# Patient Record
Sex: FEMALE | Race: WHITE | Employment: OTHER | URBAN - METROPOLITAN AREA
[De-identification: names, ages, dates, MRNs, and addresses within clinical notes are randomized per-mention and may not be internally consistent; named-entity substitution may affect disease eponyms.]

---

## 2017-02-14 ENCOUNTER — OFFICE VISIT (OUTPATIENT)
Dept: FAMILY MEDICINE CLINIC | Age: 77
End: 2017-02-14

## 2017-02-14 VITALS
OXYGEN SATURATION: 97 % | BODY MASS INDEX: 38.17 KG/M2 | SYSTOLIC BLOOD PRESSURE: 120 MMHG | TEMPERATURE: 99.4 F | WEIGHT: 202 LBS | DIASTOLIC BLOOD PRESSURE: 80 MMHG | HEART RATE: 100 BPM

## 2017-02-14 DIAGNOSIS — J40 BRONCHITIS: ICD-10-CM

## 2017-02-14 DIAGNOSIS — J01.00 ACUTE NON-RECURRENT MAXILLARY SINUSITIS: Primary | ICD-10-CM

## 2017-02-14 PROCEDURE — 99213 OFFICE O/P EST LOW 20 MIN: CPT | Performed by: PHYSICIAN ASSISTANT

## 2017-02-14 PROCEDURE — 3014F SCREEN MAMMO DOC REV: CPT | Performed by: PHYSICIAN ASSISTANT

## 2017-02-14 PROCEDURE — 3017F COLORECTAL CA SCREEN DOC REV: CPT | Performed by: PHYSICIAN ASSISTANT

## 2017-02-14 PROCEDURE — G8399 PT W/DXA RESULTS DOCUMENT: HCPCS | Performed by: PHYSICIAN ASSISTANT

## 2017-02-14 PROCEDURE — 1036F TOBACCO NON-USER: CPT | Performed by: PHYSICIAN ASSISTANT

## 2017-02-14 PROCEDURE — 1123F ACP DISCUSS/DSCN MKR DOCD: CPT | Performed by: PHYSICIAN ASSISTANT

## 2017-02-14 PROCEDURE — 1090F PRES/ABSN URINE INCON ASSESS: CPT | Performed by: PHYSICIAN ASSISTANT

## 2017-02-14 PROCEDURE — G8484 FLU IMMUNIZE NO ADMIN: HCPCS | Performed by: PHYSICIAN ASSISTANT

## 2017-02-14 PROCEDURE — 4040F PNEUMOC VAC/ADMIN/RCVD: CPT | Performed by: PHYSICIAN ASSISTANT

## 2017-02-14 PROCEDURE — G8427 DOCREV CUR MEDS BY ELIG CLIN: HCPCS | Performed by: PHYSICIAN ASSISTANT

## 2017-02-14 PROCEDURE — G8419 CALC BMI OUT NRM PARAM NOF/U: HCPCS | Performed by: PHYSICIAN ASSISTANT

## 2017-02-14 RX ORDER — PREDNISONE 10 MG/1
TABLET ORAL
Qty: 30 TABLET | Refills: 0 | Status: SHIPPED | OUTPATIENT
Start: 2017-02-14 | End: 2017-03-22 | Stop reason: ALTCHOICE

## 2017-02-14 RX ORDER — GUAIFENESIN AND CODEINE PHOSPHATE 100; 10 MG/5ML; MG/5ML
5 SOLUTION ORAL EVERY 4 HOURS PRN
Qty: 120 ML | Refills: 0 | Status: SHIPPED | OUTPATIENT
Start: 2017-02-14 | End: 2017-02-21

## 2017-02-14 RX ORDER — AMOXICILLIN AND CLAVULANATE POTASSIUM 875; 125 MG/1; MG/1
1 TABLET, FILM COATED ORAL 2 TIMES DAILY
Qty: 20 TABLET | Refills: 0 | Status: SHIPPED | OUTPATIENT
Start: 2017-02-14 | End: 2017-02-24

## 2017-02-14 ASSESSMENT — ENCOUNTER SYMPTOMS
SORE THROAT: 1
NAUSEA: 0
VOMITING: 0
SHORTNESS OF BREATH: 0
RHINORRHEA: 0
SINUS PRESSURE: 1
WHEEZING: 0
COUGH: 1

## 2017-03-01 ENCOUNTER — CARE COORDINATION (OUTPATIENT)
Dept: CARE COORDINATION | Age: 77
End: 2017-03-01

## 2017-03-22 ENCOUNTER — OFFICE VISIT (OUTPATIENT)
Dept: FAMILY MEDICINE CLINIC | Age: 77
End: 2017-03-22

## 2017-03-22 VITALS
SYSTOLIC BLOOD PRESSURE: 108 MMHG | BODY MASS INDEX: 37.98 KG/M2 | OXYGEN SATURATION: 93 % | WEIGHT: 201 LBS | DIASTOLIC BLOOD PRESSURE: 70 MMHG | HEART RATE: 99 BPM

## 2017-03-22 DIAGNOSIS — M16.0 PRIMARY OSTEOARTHRITIS OF BOTH HIPS: ICD-10-CM

## 2017-03-22 DIAGNOSIS — I10 ESSENTIAL HYPERTENSION: Primary | ICD-10-CM

## 2017-03-22 DIAGNOSIS — E03.9 ACQUIRED HYPOTHYROIDISM: ICD-10-CM

## 2017-03-22 DIAGNOSIS — E78.00 PURE HYPERCHOLESTEROLEMIA: ICD-10-CM

## 2017-03-22 DIAGNOSIS — J44.9 CHRONIC AIRWAY OBSTRUCTION, NOT ELSEWHERE CLASSIFIED: ICD-10-CM

## 2017-03-22 PROCEDURE — 99214 OFFICE O/P EST MOD 30 MIN: CPT | Performed by: FAMILY MEDICINE

## 2017-03-22 PROCEDURE — 3017F COLORECTAL CA SCREEN DOC REV: CPT | Performed by: FAMILY MEDICINE

## 2017-03-22 PROCEDURE — 4040F PNEUMOC VAC/ADMIN/RCVD: CPT | Performed by: FAMILY MEDICINE

## 2017-03-22 PROCEDURE — G8417 CALC BMI ABV UP PARAM F/U: HCPCS | Performed by: FAMILY MEDICINE

## 2017-03-22 PROCEDURE — G8399 PT W/DXA RESULTS DOCUMENT: HCPCS | Performed by: FAMILY MEDICINE

## 2017-03-22 PROCEDURE — 3014F SCREEN MAMMO DOC REV: CPT | Performed by: FAMILY MEDICINE

## 2017-03-22 PROCEDURE — 1123F ACP DISCUSS/DSCN MKR DOCD: CPT | Performed by: FAMILY MEDICINE

## 2017-03-22 PROCEDURE — 3023F SPIROM DOC REV: CPT | Performed by: FAMILY MEDICINE

## 2017-03-22 PROCEDURE — 1090F PRES/ABSN URINE INCON ASSESS: CPT | Performed by: FAMILY MEDICINE

## 2017-03-22 PROCEDURE — G8484 FLU IMMUNIZE NO ADMIN: HCPCS | Performed by: FAMILY MEDICINE

## 2017-03-22 PROCEDURE — 1036F TOBACCO NON-USER: CPT | Performed by: FAMILY MEDICINE

## 2017-03-22 PROCEDURE — G8427 DOCREV CUR MEDS BY ELIG CLIN: HCPCS | Performed by: FAMILY MEDICINE

## 2017-03-22 PROCEDURE — G8926 SPIRO NO PERF OR DOC: HCPCS | Performed by: FAMILY MEDICINE

## 2017-03-22 RX ORDER — LOSARTAN POTASSIUM AND HYDROCHLOROTHIAZIDE 12.5; 5 MG/1; MG/1
1 TABLET ORAL DAILY
Qty: 90 TABLET | Refills: 3 | Status: SHIPPED | OUTPATIENT
Start: 2017-03-22 | End: 2018-04-30 | Stop reason: SDUPTHER

## 2017-03-22 ASSESSMENT — ENCOUNTER SYMPTOMS
ABDOMINAL PAIN: 0
BACK PAIN: 1
CONSTIPATION: 0
SHORTNESS OF BREATH: 0
DIARRHEA: 0
WHEEZING: 0
COUGH: 0

## 2017-05-02 ENCOUNTER — CARE COORDINATION (OUTPATIENT)
Dept: CARE COORDINATION | Age: 77
End: 2017-05-02

## 2017-05-24 ENCOUNTER — CARE COORDINATION (OUTPATIENT)
Dept: CARE COORDINATION | Age: 77
End: 2017-05-24

## 2017-06-23 ENCOUNTER — OFFICE VISIT (OUTPATIENT)
Dept: FAMILY MEDICINE CLINIC | Age: 77
End: 2017-06-23

## 2017-06-23 VITALS
HEART RATE: 96 BPM | BODY MASS INDEX: 37.98 KG/M2 | DIASTOLIC BLOOD PRESSURE: 80 MMHG | OXYGEN SATURATION: 92 % | SYSTOLIC BLOOD PRESSURE: 134 MMHG | WEIGHT: 201 LBS

## 2017-06-23 DIAGNOSIS — E78.00 PURE HYPERCHOLESTEROLEMIA: ICD-10-CM

## 2017-06-23 DIAGNOSIS — M15.9 GENERALIZED OSTEOARTHROSIS, INVOLVING MULTIPLE SITES: ICD-10-CM

## 2017-06-23 DIAGNOSIS — D50.0 IRON DEFICIENCY ANEMIA DUE TO CHRONIC BLOOD LOSS: ICD-10-CM

## 2017-06-23 DIAGNOSIS — M17.0 PRIMARY OSTEOARTHRITIS OF BOTH KNEES: Primary | ICD-10-CM

## 2017-06-23 DIAGNOSIS — I10 ESSENTIAL HYPERTENSION: ICD-10-CM

## 2017-06-23 DIAGNOSIS — R73.09 OTHER ABNORMAL GLUCOSE: ICD-10-CM

## 2017-06-23 PROCEDURE — 3017F COLORECTAL CA SCREEN DOC REV: CPT | Performed by: FAMILY MEDICINE

## 2017-06-23 PROCEDURE — 1123F ACP DISCUSS/DSCN MKR DOCD: CPT | Performed by: FAMILY MEDICINE

## 2017-06-23 PROCEDURE — 99214 OFFICE O/P EST MOD 30 MIN: CPT | Performed by: FAMILY MEDICINE

## 2017-06-23 PROCEDURE — 1036F TOBACCO NON-USER: CPT | Performed by: FAMILY MEDICINE

## 2017-06-23 PROCEDURE — G8399 PT W/DXA RESULTS DOCUMENT: HCPCS | Performed by: FAMILY MEDICINE

## 2017-06-23 PROCEDURE — G8417 CALC BMI ABV UP PARAM F/U: HCPCS | Performed by: FAMILY MEDICINE

## 2017-06-23 PROCEDURE — G8427 DOCREV CUR MEDS BY ELIG CLIN: HCPCS | Performed by: FAMILY MEDICINE

## 2017-06-23 PROCEDURE — 4040F PNEUMOC VAC/ADMIN/RCVD: CPT | Performed by: FAMILY MEDICINE

## 2017-06-23 PROCEDURE — 1090F PRES/ABSN URINE INCON ASSESS: CPT | Performed by: FAMILY MEDICINE

## 2017-06-23 RX ORDER — ATORVASTATIN CALCIUM 20 MG/1
20 TABLET, FILM COATED ORAL DAILY
Qty: 30 TABLET | Refills: 5 | Status: SHIPPED | OUTPATIENT
Start: 2017-06-23 | End: 2018-10-05 | Stop reason: SDUPTHER

## 2017-06-23 ASSESSMENT — ENCOUNTER SYMPTOMS
WHEEZING: 0
CONSTIPATION: 0
COUGH: 0
ABDOMINAL PAIN: 0
SHORTNESS OF BREATH: 1
BACK PAIN: 1
DIARRHEA: 0

## 2017-09-25 ENCOUNTER — OFFICE VISIT (OUTPATIENT)
Dept: FAMILY MEDICINE CLINIC | Age: 77
End: 2017-09-25

## 2017-09-25 VITALS
OXYGEN SATURATION: 92 % | DIASTOLIC BLOOD PRESSURE: 82 MMHG | HEART RATE: 94 BPM | SYSTOLIC BLOOD PRESSURE: 118 MMHG | WEIGHT: 203 LBS | HEIGHT: 62 IN | BODY MASS INDEX: 37.36 KG/M2

## 2017-09-25 DIAGNOSIS — E78.00 PURE HYPERCHOLESTEROLEMIA: ICD-10-CM

## 2017-09-25 DIAGNOSIS — Z12.11 COLON CANCER SCREENING: ICD-10-CM

## 2017-09-25 DIAGNOSIS — M16.0 PRIMARY OSTEOARTHRITIS OF BOTH HIPS: ICD-10-CM

## 2017-09-25 DIAGNOSIS — I10 ESSENTIAL HYPERTENSION: Primary | ICD-10-CM

## 2017-09-25 DIAGNOSIS — R73.09 OTHER ABNORMAL GLUCOSE: ICD-10-CM

## 2017-09-25 DIAGNOSIS — E03.9 ACQUIRED HYPOTHYROIDISM: ICD-10-CM

## 2017-09-25 PROCEDURE — G8417 CALC BMI ABV UP PARAM F/U: HCPCS | Performed by: FAMILY MEDICINE

## 2017-09-25 PROCEDURE — 4040F PNEUMOC VAC/ADMIN/RCVD: CPT | Performed by: FAMILY MEDICINE

## 2017-09-25 PROCEDURE — G8510 SCR DEP NEG, NO PLAN REQD: HCPCS | Performed by: FAMILY MEDICINE

## 2017-09-25 PROCEDURE — 3017F COLORECTAL CA SCREEN DOC REV: CPT | Performed by: FAMILY MEDICINE

## 2017-09-25 PROCEDURE — G8399 PT W/DXA RESULTS DOCUMENT: HCPCS | Performed by: FAMILY MEDICINE

## 2017-09-25 PROCEDURE — G0008 ADMIN INFLUENZA VIRUS VAC: HCPCS | Performed by: FAMILY MEDICINE

## 2017-09-25 PROCEDURE — 1036F TOBACCO NON-USER: CPT | Performed by: FAMILY MEDICINE

## 2017-09-25 PROCEDURE — 90662 IIV NO PRSV INCREASED AG IM: CPT | Performed by: FAMILY MEDICINE

## 2017-09-25 PROCEDURE — 1123F ACP DISCUSS/DSCN MKR DOCD: CPT | Performed by: FAMILY MEDICINE

## 2017-09-25 PROCEDURE — 99214 OFFICE O/P EST MOD 30 MIN: CPT | Performed by: FAMILY MEDICINE

## 2017-09-25 PROCEDURE — 1090F PRES/ABSN URINE INCON ASSESS: CPT | Performed by: FAMILY MEDICINE

## 2017-09-25 PROCEDURE — G8427 DOCREV CUR MEDS BY ELIG CLIN: HCPCS | Performed by: FAMILY MEDICINE

## 2017-09-25 RX ORDER — ALBUTEROL SULFATE 90 UG/1
2 AEROSOL, METERED RESPIRATORY (INHALATION) EVERY 6 HOURS PRN
Qty: 1 INHALER | Refills: 6 | Status: SHIPPED | OUTPATIENT
Start: 2017-09-25 | End: 2018-03-26 | Stop reason: SDUPTHER

## 2017-09-25 ASSESSMENT — PATIENT HEALTH QUESTIONNAIRE - PHQ9
SUM OF ALL RESPONSES TO PHQ9 QUESTIONS 1 & 2: 0
SUM OF ALL RESPONSES TO PHQ QUESTIONS 1-9: 0
2. FEELING DOWN, DEPRESSED OR HOPELESS: 0
1. LITTLE INTEREST OR PLEASURE IN DOING THINGS: 0

## 2017-10-17 ENCOUNTER — OFFICE VISIT (OUTPATIENT)
Dept: FAMILY MEDICINE CLINIC | Age: 77
End: 2017-10-17

## 2017-10-17 VITALS
DIASTOLIC BLOOD PRESSURE: 80 MMHG | BODY MASS INDEX: 37.61 KG/M2 | TEMPERATURE: 99.3 F | SYSTOLIC BLOOD PRESSURE: 120 MMHG | HEART RATE: 89 BPM | WEIGHT: 204 LBS | OXYGEN SATURATION: 97 %

## 2017-10-17 DIAGNOSIS — J18.9 PNEUMONIA OF BOTH LOWER LOBES DUE TO INFECTIOUS ORGANISM: Primary | ICD-10-CM

## 2017-10-17 DIAGNOSIS — J41.8 MIXED SIMPLE AND MUCOPURULENT CHRONIC BRONCHITIS (HCC): ICD-10-CM

## 2017-10-17 DIAGNOSIS — M25.512 LEFT SHOULDER PAIN, UNSPECIFIED CHRONICITY: ICD-10-CM

## 2017-10-17 PROCEDURE — 94640 AIRWAY INHALATION TREATMENT: CPT | Performed by: FAMILY MEDICINE

## 2017-10-17 PROCEDURE — 93000 ELECTROCARDIOGRAM COMPLETE: CPT | Performed by: FAMILY MEDICINE

## 2017-10-17 PROCEDURE — 1036F TOBACCO NON-USER: CPT | Performed by: FAMILY MEDICINE

## 2017-10-17 PROCEDURE — G8399 PT W/DXA RESULTS DOCUMENT: HCPCS | Performed by: FAMILY MEDICINE

## 2017-10-17 PROCEDURE — 1123F ACP DISCUSS/DSCN MKR DOCD: CPT | Performed by: FAMILY MEDICINE

## 2017-10-17 PROCEDURE — 99214 OFFICE O/P EST MOD 30 MIN: CPT | Performed by: FAMILY MEDICINE

## 2017-10-17 PROCEDURE — G8484 FLU IMMUNIZE NO ADMIN: HCPCS | Performed by: FAMILY MEDICINE

## 2017-10-17 PROCEDURE — G8417 CALC BMI ABV UP PARAM F/U: HCPCS | Performed by: FAMILY MEDICINE

## 2017-10-17 PROCEDURE — 3023F SPIROM DOC REV: CPT | Performed by: FAMILY MEDICINE

## 2017-10-17 PROCEDURE — 4040F PNEUMOC VAC/ADMIN/RCVD: CPT | Performed by: FAMILY MEDICINE

## 2017-10-17 PROCEDURE — G8926 SPIRO NO PERF OR DOC: HCPCS | Performed by: FAMILY MEDICINE

## 2017-10-17 PROCEDURE — G8427 DOCREV CUR MEDS BY ELIG CLIN: HCPCS | Performed by: FAMILY MEDICINE

## 2017-10-17 PROCEDURE — 1090F PRES/ABSN URINE INCON ASSESS: CPT | Performed by: FAMILY MEDICINE

## 2017-10-17 PROCEDURE — 3017F COLORECTAL CA SCREEN DOC REV: CPT | Performed by: FAMILY MEDICINE

## 2017-10-17 RX ORDER — IPRATROPIUM BROMIDE AND ALBUTEROL SULFATE 2.5; .5 MG/3ML; MG/3ML
1 SOLUTION RESPIRATORY (INHALATION) ONCE
Status: COMPLETED | OUTPATIENT
Start: 2017-10-17 | End: 2017-10-17

## 2017-10-17 RX ORDER — LEVOFLOXACIN 750 MG/1
750 TABLET ORAL DAILY
Qty: 7 TABLET | Refills: 0 | Status: SHIPPED | OUTPATIENT
Start: 2017-10-17 | End: 2017-10-24

## 2017-10-17 RX ADMIN — IPRATROPIUM BROMIDE AND ALBUTEROL SULFATE 1 AMPULE: 2.5; .5 SOLUTION RESPIRATORY (INHALATION) at 13:18

## 2017-10-17 ASSESSMENT — ENCOUNTER SYMPTOMS
SORE THROAT: 1
TROUBLE SWALLOWING: 1
COUGH: 1
WHEEZING: 1
CHEST TIGHTNESS: 0
SHORTNESS OF BREATH: 1

## 2017-10-17 NOTE — PROGRESS NOTES
Jagjit Muhammad  : 1940  Encounter date: 10/17/2017    This is a 78 y.o. female who presents with  Chief Complaint   Patient presents with    URI     sore throat, body aches, nasal congestion, mostly dry when coughing. No known fever. Started a few days ago with fatigue, but felt really bad this morning. History of present illness:    Throat hurting, ears ringing. Aching. Doesn't think fever. Hurts on sides and feels \"big\". No significant sinus pain/pressure. Is coughing, phlegm comes up. More tight feeling. Always has productive cough due to COPD. Using inhalers as directed. Albuterol about twice daily which is her norm; just hasn't thought of doing it more. Has felt very tired for a few days but this morning felt really terrible and couldn't swallow. Has had some sharp pains coming and going in left shoulder - feels like when she had pneumonia in the past. Not staying. Occurs randomly. Allergies   Allergen Reactions    Ace Inhibitors      cough    Aspirin      Recurrent GI blood Loss?     Sulfa Antibiotics      hallucinations     Outpatient Prescriptions Marked as Taking for the 10/17/17 encounter (Office Visit) with Manda Crow MD   Medication Sig Dispense Refill    levofloxacin (LEVAQUIN) 750 MG tablet Take 1 tablet by mouth daily for 7 days 7 tablet 0    fluticasone-salmeterol (ADVAIR) 250-50 MCG/DOSE AEPB Inhale 1 puff into the lungs every 12 hours 1 each 6    albuterol sulfate HFA (PROAIR HFA) 108 (90 Base) MCG/ACT inhaler Inhale 2 puffs into the lungs every 6 hours as needed for Wheezing or Shortness of Breath 1 Inhaler 6    tiotropium (SPIRIVA HANDIHALER) 18 MCG inhalation capsule Inhale 1 capsule into the lungs daily 30 capsule 6    atorvastatin (LIPITOR) 20 MG tablet Take 1 tablet by mouth daily 30 tablet 5    losartan-hydrochlorothiazide (HYZAAR) 50-12.5 MG per tablet Take 1 tablet by mouth daily 90 tablet 3    ibuprofen (ADVIL;MOTRIN) 800 MG tablet Take 800 mg by mouth every 8 hours as needed for Pain         Review of Systems   Constitutional: Positive for diaphoresis (She has had a couple episodes of severe sweating.) and fatigue. Negative for chills and fever. HENT: Positive for sore throat, tinnitus and trouble swallowing (Due to painful throat). Negative for ear discharge, ear pain and hearing loss. Respiratory: Positive for cough, shortness of breath (States it's difficultto tell if this is significantly worse because her breathing difficulty comes and goes.) and wheezing. Negative for chest tightness. Cardiovascular: Negative for chest pain, palpitations and leg swelling. Objective:    /80   Pulse 89   Temp 99.3 °F (37.4 °C)   Wt 204 lb (92.5 kg)   SpO2 97%   BMI 37.61 kg/m²   Weight: 204 lb (92.5 kg)     BP Readings from Last 3 Encounters:   10/17/17 120/80   09/25/17 118/82   06/23/17 134/80     Wt Readings from Last 3 Encounters:   10/17/17 204 lb (92.5 kg)   09/25/17 203 lb (92.1 kg)   06/23/17 201 lb (91.2 kg)       Physical Exam   Constitutional: She appears well-developed. No distress. HENT:   Right Ear: No drainage. Tympanic membrane is not injected and not erythematous. Left Ear: No drainage. Tympanic membrane is perforated. Tympanic membrane is not injected and not erythematous. Nose: Mucosal edema present. Mouth/Throat: Uvula is midline and mucous membranes are normal. Posterior oropharyngeal edema and posterior oropharyngeal erythema (significant cobblestoning) present. There is a small perforation left TM   Neck: Normal range of motion. Neck supple. Cardiovascular: Normal rate and regular rhythm. Murmur (2/6 systolic) heard. Pulmonary/Chest: No respiratory distress. She has no wheezes. She has rales (Bilateral basal, increased on left). Lymphadenopathy:     She has no cervical adenopathy. Assessment/Plan    1. Pneumonia of both lower lobes due to infectious organism  Will cover with Levaquin.  Let us know if any worsening or not improved with treatment. Did advise her to increase albuterol to 3 times a day over the next couple of days and add Mucinex. Keep up with fluids. 2. Left shoulder pain, unspecified chronicity  EKG is stable from baseline. I suspect this is referred pain from pneumonia as she has had this exact discomfort in the past. I did advise her to note symptoms in case of any worsening.  - EKG 12 Lead      Return if symptoms worsen or fail to improve.

## 2017-11-03 ENCOUNTER — TELEPHONE (OUTPATIENT)
Dept: OTHER | Facility: CLINIC | Age: 77
End: 2017-11-03

## 2017-11-29 ENCOUNTER — NURSE ONLY (OUTPATIENT)
Dept: FAMILY MEDICINE CLINIC | Age: 77
End: 2017-11-29

## 2017-11-29 DIAGNOSIS — Z12.11 COLON CANCER SCREENING: ICD-10-CM

## 2017-11-29 LAB
CONTROL: NORMAL
HEMOCCULT STL QL: NEGATIVE

## 2017-11-29 PROCEDURE — 82274 ASSAY TEST FOR BLOOD FECAL: CPT | Performed by: FAMILY MEDICINE

## 2017-12-22 ENCOUNTER — OFFICE VISIT (OUTPATIENT)
Dept: FAMILY MEDICINE CLINIC | Age: 77
End: 2017-12-22

## 2017-12-22 VITALS
SYSTOLIC BLOOD PRESSURE: 135 MMHG | WEIGHT: 203 LBS | OXYGEN SATURATION: 95 % | BODY MASS INDEX: 37.43 KG/M2 | HEART RATE: 81 BPM | DIASTOLIC BLOOD PRESSURE: 70 MMHG

## 2017-12-22 DIAGNOSIS — I10 ESSENTIAL HYPERTENSION: ICD-10-CM

## 2017-12-22 DIAGNOSIS — M17.0 PRIMARY OSTEOARTHRITIS OF BOTH KNEES: ICD-10-CM

## 2017-12-22 DIAGNOSIS — D50.0 IRON DEFICIENCY ANEMIA DUE TO CHRONIC BLOOD LOSS: ICD-10-CM

## 2017-12-22 DIAGNOSIS — M16.0 PRIMARY OSTEOARTHRITIS OF BOTH HIPS: ICD-10-CM

## 2017-12-22 DIAGNOSIS — E03.9 ACQUIRED HYPOTHYROIDISM: Primary | ICD-10-CM

## 2017-12-22 DIAGNOSIS — E78.00 PURE HYPERCHOLESTEROLEMIA: ICD-10-CM

## 2017-12-22 DIAGNOSIS — G60.9 IDIOPATHIC PERIPHERAL NEUROPATHY: ICD-10-CM

## 2017-12-22 PROCEDURE — 3017F COLORECTAL CA SCREEN DOC REV: CPT | Performed by: FAMILY MEDICINE

## 2017-12-22 PROCEDURE — G8417 CALC BMI ABV UP PARAM F/U: HCPCS | Performed by: FAMILY MEDICINE

## 2017-12-22 PROCEDURE — G8427 DOCREV CUR MEDS BY ELIG CLIN: HCPCS | Performed by: FAMILY MEDICINE

## 2017-12-22 PROCEDURE — 99214 OFFICE O/P EST MOD 30 MIN: CPT | Performed by: FAMILY MEDICINE

## 2017-12-22 PROCEDURE — 4040F PNEUMOC VAC/ADMIN/RCVD: CPT | Performed by: FAMILY MEDICINE

## 2017-12-22 PROCEDURE — 1036F TOBACCO NON-USER: CPT | Performed by: FAMILY MEDICINE

## 2017-12-22 PROCEDURE — 1090F PRES/ABSN URINE INCON ASSESS: CPT | Performed by: FAMILY MEDICINE

## 2017-12-22 PROCEDURE — 1123F ACP DISCUSS/DSCN MKR DOCD: CPT | Performed by: FAMILY MEDICINE

## 2017-12-22 PROCEDURE — G8399 PT W/DXA RESULTS DOCUMENT: HCPCS | Performed by: FAMILY MEDICINE

## 2017-12-22 PROCEDURE — G8484 FLU IMMUNIZE NO ADMIN: HCPCS | Performed by: FAMILY MEDICINE

## 2017-12-22 RX ORDER — LEVOTHYROXINE SODIUM 0.1 MG/1
TABLET ORAL
Qty: 90 TABLET | Refills: 3 | Status: SHIPPED | OUTPATIENT
Start: 2017-12-22 | End: 2019-01-30 | Stop reason: SDUPTHER

## 2017-12-22 RX ORDER — GABAPENTIN 100 MG/1
CAPSULE ORAL
Qty: 90 CAPSULE | Refills: 3 | Status: SHIPPED | OUTPATIENT
Start: 2017-12-22 | End: 2018-02-08 | Stop reason: ALTCHOICE

## 2017-12-22 RX ORDER — GABAPENTIN 100 MG/1
CAPSULE ORAL
Qty: 90 CAPSULE | Refills: 3 | Status: SHIPPED | OUTPATIENT
Start: 2017-12-22 | End: 2017-12-22 | Stop reason: CLARIF

## 2017-12-22 ASSESSMENT — ENCOUNTER SYMPTOMS
DIARRHEA: 0
COUGH: 0
WHEEZING: 0
ABDOMINAL PAIN: 0
SHORTNESS OF BREATH: 0
CONSTIPATION: 0
BACK PAIN: 1

## 2017-12-22 NOTE — PROGRESS NOTES
strength. Right knee: She exhibits decreased range of motion. Left knee: She exhibits decreased range of motion. Neurological: She is alert and oriented to person, place, and time. Decrease vibratory sensation feet right > left   Psychiatric: She has a normal mood and affect. ASSESSMENT:    1. Acquired hypothyroidism  TSH WITH REFLEX TO FT4   2. Essential hypertension     3. Pure hypercholesterolemia  LIPID PANEL    Comprehensive Metabolic Panel   4. Primary osteoarthritis of both knees     5. Primary osteoarthritis of both hips     6. Idiopathic peripheral neuropathy  RPR    LEAD, BLOOD    PROTEIN ELECTROPHORESIS, SERUM    IMMUNOFIXATION SERUM PROFILE    KAPPA/LAMBDA QUANT FREE LIGHT CHAINS SERUM   7. Iron deficiency anemia due to chronic blood loss  CBC           PLAN:    See orders.  W/u peripheral neuropathy   seems to be idiopathic   other condtions controlled, meds the same   rx with gabapentin: Benefits and side effects of medication discussed with patient  Lab ordered, rtc 1 mpnth

## 2018-02-08 PROBLEM — J10.1 INFLUENZA A: Status: ACTIVE | Noted: 2018-02-08

## 2018-02-08 PROBLEM — J20.9 ACUTE BRONCHITIS: Status: ACTIVE | Noted: 2018-02-08

## 2018-02-08 PROBLEM — J44.1 COPD EXACERBATION (HCC): Status: ACTIVE | Noted: 2018-02-08

## 2018-02-21 ENCOUNTER — OFFICE VISIT (OUTPATIENT)
Dept: FAMILY MEDICINE CLINIC | Age: 78
End: 2018-02-21

## 2018-02-21 VITALS
HEART RATE: 91 BPM | TEMPERATURE: 99.3 F | SYSTOLIC BLOOD PRESSURE: 112 MMHG | OXYGEN SATURATION: 96 % | WEIGHT: 199 LBS | BODY MASS INDEX: 37.6 KG/M2 | DIASTOLIC BLOOD PRESSURE: 70 MMHG

## 2018-02-21 DIAGNOSIS — E03.9 ACQUIRED HYPOTHYROIDISM: ICD-10-CM

## 2018-02-21 DIAGNOSIS — I10 ESSENTIAL HYPERTENSION: ICD-10-CM

## 2018-02-21 DIAGNOSIS — J41.8 MIXED SIMPLE AND MUCOPURULENT CHRONIC BRONCHITIS (HCC): Primary | ICD-10-CM

## 2018-02-21 DIAGNOSIS — M15.9 GENERALIZED OSTEOARTHROSIS, INVOLVING MULTIPLE SITES: ICD-10-CM

## 2018-02-21 PROCEDURE — 1123F ACP DISCUSS/DSCN MKR DOCD: CPT | Performed by: FAMILY MEDICINE

## 2018-02-21 PROCEDURE — G8399 PT W/DXA RESULTS DOCUMENT: HCPCS | Performed by: FAMILY MEDICINE

## 2018-02-21 PROCEDURE — G8427 DOCREV CUR MEDS BY ELIG CLIN: HCPCS | Performed by: FAMILY MEDICINE

## 2018-02-21 PROCEDURE — 3023F SPIROM DOC REV: CPT | Performed by: FAMILY MEDICINE

## 2018-02-21 PROCEDURE — G8926 SPIRO NO PERF OR DOC: HCPCS | Performed by: FAMILY MEDICINE

## 2018-02-21 PROCEDURE — 1036F TOBACCO NON-USER: CPT | Performed by: FAMILY MEDICINE

## 2018-02-21 PROCEDURE — 4040F PNEUMOC VAC/ADMIN/RCVD: CPT | Performed by: FAMILY MEDICINE

## 2018-02-21 PROCEDURE — 99214 OFFICE O/P EST MOD 30 MIN: CPT | Performed by: FAMILY MEDICINE

## 2018-02-21 PROCEDURE — 3017F COLORECTAL CA SCREEN DOC REV: CPT | Performed by: FAMILY MEDICINE

## 2018-02-21 PROCEDURE — 1111F DSCHRG MED/CURRENT MED MERGE: CPT | Performed by: FAMILY MEDICINE

## 2018-02-21 PROCEDURE — G8484 FLU IMMUNIZE NO ADMIN: HCPCS | Performed by: FAMILY MEDICINE

## 2018-02-21 PROCEDURE — 1090F PRES/ABSN URINE INCON ASSESS: CPT | Performed by: FAMILY MEDICINE

## 2018-02-21 PROCEDURE — G8417 CALC BMI ABV UP PARAM F/U: HCPCS | Performed by: FAMILY MEDICINE

## 2018-02-21 RX ORDER — ACETAMINOPHEN 500 MG
1000 TABLET ORAL DAILY PRN
COMMUNITY

## 2018-02-21 NOTE — PROGRESS NOTES
Shortness of Breath 1 Inhaler 6    tiotropium (SPIRIVA HANDIHALER) 18 MCG inhalation capsule Inhale 1 capsule into the lungs daily 30 capsule 6    atorvastatin (LIPITOR) 20 MG tablet Take 1 tablet by mouth daily 30 tablet 5    losartan-hydrochlorothiazide (HYZAAR) 50-12.5 MG per tablet Take 1 tablet by mouth daily 90 tablet 3       Allergies   Allergen Reactions    Ace Inhibitors      cough    Aspirin      Recurrent GI blood Loss?  Sulfa Antibiotics      hallucinations       Social History   Substance Use Topics    Smoking status: Former Smoker    Smokeless tobacco: Never Used    Alcohol use No       /70 (Site: Left Arm, Position: Sitting, Cuff Size: Large Adult)   Pulse 91   Temp 99.3 °F (37.4 °C) (Tympanic)   Wt 199 lb (90.3 kg)   SpO2 96%   BMI 37.60 kg/m²      Current Outpatient Prescriptions on File Prior to Visit   Medication Sig Dispense Refill    levothyroxine (SYNTHROID) 100 MCG tablet TAKE 1 TABLET BY MOUTH EVERY DAY 90 tablet 3    fluticasone-salmeterol (ADVAIR) 250-50 MCG/DOSE AEPB Inhale 1 puff into the lungs every 12 hours 1 each 6    albuterol sulfate HFA (PROAIR HFA) 108 (90 Base) MCG/ACT inhaler Inhale 2 puffs into the lungs every 6 hours as needed for Wheezing or Shortness of Breath 1 Inhaler 6    tiotropium (SPIRIVA HANDIHALER) 18 MCG inhalation capsule Inhale 1 capsule into the lungs daily 30 capsule 6    atorvastatin (LIPITOR) 20 MG tablet Take 1 tablet by mouth daily 30 tablet 5    losartan-hydrochlorothiazide (HYZAAR) 50-12.5 MG per tablet Take 1 tablet by mouth daily 90 tablet 3     No current facility-administered medications on file prior to visit. Review of Systems    Objective:   Physical Exam   Constitutional: She is oriented to person, place, and time. She appears well-developed and well-nourished.    Obese   HENT:   Right Ear: External ear normal.   Left Ear: External ear normal.   Cardiovascular: Normal rate, regular rhythm, normal heart sounds and intact distal pulses. Pulmonary/Chest: Effort normal. No respiratory distress. Musculoskeletal: She exhibits no edema. Neurological: She is alert and oriented to person, place, and time. Psychiatric: She has a normal mood and affect. Assessment:      1. Mixed simple and mucopurulent chronic bronchitis ([address]     2. Generalized osteoarthrosis, involving multiple sites     3. Acquired hypothyroidism     4. Essential hypertension             Plan:      I reviewed her x-rays and lab from the hospital. Patient is apparently receiving home care at this point. We will continue with the same medications. I did recommend that she add Tylenol arthritis 2 in the morning and 2 at night to hear the help with her generalized arthritic pains. Continue to use all inhalers in her oral meds. She is to follow-up here in one month. She will need a basic metabolic panel rechecked in the future.

## 2018-03-06 ENCOUNTER — OFFICE VISIT (OUTPATIENT)
Dept: FAMILY MEDICINE CLINIC | Age: 78
End: 2018-03-06

## 2018-03-06 VITALS
OXYGEN SATURATION: 96 % | HEART RATE: 117 BPM | DIASTOLIC BLOOD PRESSURE: 70 MMHG | WEIGHT: 198.2 LBS | SYSTOLIC BLOOD PRESSURE: 110 MMHG | BODY MASS INDEX: 37.45 KG/M2 | TEMPERATURE: 98 F

## 2018-03-06 DIAGNOSIS — J06.9 UPPER RESPIRATORY TRACT INFECTION, UNSPECIFIED TYPE: Primary | ICD-10-CM

## 2018-03-06 PROCEDURE — 99213 OFFICE O/P EST LOW 20 MIN: CPT | Performed by: PHYSICIAN ASSISTANT

## 2018-03-06 PROCEDURE — G8417 CALC BMI ABV UP PARAM F/U: HCPCS | Performed by: PHYSICIAN ASSISTANT

## 2018-03-06 PROCEDURE — 1123F ACP DISCUSS/DSCN MKR DOCD: CPT | Performed by: PHYSICIAN ASSISTANT

## 2018-03-06 PROCEDURE — 3017F COLORECTAL CA SCREEN DOC REV: CPT | Performed by: PHYSICIAN ASSISTANT

## 2018-03-06 PROCEDURE — 1036F TOBACCO NON-USER: CPT | Performed by: PHYSICIAN ASSISTANT

## 2018-03-06 PROCEDURE — G8399 PT W/DXA RESULTS DOCUMENT: HCPCS | Performed by: PHYSICIAN ASSISTANT

## 2018-03-06 PROCEDURE — G8427 DOCREV CUR MEDS BY ELIG CLIN: HCPCS | Performed by: PHYSICIAN ASSISTANT

## 2018-03-06 PROCEDURE — 4040F PNEUMOC VAC/ADMIN/RCVD: CPT | Performed by: PHYSICIAN ASSISTANT

## 2018-03-06 PROCEDURE — G8484 FLU IMMUNIZE NO ADMIN: HCPCS | Performed by: PHYSICIAN ASSISTANT

## 2018-03-06 PROCEDURE — 1090F PRES/ABSN URINE INCON ASSESS: CPT | Performed by: PHYSICIAN ASSISTANT

## 2018-03-06 PROCEDURE — 1111F DSCHRG MED/CURRENT MED MERGE: CPT | Performed by: PHYSICIAN ASSISTANT

## 2018-03-06 RX ORDER — CEPHALEXIN 500 MG/1
500 CAPSULE ORAL 3 TIMES DAILY
Qty: 30 CAPSULE | Refills: 0 | Status: SHIPPED | OUTPATIENT
Start: 2018-03-06 | End: 2018-03-26

## 2018-03-06 RX ORDER — BENZONATATE 200 MG/1
200 CAPSULE ORAL 3 TIMES DAILY PRN
Qty: 30 CAPSULE | Refills: 0 | Status: SHIPPED | OUTPATIENT
Start: 2018-03-06 | End: 2018-03-16

## 2018-03-06 ASSESSMENT — ENCOUNTER SYMPTOMS
SHORTNESS OF BREATH: 1
SORE THROAT: 1
COUGH: 1
RHINORRHEA: 1
NAUSEA: 0
WHEEZING: 0
TROUBLE SWALLOWING: 0
VOMITING: 0
DIARRHEA: 0

## 2018-03-06 NOTE — PROGRESS NOTES
regular rhythm and normal heart sounds. Pulmonary/Chest: Effort normal and breath sounds normal. No respiratory distress. She has no decreased breath sounds. Lymphadenopathy:     She has no cervical adenopathy. Neurological: She is alert and oriented to person, place, and time. Assessment:      Mable Muniz was seen today for cough. Diagnoses and all orders for this visit:    Upper respiratory tract infection, unspecified type  -     cephALEXin (KEFLEX) 500 MG capsule; Take 1 capsule by mouth 3 times daily  -     benzonatate (TESSALON) 200 MG capsule; Take 1 capsule by mouth 3 times daily as needed for Cough             Plan:      Mucinex during the day, call next Monday with status. Call sooner if anything worsens or if you get a fever.

## 2018-03-06 NOTE — PATIENT INSTRUCTIONS
Candi Zeng was seen today for cough. Diagnoses and all orders for this visit:    Upper respiratory tract infection, unspecified type  -     cephALEXin (KEFLEX) 500 MG capsule; Take 1 capsule by mouth 3 times daily  -     benzonatate (TESSALON) 200 MG capsule; Take 1 capsule by mouth 3 times daily as needed for Cough       Take Mucinex for cough during the day. Call me Monday with status.

## 2018-03-26 ENCOUNTER — OFFICE VISIT (OUTPATIENT)
Dept: FAMILY MEDICINE CLINIC | Age: 78
End: 2018-03-26

## 2018-03-26 VITALS
BODY MASS INDEX: 37.6 KG/M2 | WEIGHT: 199 LBS | OXYGEN SATURATION: 96 % | SYSTOLIC BLOOD PRESSURE: 138 MMHG | HEART RATE: 87 BPM | DIASTOLIC BLOOD PRESSURE: 86 MMHG

## 2018-03-26 DIAGNOSIS — I10 ESSENTIAL HYPERTENSION: ICD-10-CM

## 2018-03-26 DIAGNOSIS — J41.8 MIXED SIMPLE AND MUCOPURULENT CHRONIC BRONCHITIS (HCC): Primary | ICD-10-CM

## 2018-03-26 PROCEDURE — G8417 CALC BMI ABV UP PARAM F/U: HCPCS | Performed by: FAMILY MEDICINE

## 2018-03-26 PROCEDURE — G8482 FLU IMMUNIZE ORDER/ADMIN: HCPCS | Performed by: FAMILY MEDICINE

## 2018-03-26 PROCEDURE — 1090F PRES/ABSN URINE INCON ASSESS: CPT | Performed by: FAMILY MEDICINE

## 2018-03-26 PROCEDURE — 4040F PNEUMOC VAC/ADMIN/RCVD: CPT | Performed by: FAMILY MEDICINE

## 2018-03-26 PROCEDURE — 99213 OFFICE O/P EST LOW 20 MIN: CPT | Performed by: FAMILY MEDICINE

## 2018-03-26 PROCEDURE — G8926 SPIRO NO PERF OR DOC: HCPCS | Performed by: FAMILY MEDICINE

## 2018-03-26 PROCEDURE — G8427 DOCREV CUR MEDS BY ELIG CLIN: HCPCS | Performed by: FAMILY MEDICINE

## 2018-03-26 PROCEDURE — 1036F TOBACCO NON-USER: CPT | Performed by: FAMILY MEDICINE

## 2018-03-26 PROCEDURE — G8399 PT W/DXA RESULTS DOCUMENT: HCPCS | Performed by: FAMILY MEDICINE

## 2018-03-26 PROCEDURE — 1123F ACP DISCUSS/DSCN MKR DOCD: CPT | Performed by: FAMILY MEDICINE

## 2018-03-26 PROCEDURE — 3017F COLORECTAL CA SCREEN DOC REV: CPT | Performed by: FAMILY MEDICINE

## 2018-03-26 PROCEDURE — 3023F SPIROM DOC REV: CPT | Performed by: FAMILY MEDICINE

## 2018-03-26 RX ORDER — ALBUTEROL SULFATE 90 UG/1
2 AEROSOL, METERED RESPIRATORY (INHALATION) EVERY 6 HOURS PRN
Qty: 1 INHALER | Refills: 6 | Status: SHIPPED | OUTPATIENT
Start: 2018-03-26 | End: 2019-04-02 | Stop reason: SDUPTHER

## 2018-03-26 NOTE — PROGRESS NOTES
(PROAIR HFA) 108 (90 Base) MCG/ACT inhaler Inhale 2 puffs into the lungs every 6 hours as needed for Wheezing or Shortness of Breath 1 Inhaler 6    tiotropium (SPIRIVA HANDIHALER) 18 MCG inhalation capsule Inhale 1 capsule into the lungs daily 30 capsule 6    atorvastatin (LIPITOR) 20 MG tablet Take 1 tablet by mouth daily 30 tablet 5    losartan-hydrochlorothiazide (HYZAAR) 50-12.5 MG per tablet Take 1 tablet by mouth daily 90 tablet 3     No current facility-administered medications on file prior to visit. ROS         Physical Exam   Constitutional: She is oriented to person, place, and time. She appears well-developed and well-nourished. Obese   HENT:   Right Ear: External ear normal.   Left Ear: External ear normal.   Cardiovascular: Normal rate, regular rhythm, normal heart sounds and intact distal pulses. Pulmonary/Chest: Effort normal. No respiratory distress. Musculoskeletal: She exhibits no edema. Neurological: She is alert and oriented to person, place, and time. Psychiatric: She has a normal mood and affect. Jo Ann Ruby was seen today for hypertension, hyperlipidemia, hypothyroidism and asthma. Diagnoses and all orders for this visit:    Mixed simple and mucopurulent chronic bronchitis ([address]    Essential hypertension    Other orders  -     fluticasone-salmeterol (ADVAIR) 250-50 MCG/DOSE AEPB; Inhale 1 puff into the lungs every 12 hours  -     albuterol sulfate HFA (PROAIR HFA) 108 (90 Base) MCG/ACT inhaler;  Inhale 2 puffs into the lungs every 6 hours as needed for Wheezing or Shortness of Breath    conditions improved, same medications, rtc 3 months; discussed change of living conditions, she is nervous about this

## 2018-04-30 RX ORDER — LOSARTAN POTASSIUM AND HYDROCHLOROTHIAZIDE 12.5; 5 MG/1; MG/1
1 TABLET ORAL DAILY
Qty: 90 TABLET | Refills: 0 | Status: SHIPPED | OUTPATIENT
Start: 2018-04-30 | End: 2018-07-31 | Stop reason: SDUPTHER

## 2018-06-19 ENCOUNTER — OFFICE VISIT (OUTPATIENT)
Dept: FAMILY MEDICINE CLINIC | Age: 78
End: 2018-06-19

## 2018-06-19 VITALS
BODY MASS INDEX: 37.98 KG/M2 | OXYGEN SATURATION: 96 % | DIASTOLIC BLOOD PRESSURE: 82 MMHG | SYSTOLIC BLOOD PRESSURE: 126 MMHG | TEMPERATURE: 99.4 F | WEIGHT: 201 LBS | HEART RATE: 98 BPM

## 2018-06-19 DIAGNOSIS — J18.9 PNEUMONIA OF LEFT LOWER LOBE DUE TO INFECTIOUS ORGANISM: ICD-10-CM

## 2018-06-19 DIAGNOSIS — J44.1 COPD EXACERBATION (HCC): Primary | ICD-10-CM

## 2018-06-19 PROCEDURE — 99214 OFFICE O/P EST MOD 30 MIN: CPT | Performed by: FAMILY MEDICINE

## 2018-06-19 PROCEDURE — 1036F TOBACCO NON-USER: CPT | Performed by: FAMILY MEDICINE

## 2018-06-19 PROCEDURE — G8926 SPIRO NO PERF OR DOC: HCPCS | Performed by: FAMILY MEDICINE

## 2018-06-19 PROCEDURE — 4040F PNEUMOC VAC/ADMIN/RCVD: CPT | Performed by: FAMILY MEDICINE

## 2018-06-19 PROCEDURE — G8417 CALC BMI ABV UP PARAM F/U: HCPCS | Performed by: FAMILY MEDICINE

## 2018-06-19 PROCEDURE — G8399 PT W/DXA RESULTS DOCUMENT: HCPCS | Performed by: FAMILY MEDICINE

## 2018-06-19 PROCEDURE — 1090F PRES/ABSN URINE INCON ASSESS: CPT | Performed by: FAMILY MEDICINE

## 2018-06-19 PROCEDURE — G8427 DOCREV CUR MEDS BY ELIG CLIN: HCPCS | Performed by: FAMILY MEDICINE

## 2018-06-19 PROCEDURE — 3023F SPIROM DOC REV: CPT | Performed by: FAMILY MEDICINE

## 2018-06-19 PROCEDURE — 1123F ACP DISCUSS/DSCN MKR DOCD: CPT | Performed by: FAMILY MEDICINE

## 2018-06-19 PROCEDURE — 94640 AIRWAY INHALATION TREATMENT: CPT | Performed by: FAMILY MEDICINE

## 2018-06-19 RX ORDER — PREDNISONE 20 MG/1
TABLET ORAL
COMMUNITY
Start: 2018-06-17 | End: 2018-06-27

## 2018-06-19 RX ORDER — IPRATROPIUM BROMIDE AND ALBUTEROL SULFATE 2.5; .5 MG/3ML; MG/3ML
1 SOLUTION RESPIRATORY (INHALATION) ONCE
Status: COMPLETED | OUTPATIENT
Start: 2018-06-19 | End: 2018-06-19

## 2018-06-19 RX ORDER — ALBUTEROL SULFATE 2.5 MG/3ML
2.5 SOLUTION RESPIRATORY (INHALATION) EVERY 6 HOURS PRN
Qty: 120 EACH | Refills: 1 | Status: SHIPPED | OUTPATIENT
Start: 2018-06-19 | End: 2019-09-26

## 2018-06-19 RX ORDER — FLUTICASONE PROPIONATE 50 MCG
2 SPRAY, SUSPENSION (ML) NASAL DAILY PRN
COMMUNITY
Start: 2018-06-17 | End: 2019-05-03 | Stop reason: SDUPTHER

## 2018-06-19 RX ORDER — LEVOFLOXACIN 750 MG/1
750 TABLET ORAL DAILY
Qty: 7 TABLET | Refills: 0 | Status: SHIPPED | OUTPATIENT
Start: 2018-06-19 | End: 2018-06-26

## 2018-06-19 RX ORDER — CETIRIZINE HYDROCHLORIDE 10 MG/1
10 TABLET ORAL DAILY
COMMUNITY

## 2018-06-19 RX ADMIN — IPRATROPIUM BROMIDE AND ALBUTEROL SULFATE 1 AMPULE: 2.5; .5 SOLUTION RESPIRATORY (INHALATION) at 13:11

## 2018-06-19 ASSESSMENT — ENCOUNTER SYMPTOMS
WHEEZING: 1
COUGH: 1
SORE THROAT: 1
SHORTNESS OF BREATH: 1

## 2018-06-20 ENCOUNTER — TELEPHONE (OUTPATIENT)
Dept: RHEUMATOLOGY | Age: 78
End: 2018-06-20

## 2018-06-21 ENCOUNTER — TELEPHONE (OUTPATIENT)
Dept: FAMILY MEDICINE CLINIC | Age: 78
End: 2018-06-21

## 2018-06-27 ENCOUNTER — OFFICE VISIT (OUTPATIENT)
Dept: FAMILY MEDICINE CLINIC | Age: 78
End: 2018-06-27

## 2018-06-27 VITALS
BODY MASS INDEX: 38.32 KG/M2 | OXYGEN SATURATION: 95 % | WEIGHT: 202.8 LBS | HEART RATE: 95 BPM | DIASTOLIC BLOOD PRESSURE: 64 MMHG | SYSTOLIC BLOOD PRESSURE: 110 MMHG

## 2018-06-27 DIAGNOSIS — E78.00 PURE HYPERCHOLESTEROLEMIA: ICD-10-CM

## 2018-06-27 DIAGNOSIS — G60.9 IDIOPATHIC PERIPHERAL NEUROPATHY: ICD-10-CM

## 2018-06-27 DIAGNOSIS — I10 ESSENTIAL HYPERTENSION: ICD-10-CM

## 2018-06-27 DIAGNOSIS — J41.8 MIXED SIMPLE AND MUCOPURULENT CHRONIC BRONCHITIS (HCC): Primary | ICD-10-CM

## 2018-06-27 DIAGNOSIS — M16.0 PRIMARY OSTEOARTHRITIS OF BOTH HIPS: ICD-10-CM

## 2018-06-27 DIAGNOSIS — E03.9 ACQUIRED HYPOTHYROIDISM: ICD-10-CM

## 2018-06-27 PROCEDURE — G8399 PT W/DXA RESULTS DOCUMENT: HCPCS | Performed by: FAMILY MEDICINE

## 2018-06-27 PROCEDURE — G8427 DOCREV CUR MEDS BY ELIG CLIN: HCPCS | Performed by: FAMILY MEDICINE

## 2018-06-27 PROCEDURE — 1123F ACP DISCUSS/DSCN MKR DOCD: CPT | Performed by: FAMILY MEDICINE

## 2018-06-27 PROCEDURE — G8926 SPIRO NO PERF OR DOC: HCPCS | Performed by: FAMILY MEDICINE

## 2018-06-27 PROCEDURE — 4040F PNEUMOC VAC/ADMIN/RCVD: CPT | Performed by: FAMILY MEDICINE

## 2018-06-27 PROCEDURE — 1090F PRES/ABSN URINE INCON ASSESS: CPT | Performed by: FAMILY MEDICINE

## 2018-06-27 PROCEDURE — 3023F SPIROM DOC REV: CPT | Performed by: FAMILY MEDICINE

## 2018-06-27 PROCEDURE — G8417 CALC BMI ABV UP PARAM F/U: HCPCS | Performed by: FAMILY MEDICINE

## 2018-06-27 PROCEDURE — 1036F TOBACCO NON-USER: CPT | Performed by: FAMILY MEDICINE

## 2018-06-27 PROCEDURE — 99214 OFFICE O/P EST MOD 30 MIN: CPT | Performed by: FAMILY MEDICINE

## 2018-06-27 ASSESSMENT — ENCOUNTER SYMPTOMS
BLURRED VISION: 0
COUGH: 1
CONSTIPATION: 0
SHORTNESS OF BREATH: 0
BACK PAIN: 1
SPUTUM PRODUCTION: 1
HEARTBURN: 0
ABDOMINAL PAIN: 0

## 2018-07-31 RX ORDER — LOSARTAN POTASSIUM AND HYDROCHLOROTHIAZIDE 12.5; 5 MG/1; MG/1
1 TABLET ORAL DAILY
Qty: 90 TABLET | Refills: 0 | Status: SHIPPED | OUTPATIENT
Start: 2018-07-31 | End: 2018-10-05 | Stop reason: SDUPTHER

## 2018-09-27 ENCOUNTER — HOSPITAL ENCOUNTER (OUTPATIENT)
Age: 78
Discharge: HOME | End: 2018-09-27
Payer: MEDICARE

## 2018-09-27 DIAGNOSIS — G60.9 IDIOPATHIC PERIPHERAL NEUROPATHY: ICD-10-CM

## 2018-09-27 DIAGNOSIS — E03.9 ACQUIRED HYPOTHYROIDISM: ICD-10-CM

## 2018-09-27 DIAGNOSIS — E78.00 PURE HYPERCHOLESTEROLEMIA: ICD-10-CM

## 2018-09-27 DIAGNOSIS — D50.0 IRON DEFICIENCY ANEMIA DUE TO CHRONIC BLOOD LOSS: ICD-10-CM

## 2018-09-27 LAB
A/G RATIO: 1.2 (ref 1.1–2.2)
ALBUMIN SERPL-MCNC: 4.2 G/DL (ref 3.4–5)
ALP BLD-CCNC: 87 U/L (ref 40–129)
ALT SERPL-CCNC: 8 U/L (ref 10–40)
ANION GAP SERPL CALCULATED.3IONS-SCNC: 12 MMOL/L (ref 3–16)
AST SERPL-CCNC: 22 U/L (ref 15–37)
BILIRUB SERPL-MCNC: 1.2 MG/DL (ref 0–1)
BUN BLDV-MCNC: 15 MG/DL (ref 7–20)
CALCIUM SERPL-MCNC: 9.5 MG/DL (ref 8.3–10.6)
CHLORIDE BLD-SCNC: 99 MMOL/L (ref 99–110)
CHOLESTEROL, TOTAL: 186 MG/DL (ref 0–199)
CO2: 29 MMOL/L (ref 21–32)
CREAT SERPL-MCNC: 0.6 MG/DL (ref 0.6–1.2)
GFR AFRICAN AMERICAN: >60
GFR NON-AFRICAN AMERICAN: >60
GLOBULIN: 3.6 G/DL
GLUCOSE BLD-MCNC: 102 MG/DL (ref 70–99)
HCT VFR BLD CALC: 46.2 % (ref 36–48)
HDLC SERPL-MCNC: 49 MG/DL (ref 40–60)
HEMOGLOBIN: 15 G/DL (ref 12–16)
LDL CHOLESTEROL CALCULATED: 122 MG/DL
MCH RBC QN AUTO: 28.2 PG (ref 26–34)
MCHC RBC AUTO-ENTMCNC: 32.5 G/DL (ref 31–36)
MCV RBC AUTO: 86.9 FL (ref 80–100)
PDW BLD-RTO: 14.4 % (ref 12.4–15.4)
PLATELET # BLD: 298 K/UL (ref 135–450)
PMV BLD AUTO: 9 FL (ref 5–10.5)
POTASSIUM SERPL-SCNC: 4.2 MMOL/L (ref 3.5–5.1)
RBC # BLD: 5.31 M/UL (ref 4–5.2)
SODIUM BLD-SCNC: 140 MMOL/L (ref 136–145)
TOTAL PROTEIN: 7.8 G/DL (ref 6.4–8.2)
TOTAL SYPHILLIS IGG/IGM: NORMAL
TRIGL SERPL-MCNC: 74 MG/DL (ref 0–150)
TSH REFLEX FT4: 2.34 UIU/ML (ref 0.27–4.2)
VLDLC SERPL CALC-MCNC: 15 MG/DL
WBC # BLD: 6.5 K/UL (ref 4–11)

## 2018-09-27 PROCEDURE — 83883 ASSAY NEPHELOMETRY NOT SPEC: CPT

## 2018-09-27 PROCEDURE — 84165 PROTEIN E-PHORESIS SERUM: CPT

## 2018-09-27 PROCEDURE — 84155 ASSAY OF PROTEIN SERUM: CPT

## 2018-09-27 PROCEDURE — 80053 COMPREHEN METABOLIC PANEL: CPT

## 2018-09-27 PROCEDURE — 85027 COMPLETE CBC AUTOMATED: CPT

## 2018-09-27 PROCEDURE — 83655 ASSAY OF LEAD: CPT

## 2018-09-27 PROCEDURE — 84443 ASSAY THYROID STIM HORMONE: CPT

## 2018-09-27 PROCEDURE — 80061 LIPID PANEL: CPT

## 2018-09-27 PROCEDURE — 86780 TREPONEMA PALLIDUM: CPT

## 2018-09-28 LAB
ALBUMIN SERPL-MCNC: 3.8 G/DL (ref 3.1–4.9)
ALPHA-1-GLOBULIN: 0.2 G/DL (ref 0.2–0.4)
ALPHA-2-GLOBULIN: 0.9 G/DL (ref 0.4–1.1)
BETA GLOBULIN: 1.5 G/DL (ref 0.9–1.6)
GAMMA GLOBULIN: 1.4 G/DL (ref 0.6–1.8)
KAPPA, FREE LIGHT CHAINS, SERUM: 23.88 MG/L (ref 3.3–19.4)
KAPPA/LAMBDA RATIO: 1.06 (ref 0.26–1.65)
KAPPA/LAMBDA TEST COMMENT: ABNORMAL
LAMBDA, FREE LIGHT CHAINS, SERUM: 22.63 MG/L (ref 5.71–26.3)
SPE/IFE INTERPRETATION: NORMAL

## 2018-09-30 LAB — LEAD LEVEL BLOOD: <2 UG/DL (ref 0–4.9)

## 2018-10-05 ENCOUNTER — OFFICE VISIT (OUTPATIENT)
Dept: FAMILY MEDICINE CLINIC | Age: 78
End: 2018-10-05
Payer: MEDICARE

## 2018-10-05 VITALS
DIASTOLIC BLOOD PRESSURE: 60 MMHG | WEIGHT: 197.4 LBS | OXYGEN SATURATION: 94 % | SYSTOLIC BLOOD PRESSURE: 110 MMHG | HEART RATE: 122 BPM | BODY MASS INDEX: 37.3 KG/M2

## 2018-10-05 DIAGNOSIS — J41.8 MIXED SIMPLE AND MUCOPURULENT CHRONIC BRONCHITIS (HCC): ICD-10-CM

## 2018-10-05 DIAGNOSIS — G60.9 IDIOPATHIC PERIPHERAL NEUROPATHY: Primary | ICD-10-CM

## 2018-10-05 DIAGNOSIS — E78.00 PURE HYPERCHOLESTEROLEMIA: ICD-10-CM

## 2018-10-05 DIAGNOSIS — Z23 NEED FOR VACCINATION: ICD-10-CM

## 2018-10-05 DIAGNOSIS — I44.0 FIRST DEGREE AV BLOCK: ICD-10-CM

## 2018-10-05 DIAGNOSIS — I49.9 IRREGULAR HEART BEAT: ICD-10-CM

## 2018-10-05 DIAGNOSIS — I10 ESSENTIAL HYPERTENSION: ICD-10-CM

## 2018-10-05 DIAGNOSIS — I47.1 MULTIFOCAL ATRIAL TACHYCARDIA (HCC): ICD-10-CM

## 2018-10-05 DIAGNOSIS — M17.0 PRIMARY OSTEOARTHRITIS OF BOTH KNEES: ICD-10-CM

## 2018-10-05 DIAGNOSIS — E03.9 ACQUIRED HYPOTHYROIDISM: ICD-10-CM

## 2018-10-05 PROBLEM — I47.19 MULTIFOCAL ATRIAL TACHYCARDIA: Status: ACTIVE | Noted: 2018-10-05

## 2018-10-05 PROBLEM — J44.1 COPD EXACERBATION (HCC): Status: RESOLVED | Noted: 2018-02-08 | Resolved: 2018-10-05

## 2018-10-05 PROCEDURE — G8482 FLU IMMUNIZE ORDER/ADMIN: HCPCS | Performed by: FAMILY MEDICINE

## 2018-10-05 PROCEDURE — 99214 OFFICE O/P EST MOD 30 MIN: CPT | Performed by: FAMILY MEDICINE

## 2018-10-05 PROCEDURE — 4040F PNEUMOC VAC/ADMIN/RCVD: CPT | Performed by: FAMILY MEDICINE

## 2018-10-05 PROCEDURE — 1101F PT FALLS ASSESS-DOCD LE1/YR: CPT | Performed by: FAMILY MEDICINE

## 2018-10-05 PROCEDURE — 1123F ACP DISCUSS/DSCN MKR DOCD: CPT | Performed by: FAMILY MEDICINE

## 2018-10-05 PROCEDURE — G8427 DOCREV CUR MEDS BY ELIG CLIN: HCPCS | Performed by: FAMILY MEDICINE

## 2018-10-05 PROCEDURE — G0008 ADMIN INFLUENZA VIRUS VAC: HCPCS | Performed by: FAMILY MEDICINE

## 2018-10-05 PROCEDURE — 3023F SPIROM DOC REV: CPT | Performed by: FAMILY MEDICINE

## 2018-10-05 PROCEDURE — G8926 SPIRO NO PERF OR DOC: HCPCS | Performed by: FAMILY MEDICINE

## 2018-10-05 PROCEDURE — 93040 RHYTHM ECG WITH REPORT: CPT | Performed by: FAMILY MEDICINE

## 2018-10-05 PROCEDURE — 90662 IIV NO PRSV INCREASED AG IM: CPT | Performed by: FAMILY MEDICINE

## 2018-10-05 PROCEDURE — 1036F TOBACCO NON-USER: CPT | Performed by: FAMILY MEDICINE

## 2018-10-05 PROCEDURE — 93000 ELECTROCARDIOGRAM COMPLETE: CPT | Performed by: FAMILY MEDICINE

## 2018-10-05 PROCEDURE — G8417 CALC BMI ABV UP PARAM F/U: HCPCS | Performed by: FAMILY MEDICINE

## 2018-10-05 PROCEDURE — G8399 PT W/DXA RESULTS DOCUMENT: HCPCS | Performed by: FAMILY MEDICINE

## 2018-10-05 PROCEDURE — 1090F PRES/ABSN URINE INCON ASSESS: CPT | Performed by: FAMILY MEDICINE

## 2018-10-05 RX ORDER — ATORVASTATIN CALCIUM 20 MG/1
20 TABLET, FILM COATED ORAL DAILY
Qty: 90 TABLET | Refills: 1 | Status: SHIPPED | OUTPATIENT
Start: 2018-10-05 | End: 2019-11-04

## 2018-10-05 RX ORDER — LOSARTAN POTASSIUM AND HYDROCHLOROTHIAZIDE 12.5; 5 MG/1; MG/1
1 TABLET ORAL DAILY
Qty: 90 TABLET | Refills: 3 | Status: SHIPPED | OUTPATIENT
Start: 2018-10-05 | End: 2019-11-04 | Stop reason: SDUPTHER

## 2018-10-05 ASSESSMENT — ENCOUNTER SYMPTOMS
CONSTIPATION: 0
BACK PAIN: 1
SHORTNESS OF BREATH: 0
WHEEZING: 0
ABDOMINAL PAIN: 0
DIARRHEA: 0
COUGH: 0

## 2018-10-05 ASSESSMENT — PATIENT HEALTH QUESTIONNAIRE - PHQ9
1. LITTLE INTEREST OR PLEASURE IN DOING THINGS: 0
SUM OF ALL RESPONSES TO PHQ QUESTIONS 1-9: 0
2. FEELING DOWN, DEPRESSED OR HOPELESS: 0
SUM OF ALL RESPONSES TO PHQ9 QUESTIONS 1 & 2: 0
SUM OF ALL RESPONSES TO PHQ QUESTIONS 1-9: 0

## 2018-12-26 ENCOUNTER — TELEPHONE (OUTPATIENT)
Dept: FAMILY MEDICINE CLINIC | Age: 78
End: 2018-12-26

## 2018-12-26 ENCOUNTER — OFFICE VISIT (OUTPATIENT)
Dept: FAMILY MEDICINE CLINIC | Age: 78
End: 2018-12-26
Payer: MEDICARE

## 2018-12-26 VITALS
WEIGHT: 200 LBS | SYSTOLIC BLOOD PRESSURE: 108 MMHG | TEMPERATURE: 99.7 F | BODY MASS INDEX: 37.79 KG/M2 | DIASTOLIC BLOOD PRESSURE: 64 MMHG | OXYGEN SATURATION: 97 % | HEART RATE: 85 BPM

## 2018-12-26 DIAGNOSIS — J06.9 VIRAL URI: Primary | ICD-10-CM

## 2018-12-26 PROCEDURE — G8482 FLU IMMUNIZE ORDER/ADMIN: HCPCS | Performed by: FAMILY MEDICINE

## 2018-12-26 PROCEDURE — 99212 OFFICE O/P EST SF 10 MIN: CPT | Performed by: FAMILY MEDICINE

## 2018-12-26 PROCEDURE — G8417 CALC BMI ABV UP PARAM F/U: HCPCS | Performed by: FAMILY MEDICINE

## 2018-12-26 PROCEDURE — 1090F PRES/ABSN URINE INCON ASSESS: CPT | Performed by: FAMILY MEDICINE

## 2018-12-26 PROCEDURE — 1101F PT FALLS ASSESS-DOCD LE1/YR: CPT | Performed by: FAMILY MEDICINE

## 2018-12-26 PROCEDURE — G8427 DOCREV CUR MEDS BY ELIG CLIN: HCPCS | Performed by: FAMILY MEDICINE

## 2018-12-26 PROCEDURE — 1036F TOBACCO NON-USER: CPT | Performed by: FAMILY MEDICINE

## 2018-12-26 PROCEDURE — 4040F PNEUMOC VAC/ADMIN/RCVD: CPT | Performed by: FAMILY MEDICINE

## 2018-12-26 PROCEDURE — G8399 PT W/DXA RESULTS DOCUMENT: HCPCS | Performed by: FAMILY MEDICINE

## 2018-12-26 PROCEDURE — 1123F ACP DISCUSS/DSCN MKR DOCD: CPT | Performed by: FAMILY MEDICINE

## 2019-01-04 ENCOUNTER — OFFICE VISIT (OUTPATIENT)
Dept: FAMILY MEDICINE CLINIC | Age: 79
End: 2019-01-04
Payer: MEDICARE

## 2019-01-04 VITALS
DIASTOLIC BLOOD PRESSURE: 80 MMHG | SYSTOLIC BLOOD PRESSURE: 114 MMHG | HEART RATE: 81 BPM | WEIGHT: 200 LBS | OXYGEN SATURATION: 96 % | BODY MASS INDEX: 37.79 KG/M2

## 2019-01-04 DIAGNOSIS — J41.8 MIXED SIMPLE AND MUCOPURULENT CHRONIC BRONCHITIS (HCC): ICD-10-CM

## 2019-01-04 DIAGNOSIS — E03.9 ACQUIRED HYPOTHYROIDISM: ICD-10-CM

## 2019-01-04 DIAGNOSIS — I10 ESSENTIAL HYPERTENSION: Primary | ICD-10-CM

## 2019-01-04 DIAGNOSIS — E78.00 PURE HYPERCHOLESTEROLEMIA: ICD-10-CM

## 2019-01-04 DIAGNOSIS — I47.1 MULTIFOCAL ATRIAL TACHYCARDIA (HCC): ICD-10-CM

## 2019-01-04 DIAGNOSIS — G60.9 IDIOPATHIC PERIPHERAL NEUROPATHY: ICD-10-CM

## 2019-01-04 DIAGNOSIS — R53.82 CHRONIC FATIGUE: ICD-10-CM

## 2019-01-04 DIAGNOSIS — M15.9 GENERALIZED OSTEOARTHROSIS, INVOLVING MULTIPLE SITES: ICD-10-CM

## 2019-01-04 PROCEDURE — G8427 DOCREV CUR MEDS BY ELIG CLIN: HCPCS | Performed by: FAMILY MEDICINE

## 2019-01-04 PROCEDURE — G8926 SPIRO NO PERF OR DOC: HCPCS | Performed by: FAMILY MEDICINE

## 2019-01-04 PROCEDURE — G8482 FLU IMMUNIZE ORDER/ADMIN: HCPCS | Performed by: FAMILY MEDICINE

## 2019-01-04 PROCEDURE — 1036F TOBACCO NON-USER: CPT | Performed by: FAMILY MEDICINE

## 2019-01-04 PROCEDURE — 1101F PT FALLS ASSESS-DOCD LE1/YR: CPT | Performed by: FAMILY MEDICINE

## 2019-01-04 PROCEDURE — 1090F PRES/ABSN URINE INCON ASSESS: CPT | Performed by: FAMILY MEDICINE

## 2019-01-04 PROCEDURE — 1123F ACP DISCUSS/DSCN MKR DOCD: CPT | Performed by: FAMILY MEDICINE

## 2019-01-04 PROCEDURE — G8417 CALC BMI ABV UP PARAM F/U: HCPCS | Performed by: FAMILY MEDICINE

## 2019-01-04 PROCEDURE — 4040F PNEUMOC VAC/ADMIN/RCVD: CPT | Performed by: FAMILY MEDICINE

## 2019-01-04 PROCEDURE — 3023F SPIROM DOC REV: CPT | Performed by: FAMILY MEDICINE

## 2019-01-04 PROCEDURE — 99214 OFFICE O/P EST MOD 30 MIN: CPT | Performed by: FAMILY MEDICINE

## 2019-01-04 PROCEDURE — G8399 PT W/DXA RESULTS DOCUMENT: HCPCS | Performed by: FAMILY MEDICINE

## 2019-01-04 ASSESSMENT — ENCOUNTER SYMPTOMS
DIARRHEA: 0
CHEST TIGHTNESS: 1
COUGH: 1
CONSTIPATION: 0
RHINORRHEA: 0
BACK PAIN: 1
WHEEZING: 1
SHORTNESS OF BREATH: 1

## 2019-01-04 ASSESSMENT — COPD QUESTIONNAIRES: COPD: 1

## 2019-01-23 ENCOUNTER — TELEPHONE (OUTPATIENT)
Dept: FAMILY MEDICINE CLINIC | Age: 79
End: 2019-01-23

## 2019-01-30 RX ORDER — LEVOTHYROXINE SODIUM 0.1 MG/1
TABLET ORAL
Qty: 90 TABLET | Refills: 0 | Status: SHIPPED | OUTPATIENT
Start: 2019-01-30 | End: 2019-05-03 | Stop reason: SDUPTHER

## 2019-04-03 RX ORDER — ALBUTEROL SULFATE 90 UG/1
AEROSOL, METERED RESPIRATORY (INHALATION)
Qty: 8.5 G | Refills: 0 | Status: SHIPPED | OUTPATIENT
Start: 2019-04-03 | End: 2019-05-03 | Stop reason: SDUPTHER

## 2019-04-03 NOTE — TELEPHONE ENCOUNTER
Medication:   Requested Prescriptions     Pending Prescriptions Disp Refills    albuterol sulfate  (90 Base) MCG/ACT inhaler [Pharmacy Med Name: ALBUTEROL HFA INH (200 PUFFS) 8.5GM] 8.5 g 0     Sig: INHALE 2 PUFFS INTO LUNGS EVERY 6 HOURS AS NEEDED FOR SHORTNESS OF BREATH OR WHEEZING      Last Filled:  3/26/2018    Patient Phone Number: [phone] (home)     Last appt: 1/4/2019   Next appt: 5/3/2019    Last OARRS:   RX Monitoring 12/21/2016   Attestation The Prescription Monitoring Report for this patient was reviewed today. Chronic Pain Routine Monitoring Possible medication side effects, risk of tolerance and/or dependence, and alternative treatments discussed; No signs of potential drug abuse or diversion identified.        Preferred Pharmacy:   [place] [address] [facility] [address] [facility] [phone] [address] [phone]  [address]  [facility] [address]  Phone: [phone] Fax: [phone]

## 2019-05-03 ENCOUNTER — OFFICE VISIT (OUTPATIENT)
Dept: FAMILY MEDICINE CLINIC | Age: 79
End: 2019-05-03
Payer: MEDICARE

## 2019-05-03 VITALS
HEART RATE: 100 BPM | OXYGEN SATURATION: 95 % | SYSTOLIC BLOOD PRESSURE: 110 MMHG | DIASTOLIC BLOOD PRESSURE: 80 MMHG | BODY MASS INDEX: 37.6 KG/M2 | WEIGHT: 199 LBS

## 2019-05-03 DIAGNOSIS — J44.9 CHRONIC OBSTRUCTIVE PULMONARY DISEASE, UNSPECIFIED COPD TYPE (HCC): ICD-10-CM

## 2019-05-03 DIAGNOSIS — J30.9 ALLERGIC RHINITIS, UNSPECIFIED SEASONALITY, UNSPECIFIED TRIGGER: ICD-10-CM

## 2019-05-03 DIAGNOSIS — G62.9 NEUROPATHY: ICD-10-CM

## 2019-05-03 DIAGNOSIS — I10 ESSENTIAL HYPERTENSION: ICD-10-CM

## 2019-05-03 DIAGNOSIS — E78.00 PURE HYPERCHOLESTEROLEMIA: ICD-10-CM

## 2019-05-03 DIAGNOSIS — E03.9 ACQUIRED HYPOTHYROIDISM: Primary | ICD-10-CM

## 2019-05-03 PROCEDURE — G8417 CALC BMI ABV UP PARAM F/U: HCPCS | Performed by: FAMILY MEDICINE

## 2019-05-03 PROCEDURE — G8427 DOCREV CUR MEDS BY ELIG CLIN: HCPCS | Performed by: FAMILY MEDICINE

## 2019-05-03 PROCEDURE — G8399 PT W/DXA RESULTS DOCUMENT: HCPCS | Performed by: FAMILY MEDICINE

## 2019-05-03 PROCEDURE — 1123F ACP DISCUSS/DSCN MKR DOCD: CPT | Performed by: FAMILY MEDICINE

## 2019-05-03 PROCEDURE — 1090F PRES/ABSN URINE INCON ASSESS: CPT | Performed by: FAMILY MEDICINE

## 2019-05-03 PROCEDURE — 1036F TOBACCO NON-USER: CPT | Performed by: FAMILY MEDICINE

## 2019-05-03 PROCEDURE — 4040F PNEUMOC VAC/ADMIN/RCVD: CPT | Performed by: FAMILY MEDICINE

## 2019-05-03 PROCEDURE — 99214 OFFICE O/P EST MOD 30 MIN: CPT | Performed by: FAMILY MEDICINE

## 2019-05-03 PROCEDURE — G8926 SPIRO NO PERF OR DOC: HCPCS | Performed by: FAMILY MEDICINE

## 2019-05-03 PROCEDURE — 3023F SPIROM DOC REV: CPT | Performed by: FAMILY MEDICINE

## 2019-05-03 RX ORDER — LEVOTHYROXINE SODIUM 0.1 MG/1
TABLET ORAL
Qty: 90 TABLET | Refills: 1 | Status: SHIPPED | OUTPATIENT
Start: 2019-05-03 | End: 2019-11-04 | Stop reason: SDUPTHER

## 2019-05-03 RX ORDER — FLUTICASONE PROPIONATE 50 MCG
2 SPRAY, SUSPENSION (ML) NASAL DAILY PRN
Qty: 1 BOTTLE | Refills: 2 | Status: SHIPPED | OUTPATIENT
Start: 2019-05-03 | End: 2020-12-15 | Stop reason: SDUPTHER

## 2019-05-03 RX ORDER — GABAPENTIN 100 MG/1
100 CAPSULE ORAL 3 TIMES DAILY
Qty: 90 CAPSULE | Refills: 5 | Status: SHIPPED | OUTPATIENT
Start: 2019-05-03 | End: 2019-09-26 | Stop reason: CLARIF

## 2019-05-03 RX ORDER — ALBUTEROL SULFATE 90 UG/1
AEROSOL, METERED RESPIRATORY (INHALATION)
Qty: 8.5 G | Refills: 5 | Status: SHIPPED | OUTPATIENT
Start: 2019-05-03 | End: 2020-12-08 | Stop reason: SDUPTHER

## 2019-05-03 ASSESSMENT — PATIENT HEALTH QUESTIONNAIRE - PHQ9
SUM OF ALL RESPONSES TO PHQ9 QUESTIONS 1 & 2: 1
SUM OF ALL RESPONSES TO PHQ QUESTIONS 1-9: 1
1. LITTLE INTEREST OR PLEASURE IN DOING THINGS: 0
2. FEELING DOWN, DEPRESSED OR HOPELESS: 1
SUM OF ALL RESPONSES TO PHQ QUESTIONS 1-9: 1

## 2019-05-03 ASSESSMENT — ENCOUNTER SYMPTOMS
RHINORRHEA: 0
CHEST TIGHTNESS: 1
ABDOMINAL PAIN: 0
SORE THROAT: 0
CONSTIPATION: 0
BACK PAIN: 1
WHEEZING: 1
DIARRHEA: 0
SHORTNESS OF BREATH: 0
COUGH: 1

## 2019-05-03 ASSESSMENT — COPD QUESTIONNAIRES: COPD: 1

## 2019-05-03 NOTE — PROGRESS NOTES
include exercise and diet. Back Pain   This is a chronic problem. Episode onset: worse in last 5 years. The problem occurs constantly. The pain is present in the lumbar spine. The pain radiates to the left foot and right foot (Legs and feet with a burning feeling). The pain is moderate. Associated symptoms include numbness and paresthesias. Pertinent negatives include no abdominal pain, chest pain or fever. Risk factors include sedentary lifestyle, obesity and lack of exercise. She has tried NSAIDs, analgesics and ice for the symptoms. Dizziness   This is a recurrent problem. The current episode started 1 to 4 weeks ago. The problem occurs rarely. The problem has been resolved. Associated symptoms include coughing, numbness and vertigo. Pertinent negatives include no abdominal pain, chest pain, fever or sore throat. Nothing aggravates the symptoms. She has tried nothing for the symptoms. Patient describes what sounds like true vertigo with the dizziness occurring even in bed. Symptoms are currently resolved. She's never been on oral meds for this. I did discuss the possibility of therapy teaching her Epley maneuvers if she has a reoccurrence of symptoms. Patient's medications, allergies, past medical,surgical, social and family histories were reviewed and updated as appropriate.      Past Medical History:   Diagnosis Date    Arthritis     Asthma     [facility] ([facility]ronic obstructive [facility] disease) [facility]     Diabetes mellitus [facility]     Hyperlipidemia     Hypertension     Influenza 02/08/2018    Thyroid disease      Past Surgical History:   Procedure Laterality Date    COLONOSCOPY       Family History   Problem Relation Age of Onset    Heart Disease Mother     High Blood Pressure Mother     Stroke Mother     Heart Disease Sister     High Blood Pressure Sister     Cancer Brother         lung     Social History     Socioeconomic History    Marital status:      Spouse name: Not on file    Number of children: Not on file    Years of education: Not on file    Highest education level: Not on file   Occupational History    Not on file   Social Needs    Financial resource strain: Not on file    Food insecurity:     Worry: Not on file     Inability: Not on file    Transportation needs:     Medical: Not on file     Non-medical: Not on file   Tobacco Use    Smoking status: Former Smoker     Packs/day: 1.00     Years: 10.00     Pack years: 10.00     Types: Cigarettes     Last attempt to quit: 10/5/1998     Years since quittin.5    Smokeless tobacco: Never Used    Tobacco comment: was only smoking socially on and off   Substance and Sexual Activity    Alcohol use: No     Alcohol/week: 0.0 oz    Drug use: No    Sexual activity: Not on file   Lifestyle    Physical activity:     Days per week: Not on file     Minutes per session: Not on file    Stress: Not on file   Relationships    Social connections:     Talks on phone: Not on file     Gets together: Not on file     Attends Dru[employer] service: Not on file     Active member of club or organization: Not on file     Attends meetings of clubs or organizations: Not on file     Relationship status: Not on file    Intimate partner violence:     Fear of current or ex partner: Not on file     Emotionally abused: Not on file     Physically abused: Not on file     Forced sexual activity: Not on file   Other Topics Concern    Not on file   Social History Narrative    Not on file     Allergies   Allergen Reactions    Aspirin      Recurrent GI blood Loss?  Sulfa Antibiotics      Other reaction(s):  Other (See Comments)  hallucinations  hallucinations    Ace Inhibitors Palpitations     Swelling and cough  cough     Current Outpatient Medications   Medication Sig Dispense Refill    fluticasone (FLONASE) 50 MCG/ACT nasal spray 2 sprays by Nasal route daily as needed (as needed) 1 Bottle 2    levothyroxine (SYNTHROID) 100 MCG tablet TAKE 1 TABLET BY MOUTH EVERY DAY 90 tablet 1    albuterol sulfate  (90 Base) MCG/ACT inhaler INHALE 2 PUFFS INTO LUNGS EVERY 6 HOURS AS NEEDED FOR SHORTNESS OF BREATH OR WHEEZING 8.5 g 5    gabapentin (NEURONTIN) 100 MG capsule Take 1 capsule by mouth 3 times daily for 180 days. Intended supply: 30 days 90 capsule 5    ipratropium (ATROVENT) 0.02 % nebulizer solution Take 2.5 mLs by nebulization 4 times daily 75 mL 1    tiotropium (SPIRIVA HANDIHALER) 18 MCG inhalation capsule Inhale 1 capsule into the lungs daily 30 capsule 6    atorvastatin (LIPITOR) 20 MG tablet Take 1 tablet by mouth daily 90 tablet 1    losartan-hydrochlorothiazide (HYZAAR) 50-12.5 MG per tablet Take 1 tablet by mouth daily 90 tablet 3    cetirizine (ZYRTEC) 10 MG tablet Take 10 mg by mouth daily      albuterol (PROVENTIL) (2.5 MG/3ML) 0.083% nebulizer solution Take 3 mLs by nebulization every 6 hours as needed for Wheezing 120 each 1    fluticasone-salmeterol (ADVAIR) 250-50 MCG/DOSE AEPB Inhale 1 puff into the lungs every 12 hours 1 each 6    acetaminophen (TYLENOL) 500 MG tablet Take 1,000 mg by mouth daily as needed        No current facility-administered medications for this visit. Review of Systems   Constitutional: Negative for fever. HENT: Negative. Negative for ear pain, rhinorrhea, sneezing and sore throat. Respiratory: Positive for cough, chest tightness and wheezing. Negative for shortness of breath. Cardiovascular: Negative for chest pain and palpitations. Gastrointestinal: Negative for abdominal pain, constipation and diarrhea. Genitourinary: Negative for difficulty urinating. Musculoskeletal: Positive for back pain. Neurological: Positive for dizziness, vertigo, numbness and paresthesias. Psychiatric/Behavioral: Negative for dysphoric mood. The patient is not nervous/anxious.         OBJECTIVE:    /80   Pulse 100   Wt 199 lb (90.3 kg)   SpO2 95%   BMI 37.60 kg/m²    Physical Exam Constitutional: She is oriented to person, place, and time. She appears well-developed and well-nourished. Obese   HENT:   Head: Normocephalic and atraumatic. Right Ear: External ear normal.   Left Ear: External ear normal.   Nose: Nose normal.   Mouth/Throat: Oropharynx is clear and moist.   Eyes: Conjunctivae and EOM are normal. Right eye exhibits no discharge. Neck: Normal range of motion. Neck supple. No JVD present. No tracheal deviation present. No thyromegaly present. Cardiovascular: Normal rate, regular rhythm and normal heart sounds. Pulmonary/Chest: Effort normal and breath sounds normal. No respiratory distress. She has no rales. Lymphadenopathy:     She has no cervical adenopathy. Neurological: She is alert and oriented to person, place, and time. Skin: Skin is warm and dry. Psychiatric: She has a normal mood and affect. ASSESSMENT/PLAN:    Cliff Cadena was seen today for follow-up. Diagnoses and all orders for this visit:    Acquired hypothyroidism  -     levothyroxine (SYNTHROID) 100 MCG tablet; TAKE 1 TABLET BY MOUTH EVERY DAY  -     TSH with Reflex; Future    Essential hypertension  Stable on current medication    Pure hypercholesterolemia  -     Comprehensive Metabolic Panel, Fasting; Future  -     CBC Auto Differential; Future  -     Lipid, Fasting; Future    Chronic obstructive pulmonary disease, unspecified COPD type (HCC)  -     albuterol sulfate  (90 Base) MCG/ACT inhaler; INHALE 2 PUFFS INTO LUNGS EVERY 6 HOURS AS NEEDED FOR SHORTNESS OF BREATH OR WHEEZING    Allergic rhinitis, unspecified seasonality, unspecified trigger  -     fluticasone (FLONASE) 50 MCG/ACT nasal spray; 2 sprays by Nasal route daily as needed (as needed)    Neuropathy  -     gabapentin (NEURONTIN) 100 MG capsule; Take 1 capsule by mouth 3 times daily for 180 days.  Intended supply: 30 days    Start one capsule of Gabapentin at bedtime and increase by one capsule every 2 weeks until taking 3

## 2019-06-10 ENCOUNTER — OFFICE VISIT (OUTPATIENT)
Dept: FAMILY MEDICINE CLINIC | Age: 79
End: 2019-06-10
Payer: MEDICARE

## 2019-06-10 VITALS
TEMPERATURE: 100.6 F | DIASTOLIC BLOOD PRESSURE: 72 MMHG | OXYGEN SATURATION: 97 % | WEIGHT: 199 LBS | HEART RATE: 102 BPM | BODY MASS INDEX: 37.6 KG/M2 | SYSTOLIC BLOOD PRESSURE: 120 MMHG

## 2019-06-10 DIAGNOSIS — J01.90 ACUTE BACTERIAL SINUSITIS: Primary | ICD-10-CM

## 2019-06-10 DIAGNOSIS — B96.89 ACUTE BACTERIAL SINUSITIS: Primary | ICD-10-CM

## 2019-06-10 PROCEDURE — 1123F ACP DISCUSS/DSCN MKR DOCD: CPT | Performed by: FAMILY MEDICINE

## 2019-06-10 PROCEDURE — 1090F PRES/ABSN URINE INCON ASSESS: CPT | Performed by: FAMILY MEDICINE

## 2019-06-10 PROCEDURE — 99213 OFFICE O/P EST LOW 20 MIN: CPT | Performed by: FAMILY MEDICINE

## 2019-06-10 PROCEDURE — 1036F TOBACCO NON-USER: CPT | Performed by: FAMILY MEDICINE

## 2019-06-10 PROCEDURE — G8399 PT W/DXA RESULTS DOCUMENT: HCPCS | Performed by: FAMILY MEDICINE

## 2019-06-10 PROCEDURE — G8417 CALC BMI ABV UP PARAM F/U: HCPCS | Performed by: FAMILY MEDICINE

## 2019-06-10 PROCEDURE — G8428 CUR MEDS NOT DOCUMENT: HCPCS | Performed by: FAMILY MEDICINE

## 2019-06-10 PROCEDURE — 4040F PNEUMOC VAC/ADMIN/RCVD: CPT | Performed by: FAMILY MEDICINE

## 2019-06-10 RX ORDER — AMOXICILLIN 875 MG/1
875 TABLET, COATED ORAL 2 TIMES DAILY
Qty: 20 TABLET | Refills: 0 | Status: SHIPPED | OUTPATIENT
Start: 2019-06-10 | End: 2019-06-20

## 2019-06-10 RX ORDER — GUAIFENESIN, PSEUDOEPHEDRINE HYDROCHLORIDE 600; 60 MG/1; MG/1
1 TABLET, EXTENDED RELEASE ORAL EVERY 12 HOURS
Qty: 20 TABLET | Refills: 1 | Status: SHIPPED | OUTPATIENT
Start: 2019-06-10 | End: 2019-06-20

## 2019-06-10 RX ORDER — BENZONATATE 200 MG/1
200 CAPSULE ORAL 3 TIMES DAILY PRN
Qty: 30 CAPSULE | Refills: 0 | Status: SHIPPED | OUTPATIENT
Start: 2019-06-10 | End: 2019-06-17

## 2019-06-10 ASSESSMENT — ENCOUNTER SYMPTOMS
VOMITING: 0
SORE THROAT: 1
COUGH: 1
ABDOMINAL PAIN: 0
NAUSEA: 1

## 2019-06-10 NOTE — PROGRESS NOTES
SUBJECTIVE:    Cynthia Davila is a 70 y.o. female who presents for a follow up visit. Chief Complaint   Patient presents with    Pharyngitis        Pharyngitis   This is a new problem. The current episode started in the past 7 days. The problem occurs constantly. Associated symptoms include congestion, coughing, a fever, headaches, nausea, a sore throat and weakness. Pertinent negatives include no abdominal pain or vomiting. She has tried acetaminophen for the symptoms. The treatment provided mild relief. Patient's medications, allergies, past medical,surgical, social and family histories were reviewed and updated as appropriate.      Past Medical History:   Diagnosis Date    Arthritis     Asthma     [facility]     Diabetes mellitus ([facility]     Hyperlipidemia     Hypertension     Influenza 2018    Thyroid disease      Past Surgical History:   Procedure Laterality Date    COLONOSCOPY       Family History   Problem Relation Age of Onset    Heart Disease Mother     High Blood Pressure Mother     Stroke Mother     Heart Disease Sister     High Blood Pressure Sister     Cancer Brother         lung     Social History     Socioeconomic History    Marital status:      Spouse name: Not on file    Number of children: Not on file    Years of education: Not on file    Highest education level: Not on file   Occupational History    Not on file   Social Needs    Financial resource strain: Not on file    Food insecurity:     Worry: Not on file     Inability: Not on file    Transportation needs:     Medical: Not on file     Non-medical: Not on file   Tobacco Use    Smoking status: Former Smoker     Packs/day: 1.00     Years: 10.00     Pack years: 10.00     Types: Cigarettes     Last attempt to quit: 10/5/1998     Years since quittin.6    Smokeless tobacco: Never Used    Tobacco comment: was only smoking socially on and off   Substance and capsule 5    ipratropium (ATROVENT) 0.02 % nebulizer solution Take 2.5 mLs by nebulization 4 times daily 75 mL 1    tiotropium (SPIRIVA HANDIHALER) 18 MCG inhalation capsule Inhale 1 capsule into the lungs daily 30 capsule 6    atorvastatin (LIPITOR) 20 MG tablet Take 1 tablet by mouth daily 90 tablet 1    losartan-hydrochlorothiazide (HYZAAR) 50-12.5 MG per tablet Take 1 tablet by mouth daily 90 tablet 3    cetirizine (ZYRTEC) 10 MG tablet Take 10 mg by mouth daily      albuterol (PROVENTIL) (2.5 MG/3ML) 0.083% nebulizer solution Take 3 mLs by nebulization every 6 hours as needed for Wheezing 120 each 1    fluticasone-salmeterol (ADVAIR) 250-50 MCG/DOSE AEPB Inhale 1 puff into the lungs every 12 hours 1 each 6    acetaminophen (TYLENOL) 500 MG tablet Take 1,000 mg by mouth daily as needed        No current facility-administered medications for this visit. Review of Systems   Constitutional: Positive for fever. HENT: Positive for congestion and sore throat. Respiratory: Positive for cough. Gastrointestinal: Positive for nausea. Negative for abdominal pain and vomiting. Neurological: Positive for weakness and headaches. OBJECTIVE:    /72   Pulse 102   Temp 100.6 °F (38.1 °C)   Wt 199 lb (90.3 kg)   SpO2 97%   BMI 37.60 kg/m²    Physical Exam   Constitutional: She appears well-developed and well-nourished. She is cooperative. No distress. HENT:   Right Ear: Tympanic membrane and ear canal normal.   Left Ear: Tympanic membrane and ear canal normal.   Nose: Mucosal edema and rhinorrhea (purulent) present. Right sinus exhibits maxillary sinus tenderness. Right sinus exhibits no frontal sinus tenderness. Left sinus exhibits maxillary sinus tenderness. Left sinus exhibits no frontal sinus tenderness. Mouth/Throat: Mucous membranes are normal. Posterior oropharyngeal erythema present. No oropharyngeal exudate.    Pulmonary/Chest: Effort normal and breath sounds normal. Lymphadenopathy:     She has no cervical adenopathy. Neurological: She is alert. ASSESSMENT/PLAN:    Nehemias Crowell was seen today for pharyngitis. Diagnoses and all orders for this visit:    Acute bacterial sinusitis  -     amoxicillin (AMOXIL) 875 MG tablet; Take 1 tablet by mouth 2 times daily for 10 days  -     benzonatate (TESSALON) 200 MG capsule; Take 1 capsule by mouth 3 times daily as needed for Cough  -     pseudoephedrine-guaiFENesin (MUCINEX D)  MG per extended release tablet; Take 1 tablet by mouth every 12 hours for 10 days        Return for regularly scheduled follow-up. Please note portions of this note were completed with a voicerecognition program.  Efforts were made to edit the dictations but occasionally words are mis-transcribed.

## 2019-09-26 ENCOUNTER — OFFICE VISIT (OUTPATIENT)
Dept: FAMILY MEDICINE CLINIC | Age: 79
End: 2019-09-26
Payer: MEDICARE

## 2019-09-26 VITALS
TEMPERATURE: 98.6 F | OXYGEN SATURATION: 98 % | DIASTOLIC BLOOD PRESSURE: 70 MMHG | HEART RATE: 81 BPM | WEIGHT: 193 LBS | SYSTOLIC BLOOD PRESSURE: 124 MMHG | BODY MASS INDEX: 36.47 KG/M2

## 2019-09-26 DIAGNOSIS — J30.9 ALLERGIC RHINITIS, UNSPECIFIED SEASONALITY, UNSPECIFIED TRIGGER: Primary | ICD-10-CM

## 2019-09-26 DIAGNOSIS — J41.8 MIXED SIMPLE AND MUCOPURULENT CHRONIC BRONCHITIS (HCC): ICD-10-CM

## 2019-09-26 PROCEDURE — 3023F SPIROM DOC REV: CPT | Performed by: FAMILY MEDICINE

## 2019-09-26 PROCEDURE — 1036F TOBACCO NON-USER: CPT | Performed by: FAMILY MEDICINE

## 2019-09-26 PROCEDURE — 1090F PRES/ABSN URINE INCON ASSESS: CPT | Performed by: FAMILY MEDICINE

## 2019-09-26 PROCEDURE — G8417 CALC BMI ABV UP PARAM F/U: HCPCS | Performed by: FAMILY MEDICINE

## 2019-09-26 PROCEDURE — G8399 PT W/DXA RESULTS DOCUMENT: HCPCS | Performed by: FAMILY MEDICINE

## 2019-09-26 PROCEDURE — G8926 SPIRO NO PERF OR DOC: HCPCS | Performed by: FAMILY MEDICINE

## 2019-09-26 PROCEDURE — G8427 DOCREV CUR MEDS BY ELIG CLIN: HCPCS | Performed by: FAMILY MEDICINE

## 2019-09-26 PROCEDURE — 1123F ACP DISCUSS/DSCN MKR DOCD: CPT | Performed by: FAMILY MEDICINE

## 2019-09-26 PROCEDURE — 96372 THER/PROPH/DIAG INJ SC/IM: CPT | Performed by: FAMILY MEDICINE

## 2019-09-26 PROCEDURE — 4040F PNEUMOC VAC/ADMIN/RCVD: CPT | Performed by: FAMILY MEDICINE

## 2019-09-26 PROCEDURE — 99213 OFFICE O/P EST LOW 20 MIN: CPT | Performed by: FAMILY MEDICINE

## 2019-09-26 RX ORDER — IPRATROPIUM BROMIDE AND ALBUTEROL SULFATE 2.5; .5 MG/3ML; MG/3ML
1 SOLUTION RESPIRATORY (INHALATION) EVERY 6 HOURS
Qty: 360 ML | Refills: 3 | Status: SHIPPED | OUTPATIENT
Start: 2019-09-26 | End: 2019-09-27 | Stop reason: SDUPTHER

## 2019-09-26 RX ORDER — METHYLPREDNISOLONE ACETATE 80 MG/ML
80 INJECTION, SUSPENSION INTRA-ARTICULAR; INTRALESIONAL; INTRAMUSCULAR; SOFT TISSUE ONCE
Status: COMPLETED | OUTPATIENT
Start: 2019-09-26 | End: 2019-09-26

## 2019-09-26 RX ADMIN — METHYLPREDNISOLONE ACETATE 80 MG: 80 INJECTION, SUSPENSION INTRA-ARTICULAR; INTRALESIONAL; INTRAMUSCULAR; SOFT TISSUE at 10:28

## 2019-09-27 RX ORDER — IPRATROPIUM BROMIDE AND ALBUTEROL SULFATE 2.5; .5 MG/3ML; MG/3ML
SOLUTION RESPIRATORY (INHALATION)
Qty: 360 VIAL | Refills: 3 | Status: ON HOLD | OUTPATIENT
Start: 2019-09-27 | End: 2021-10-09 | Stop reason: HOSPADM

## 2019-09-28 RX ORDER — IPRATROPIUM BROMIDE AND ALBUTEROL SULFATE 2.5; .5 MG/3ML; MG/3ML
SOLUTION RESPIRATORY (INHALATION)
Qty: 1080 ML | Refills: 3 | OUTPATIENT
Start: 2019-09-28

## 2019-09-28 NOTE — TELEPHONE ENCOUNTER
Medication:   Requested Prescriptions     Pending Prescriptions Disp Refills    ipratropium-albuterol (DUONEB) 0.5-2.5 (3) MG/3ML SOLN nebulizer solution [Pharmacy Med Name: IPRATROPI/ALB 0.5/3MG INH SL 60X3ML] 1080 mL 3     Sig: USE 1 VIAL VIA NEBULIZER EVERY 6 HOURS      Last Filled:  9/27/2019    Patient Phone Number: [phone] (home)     Last appt: 9/26/2019   Next appt: 11/4/2019    Last OARRS:   RX Monitoring 12/21/2016   Attestation The Prescription Monitoring Report for this patient was reviewed today. Periodic Controlled Substance Monitoring Possible medication side effects, risk of tolerance and/or dependence, and alternative treatments discussed; No signs of potential drug abuse or diversion identified.        Preferred Pharmacy:   [address] [phone] Perry Braga [phone]  Aric Geechad [phone] [facility] [address]  Phone: [phone] Fax: [phone]    [facility] [address] [place] [address], [address] [place] [address] [phone] - F [phone]  [address] [place] [address]  Phone: [phone] Fax: [phone]    [facility] [address] [address], [address] [facility] [address]  [address]  [address] [facility] [address]  Phone: [phone] Fax: [phone]

## 2019-10-02 ENCOUNTER — TELEPHONE (OUTPATIENT)
Dept: RHEUMATOLOGY | Age: 79
End: 2019-10-02

## 2019-11-04 ENCOUNTER — OFFICE VISIT (OUTPATIENT)
Dept: FAMILY MEDICINE CLINIC | Age: 79
End: 2019-11-04
Payer: MEDICARE

## 2019-11-04 VITALS
HEART RATE: 76 BPM | WEIGHT: 191 LBS | DIASTOLIC BLOOD PRESSURE: 80 MMHG | BODY MASS INDEX: 36.09 KG/M2 | OXYGEN SATURATION: 96 % | SYSTOLIC BLOOD PRESSURE: 120 MMHG

## 2019-11-04 DIAGNOSIS — E78.00 PURE HYPERCHOLESTEROLEMIA: Primary | ICD-10-CM

## 2019-11-04 DIAGNOSIS — E03.9 ACQUIRED HYPOTHYROIDISM: ICD-10-CM

## 2019-11-04 DIAGNOSIS — I10 ESSENTIAL HYPERTENSION: ICD-10-CM

## 2019-11-04 DIAGNOSIS — K21.9 GASTROESOPHAGEAL REFLUX DISEASE WITHOUT ESOPHAGITIS: ICD-10-CM

## 2019-11-04 DIAGNOSIS — R07.9 CHEST PAIN, UNSPECIFIED TYPE: ICD-10-CM

## 2019-11-04 DIAGNOSIS — G60.9 IDIOPATHIC PERIPHERAL NEUROPATHY: ICD-10-CM

## 2019-11-04 DIAGNOSIS — Z23 NEED FOR INFLUENZA VACCINATION: ICD-10-CM

## 2019-11-04 PROCEDURE — G0008 ADMIN INFLUENZA VIRUS VAC: HCPCS | Performed by: FAMILY MEDICINE

## 2019-11-04 PROCEDURE — G8482 FLU IMMUNIZE ORDER/ADMIN: HCPCS | Performed by: FAMILY MEDICINE

## 2019-11-04 PROCEDURE — 99214 OFFICE O/P EST MOD 30 MIN: CPT | Performed by: FAMILY MEDICINE

## 2019-11-04 PROCEDURE — 90653 IIV ADJUVANT VACCINE IM: CPT | Performed by: FAMILY MEDICINE

## 2019-11-04 PROCEDURE — G8399 PT W/DXA RESULTS DOCUMENT: HCPCS | Performed by: FAMILY MEDICINE

## 2019-11-04 PROCEDURE — 93000 ELECTROCARDIOGRAM COMPLETE: CPT | Performed by: FAMILY MEDICINE

## 2019-11-04 PROCEDURE — 1090F PRES/ABSN URINE INCON ASSESS: CPT | Performed by: FAMILY MEDICINE

## 2019-11-04 PROCEDURE — G8417 CALC BMI ABV UP PARAM F/U: HCPCS | Performed by: FAMILY MEDICINE

## 2019-11-04 PROCEDURE — 1036F TOBACCO NON-USER: CPT | Performed by: FAMILY MEDICINE

## 2019-11-04 PROCEDURE — G8427 DOCREV CUR MEDS BY ELIG CLIN: HCPCS | Performed by: FAMILY MEDICINE

## 2019-11-04 PROCEDURE — 4040F PNEUMOC VAC/ADMIN/RCVD: CPT | Performed by: FAMILY MEDICINE

## 2019-11-04 PROCEDURE — 1123F ACP DISCUSS/DSCN MKR DOCD: CPT | Performed by: FAMILY MEDICINE

## 2019-11-04 RX ORDER — LOSARTAN POTASSIUM AND HYDROCHLOROTHIAZIDE 12.5; 5 MG/1; MG/1
1 TABLET ORAL DAILY
Qty: 90 TABLET | Refills: 3 | Status: SHIPPED | OUTPATIENT
Start: 2019-11-04 | End: 2019-11-05

## 2019-11-04 RX ORDER — LEVOTHYROXINE SODIUM 0.1 MG/1
TABLET ORAL
Qty: 90 TABLET | Refills: 1 | Status: SHIPPED | OUTPATIENT
Start: 2019-11-04 | End: 2020-05-18

## 2019-11-04 ASSESSMENT — ENCOUNTER SYMPTOMS
COUGH: 1
CONSTIPATION: 0
DIARRHEA: 0
NAUSEA: 0
SHORTNESS OF BREATH: 1
RHINORRHEA: 1

## 2019-11-04 ASSESSMENT — COPD QUESTIONNAIRES: COPD: 1

## 2019-11-05 RX ORDER — VALSARTAN AND HYDROCHLOROTHIAZIDE 80; 12.5 MG/1; MG/1
1 TABLET, FILM COATED ORAL DAILY
Qty: 90 TABLET | Refills: 3 | Status: SHIPPED | OUTPATIENT
Start: 2019-11-05 | End: 2020-07-09 | Stop reason: SDUPTHER

## 2019-12-09 ENCOUNTER — OFFICE VISIT (OUTPATIENT)
Dept: CARDIOLOGY CLINIC | Age: 79
End: 2019-12-09
Payer: MEDICARE

## 2019-12-09 VITALS
DIASTOLIC BLOOD PRESSURE: 80 MMHG | BODY MASS INDEX: 35.87 KG/M2 | SYSTOLIC BLOOD PRESSURE: 130 MMHG | HEIGHT: 61 IN | WEIGHT: 190 LBS | OXYGEN SATURATION: 96 % | HEART RATE: 98 BPM

## 2019-12-09 DIAGNOSIS — E78.2 MIXED HYPERLIPIDEMIA: ICD-10-CM

## 2019-12-09 DIAGNOSIS — I10 ESSENTIAL HYPERTENSION: Primary | ICD-10-CM

## 2019-12-09 DIAGNOSIS — R06.02 SHORTNESS OF BREATH: ICD-10-CM

## 2019-12-09 DIAGNOSIS — R07.9 CHEST PAIN, UNSPECIFIED TYPE: ICD-10-CM

## 2019-12-09 PROCEDURE — 93000 ELECTROCARDIOGRAM COMPLETE: CPT | Performed by: INTERNAL MEDICINE

## 2019-12-09 PROCEDURE — G8417 CALC BMI ABV UP PARAM F/U: HCPCS | Performed by: INTERNAL MEDICINE

## 2019-12-09 PROCEDURE — 1036F TOBACCO NON-USER: CPT | Performed by: INTERNAL MEDICINE

## 2019-12-09 PROCEDURE — 1123F ACP DISCUSS/DSCN MKR DOCD: CPT | Performed by: INTERNAL MEDICINE

## 2019-12-09 PROCEDURE — G8482 FLU IMMUNIZE ORDER/ADMIN: HCPCS | Performed by: INTERNAL MEDICINE

## 2019-12-09 PROCEDURE — G8399 PT W/DXA RESULTS DOCUMENT: HCPCS | Performed by: INTERNAL MEDICINE

## 2019-12-09 PROCEDURE — 99204 OFFICE O/P NEW MOD 45 MIN: CPT | Performed by: INTERNAL MEDICINE

## 2019-12-09 PROCEDURE — 1090F PRES/ABSN URINE INCON ASSESS: CPT | Performed by: INTERNAL MEDICINE

## 2019-12-09 PROCEDURE — 4040F PNEUMOC VAC/ADMIN/RCVD: CPT | Performed by: INTERNAL MEDICINE

## 2019-12-09 PROCEDURE — G8427 DOCREV CUR MEDS BY ELIG CLIN: HCPCS | Performed by: INTERNAL MEDICINE

## 2020-01-09 ENCOUNTER — HOSPITAL ENCOUNTER (OUTPATIENT)
Dept: NON INVASIVE DIAGNOSTICS | Age: 80
Discharge: HOME | End: 2020-01-09
Payer: MEDICARE

## 2020-01-09 ENCOUNTER — TELEPHONE (OUTPATIENT)
Dept: CARDIOLOGY CLINIC | Age: 80
End: 2020-01-09

## 2020-01-09 LAB
LV EF: 55 %
LVEF MODALITY: NORMAL

## 2020-01-09 PROCEDURE — 93306 TTE W/DOPPLER COMPLETE: CPT

## 2020-01-10 ENCOUNTER — OFFICE VISIT (OUTPATIENT)
Dept: CARDIOLOGY CLINIC | Age: 80
End: 2020-01-10
Payer: MEDICARE

## 2020-01-10 ENCOUNTER — HOSPITAL ENCOUNTER (OUTPATIENT)
Dept: NON INVASIVE DIAGNOSTICS | Age: 80
Discharge: HOME | End: 2020-01-10
Payer: MEDICARE

## 2020-01-10 VITALS
SYSTOLIC BLOOD PRESSURE: 120 MMHG | BODY MASS INDEX: 36.25 KG/M2 | HEIGHT: 61 IN | WEIGHT: 192 LBS | OXYGEN SATURATION: 94 % | HEART RATE: 91 BPM | DIASTOLIC BLOOD PRESSURE: 60 MMHG

## 2020-01-10 LAB
LV EF: 63 %
LVEF MODALITY: NORMAL

## 2020-01-10 PROCEDURE — G8427 DOCREV CUR MEDS BY ELIG CLIN: HCPCS | Performed by: INTERNAL MEDICINE

## 2020-01-10 PROCEDURE — 3430000000 HC RX DIAGNOSTIC RADIOPHARMACEUTICAL: Performed by: INTERNAL MEDICINE

## 2020-01-10 PROCEDURE — 1036F TOBACCO NON-USER: CPT | Performed by: INTERNAL MEDICINE

## 2020-01-10 PROCEDURE — 4040F PNEUMOC VAC/ADMIN/RCVD: CPT | Performed by: INTERNAL MEDICINE

## 2020-01-10 PROCEDURE — G8482 FLU IMMUNIZE ORDER/ADMIN: HCPCS | Performed by: INTERNAL MEDICINE

## 2020-01-10 PROCEDURE — A9502 TC99M TETROFOSMIN: HCPCS | Performed by: INTERNAL MEDICINE

## 2020-01-10 PROCEDURE — 78452 HT MUSCLE IMAGE SPECT MULT: CPT

## 2020-01-10 PROCEDURE — 93017 CV STRESS TEST TRACING ONLY: CPT | Performed by: INTERNAL MEDICINE

## 2020-01-10 PROCEDURE — 1090F PRES/ABSN URINE INCON ASSESS: CPT | Performed by: INTERNAL MEDICINE

## 2020-01-10 PROCEDURE — 6360000002 HC RX W HCPCS: Performed by: INTERNAL MEDICINE

## 2020-01-10 PROCEDURE — 99213 OFFICE O/P EST LOW 20 MIN: CPT | Performed by: INTERNAL MEDICINE

## 2020-01-10 PROCEDURE — 1123F ACP DISCUSS/DSCN MKR DOCD: CPT | Performed by: INTERNAL MEDICINE

## 2020-01-10 PROCEDURE — G8417 CALC BMI ABV UP PARAM F/U: HCPCS | Performed by: INTERNAL MEDICINE

## 2020-01-10 PROCEDURE — G8399 PT W/DXA RESULTS DOCUMENT: HCPCS | Performed by: INTERNAL MEDICINE

## 2020-01-10 RX ADMIN — REGADENOSON 0.4 MG: 0.08 INJECTION, SOLUTION INTRAVENOUS at 08:26

## 2020-01-10 RX ADMIN — TETROFOSMIN 30 MILLICURIE: 1.38 INJECTION, POWDER, LYOPHILIZED, FOR SOLUTION INTRAVENOUS at 08:28

## 2020-01-10 RX ADMIN — TETROFOSMIN 10 MILLICURIE: 1.38 INJECTION, POWDER, LYOPHILIZED, FOR SOLUTION INTRAVENOUS at 07:23

## 2020-01-10 NOTE — PROGRESS NOTES
Instructed on Lexiscan Stress Test Procedure including possible side effects/ adverse reactions. Patient verbalizes  understanding and denies having any questions . See [address] [place] [address] Cardiology

## 2020-04-22 ENCOUNTER — TELEPHONE (OUTPATIENT)
Dept: FAMILY MEDICINE CLINIC | Age: 80
End: 2020-04-22

## 2020-04-22 NOTE — TELEPHONE ENCOUNTER
PT returned call about changing appointment to VV. No smart phone, no computer. Unable to do VV.     Please call back to advise

## 2020-05-18 NOTE — TELEPHONE ENCOUNTER
Medication:   Requested Prescriptions     Pending Prescriptions Disp Refills    levothyroxine (SYNTHROID) 100 MCG tablet [Pharmacy Med Name: LEVOTHYROXINE 0.100MG (100MCG) TAB] 90 tablet 1     Sig: TAKE 1 TABLET BY MOUTH EVERY DAY     Last Filled:  11/4/19    Patient Phone Number: [phone] (home)     Last appt: 11/4/2019   Next appt: 6/29/2020    Last Thyroid:   Lab Results   Component Value Date    TSH 2.810 01/18/2017    T4FREE 1.29 01/18/2017       Last OARRS:   RX Monitoring 12/21/2016   Attestation The Prescription Monitoring Report for this patient was reviewed today. Periodic Controlled Substance Monitoring Possible medication side effects, risk of tolerance and/or dependence, and alternative treatments discussed; No signs of potential drug abuse or diversion identified.        [facility] [address] [phone] (Phone)  [phone] (Fax)

## 2020-05-19 RX ORDER — LEVOTHYROXINE SODIUM 0.1 MG/1
TABLET ORAL
Qty: 90 TABLET | Refills: 0 | Status: SHIPPED | OUTPATIENT
Start: 2020-05-19 | End: 2020-07-09 | Stop reason: SDUPTHER

## 2020-06-29 ENCOUNTER — TELEPHONE (OUTPATIENT)
Dept: FAMILY MEDICINE CLINIC | Age: 80
End: 2020-06-29

## 2020-06-29 NOTE — TELEPHONE ENCOUNTER
[facility] received a call from:    Name of Caller: self     Relationship to patient: self    Organization name: N/A    Best contact number: [phone]    Reason for call: Pt called to R/S IN OFFICE appt- Please call to R/S

## 2020-07-09 ENCOUNTER — OFFICE VISIT (OUTPATIENT)
Dept: FAMILY MEDICINE CLINIC | Age: 80
End: 2020-07-09
Payer: MEDICARE

## 2020-07-09 VITALS
WEIGHT: 186 LBS | SYSTOLIC BLOOD PRESSURE: 120 MMHG | BODY MASS INDEX: 35.14 KG/M2 | DIASTOLIC BLOOD PRESSURE: 70 MMHG | TEMPERATURE: 99.7 F

## 2020-07-09 PROCEDURE — 4040F PNEUMOC VAC/ADMIN/RCVD: CPT | Performed by: FAMILY MEDICINE

## 2020-07-09 PROCEDURE — 99214 OFFICE O/P EST MOD 30 MIN: CPT | Performed by: FAMILY MEDICINE

## 2020-07-09 PROCEDURE — G8417 CALC BMI ABV UP PARAM F/U: HCPCS | Performed by: FAMILY MEDICINE

## 2020-07-09 PROCEDURE — G8427 DOCREV CUR MEDS BY ELIG CLIN: HCPCS | Performed by: FAMILY MEDICINE

## 2020-07-09 PROCEDURE — 1036F TOBACCO NON-USER: CPT | Performed by: FAMILY MEDICINE

## 2020-07-09 PROCEDURE — G8399 PT W/DXA RESULTS DOCUMENT: HCPCS | Performed by: FAMILY MEDICINE

## 2020-07-09 PROCEDURE — 1090F PRES/ABSN URINE INCON ASSESS: CPT | Performed by: FAMILY MEDICINE

## 2020-07-09 PROCEDURE — 1123F ACP DISCUSS/DSCN MKR DOCD: CPT | Performed by: FAMILY MEDICINE

## 2020-07-09 RX ORDER — LEVOTHYROXINE SODIUM 0.1 MG/1
TABLET ORAL
Qty: 90 TABLET | Refills: 1 | Status: SHIPPED | OUTPATIENT
Start: 2020-07-09 | End: 2021-03-15

## 2020-07-09 RX ORDER — VALSARTAN AND HYDROCHLOROTHIAZIDE 80; 12.5 MG/1; MG/1
1 TABLET, FILM COATED ORAL DAILY
Qty: 90 TABLET | Refills: 3 | Status: SHIPPED | OUTPATIENT
Start: 2020-07-09 | End: 2021-09-13

## 2020-07-09 ASSESSMENT — ENCOUNTER SYMPTOMS
RHINORRHEA: 1
COUGH: 1
DIARRHEA: 0
SINUS PAIN: 1
CHEST TIGHTNESS: 1
SHORTNESS OF BREATH: 1
BACK PAIN: 1
CONSTIPATION: 0

## 2020-07-09 ASSESSMENT — COPD QUESTIONNAIRES: COPD: 1

## 2020-07-09 NOTE — PROGRESS NOTES
SUBJECTIVE:    Chavo Schmidt is a 68 y.o. female who presents for a follow up visit. Chief Complaint   Patient presents with    Follow-up     Patient is here for a follow up. No new concerns. Discuss lab        COPD   She complains of chest tightness, cough ( occ'l) and shortness of breath. This is a chronic problem. The current episode started more than 1 year ago. The problem has been waxing and waning. Associated symptoms include chest pain, dyspnea on exertion, malaise/fatigue, rhinorrhea and sneezing. Her symptoms are aggravated by exercise, change in weather, strenuous activity and URI. Her symptoms are alleviated by beta-agonist and steroid inhaler. She reports moderate improvement on treatment. Her past medical history is significant for asthma and COPD. Hypertension   This is a chronic problem. The current episode started more than 1 year ago. The problem is controlled. Associated symptoms include chest pain, malaise/fatigue and shortness of breath. Risk factors for coronary artery disease include sedentary lifestyle, post-menopausal state and obesity. Past treatments include angiotensin blockers and diuretics. The current treatment provides significant improvement. Compliance problems include exercise and diet. Hypothyroidism  This is a chronic problem. She has had some fatigue. No recent change in bowel habits. She has not had a TSH since October of last year. At that time it was in the normal therapeutic range. Her current dose of levothyroxine is 100 mcg daily    Patient's medications, allergies, past medical,surgical, social and family histories were reviewed and updated as appropriate.      Past Medical History:   Diagnosis Date    Arthritis     Asthma     COPD (chronic obstructive pulmonary disease) ([facility] [address]     Diabetes mellitus ([facility]     Hyperlipidemia     Hypertension     Influenza 02/08/2018    Thyroid disease      Past Surgical History:   Procedure Laterality Date    COLONOSCOPY       Family History   Problem Relation Age of Onset    Heart Disease Mother     High Blood Pressure Mother     Stroke Mother     Heart Disease Sister     High Blood Pressure Sister     Cancer Brother         lung     Social History     Tobacco Use    Smoking status: Former Smoker     Packs/day: 1.00     Years: 10.00     Pack years: 10.00     Types: Cigarettes     Last attempt to quit: 10/5/1998     Years since quittin.7    Smokeless tobacco: Never Used    Tobacco comment: was only smoking socially on and off   Substance Use Topics    Alcohol use: No     Alcohol/week: 0.0 standard drinks      Allergies   Allergen Reactions    Aspirin      Recurrent GI blood Loss?  Sulfa Antibiotics      Other reaction(s): Other (See Comments)  hallucinations  hallucinations    Ace Inhibitors Palpitations     Swelling and cough  cough     Current Outpatient Medications on File Prior to Visit   Medication Sig Dispense Refill    ipratropium-albuterol (DUONEB) 0.5-2.5 (3) MG/3ML SOLN nebulizer solution USE 1 VIAL VIA NEBULIZER EVERY 6 HOURS 360 vial 3    fluticasone (FLONASE) 50 MCG/ACT nasal spray 2 sprays by Nasal route daily as needed (as needed) 1 Bottle 2    albuterol sulfate  (90 Base) MCG/ACT inhaler INHALE 2 PUFFS INTO LUNGS EVERY 6 HOURS AS NEEDED FOR SHORTNESS OF BREATH OR WHEEZING 8.5 g 5    tiotropium (SPIRIVA HANDIHALER) 18 MCG inhalation capsule Inhale 1 capsule into the lungs daily 30 capsule 6    cetirizine (ZYRTEC) 10 MG tablet Take 10 mg by mouth daily      fluticasone-salmeterol (ADVAIR) 250-50 MCG/DOSE AEPB Inhale 1 puff into the lungs every 12 hours 1 each 6    acetaminophen (TYLENOL) 500 MG tablet Take 1,000 mg by mouth daily as needed        No current facility-administered medications on file prior to visit. Review of Systems   Constitutional: Positive for malaise/fatigue. Negative for activity change, fatigue and unexpected weight change.    HENT: Positive for rhinorrhea, sinus pain and sneezing. Negative for congestion. Respiratory: Positive for cough ( occ'l) and shortness of breath. Cardiovascular: Positive for chest pain and dyspnea on exertion. Gastrointestinal: Negative for constipation and diarrhea. Genitourinary: Negative for difficulty urinating. Musculoskeletal: Positive for arthralgias ( hands and knees) and back pain. Neurological: Negative for dizziness. Psychiatric/Behavioral: Positive for sleep disturbance (MNA). Negative for dysphoric mood. The patient is not nervous/anxious. OBJECTIVE:    /70   Temp 99.7 °F (37.6 °C)   Wt 186 lb (84.4 kg)   BMI 35.14 kg/m²    Physical Exam  Constitutional:       Appearance: Normal appearance. HENT:      Head: Normocephalic and atraumatic. Right Ear: Tympanic membrane normal.      Left Ear: Tympanic membrane normal.      Nose: Nose normal.   Cardiovascular:      Rate and Rhythm: Normal rate and regular rhythm. Heart sounds: Normal heart sounds. Pulmonary:      Effort: Pulmonary effort is normal.      Breath sounds: Normal breath sounds. Musculoskeletal:      Right lower leg: No edema. Left lower leg: No edema. Neurological:      General: No focal deficit present. Mental Status: She is alert and oriented to person, place, and time. Psychiatric:         Mood and Affect: Mood normal.         Behavior: Behavior normal.         ASSESSMENT/PLAN:    Justin Hurst was seen today for follow-up. Diagnoses and all orders for this visit:    Essential hypertension  -     valsartan-hydrochlorothiazide (DIOVAN-HCT) 80-12.5 MG per tablet; Take 1 tablet by mouth daily    Acquired hypothyroidism  -     levothyroxine (SYNTHROID) 100 MCG tablet; One daily              TSH prior to next appointment in January of next year    Pure hypercholesterolemia  -     Comprehensive Metabolic Panel, Fasting; Future  -     Lipid, Fasting; Future  -     TSH with Reflex;  Future    Generalized osteoarthrosis, involving multiple sites  She is encouraged to use Tylenol as needed. Hyperglycemia  -     Hemoglobin A1C; Future        Return in about 6 months (around 1/9/2021). Please note portions of this note were completed with a voicerecognition program.  Efforts were made to edit the dictations but occasionally words are mis-transcribed.

## 2020-09-28 ENCOUNTER — APPOINTMENT (OUTPATIENT)
Dept: GENERAL RADIOLOGY | Age: 80
End: 2020-09-28
Payer: MEDICARE

## 2020-09-28 ENCOUNTER — HOSPITAL ENCOUNTER (OUTPATIENT)
Age: 80
Setting detail: OBSERVATION
Discharge: HOME | End: 2020-09-29
Attending: EMERGENCY MEDICINE | Admitting: INTERNAL MEDICINE
Payer: MEDICARE

## 2020-09-28 LAB
ANION GAP SERPL CALCULATED.3IONS-SCNC: 15 MMOL/L (ref 3–16)
BASOPHILS ABSOLUTE: 0 K/UL (ref 0–0.2)
BASOPHILS RELATIVE PERCENT: 0.2 %
BUN BLDV-MCNC: 14 MG/DL (ref 7–20)
CALCIUM SERPL-MCNC: 9.4 MG/DL (ref 8.3–10.6)
CHLORIDE BLD-SCNC: 100 MMOL/L (ref 99–110)
CO2: 23 MMOL/L (ref 21–32)
CREAT SERPL-MCNC: 0.7 MG/DL (ref 0.6–1.2)
EOSINOPHILS ABSOLUTE: 0.1 K/UL (ref 0–0.6)
EOSINOPHILS RELATIVE PERCENT: 0.8 %
GFR AFRICAN AMERICAN: >60
GFR NON-AFRICAN AMERICAN: >60
GLUCOSE BLD-MCNC: 198 MG/DL (ref 70–99)
HCT VFR BLD CALC: 44.7 % (ref 36–48)
HEMOGLOBIN: 14.8 G/DL (ref 12–16)
INR BLD: 1 (ref 0.86–1.14)
LYMPHOCYTES ABSOLUTE: 1.5 K/UL (ref 1–5.1)
LYMPHOCYTES RELATIVE PERCENT: 19.1 %
MCH RBC QN AUTO: 28.9 PG (ref 26–34)
MCHC RBC AUTO-ENTMCNC: 33.1 G/DL (ref 31–36)
MCV RBC AUTO: 87.5 FL (ref 80–100)
MONOCYTES ABSOLUTE: 0.7 K/UL (ref 0–1.3)
MONOCYTES RELATIVE PERCENT: 8.3 %
NEUTROPHILS ABSOLUTE: 5.8 K/UL (ref 1.7–7.7)
NEUTROPHILS RELATIVE PERCENT: 71.6 %
PDW BLD-RTO: 13.8 % (ref 12.4–15.4)
PLATELET # BLD: 263 K/UL (ref 135–450)
PMV BLD AUTO: 8.8 FL (ref 5–10.5)
POTASSIUM SERPL-SCNC: 3.7 MMOL/L (ref 3.5–5.1)
PROTHROMBIN TIME: 11.6 SEC (ref 10–13.2)
RBC # BLD: 5.11 M/UL (ref 4–5.2)
SODIUM BLD-SCNC: 138 MMOL/L (ref 136–145)
TROPONIN: <0.01 NG/ML
WBC # BLD: 8 K/UL (ref 4–11)

## 2020-09-28 PROCEDURE — 84484 ASSAY OF TROPONIN QUANT: CPT

## 2020-09-28 PROCEDURE — 85025 COMPLETE CBC W/AUTO DIFF WBC: CPT

## 2020-09-28 PROCEDURE — 80048 BASIC METABOLIC PNL TOTAL CA: CPT

## 2020-09-28 PROCEDURE — 85610 PROTHROMBIN TIME: CPT

## 2020-09-28 PROCEDURE — 99285 EMERGENCY DEPT VISIT HI MDM: CPT

## 2020-09-28 PROCEDURE — 71046 X-RAY EXAM CHEST 2 VIEWS: CPT

## 2020-09-28 PROCEDURE — 6370000000 HC RX 637 (ALT 250 FOR IP): Performed by: NURSE PRACTITIONER

## 2020-09-28 PROCEDURE — 99291 CRITICAL CARE FIRST HOUR: CPT

## 2020-09-28 PROCEDURE — 83880 ASSAY OF NATRIURETIC PEPTIDE: CPT

## 2020-09-28 PROCEDURE — 83690 ASSAY OF LIPASE: CPT

## 2020-09-28 PROCEDURE — 93005 ELECTROCARDIOGRAM TRACING: CPT | Performed by: EMERGENCY MEDICINE

## 2020-09-28 RX ORDER — ASPIRIN 81 MG/1
324 TABLET, CHEWABLE ORAL ONCE
Status: COMPLETED | OUTPATIENT
Start: 2020-09-28 | End: 2020-09-28

## 2020-09-28 RX ORDER — NITROGLYCERIN 0.4 MG/1
0.4 TABLET SUBLINGUAL ONCE
Status: COMPLETED | OUTPATIENT
Start: 2020-09-28 | End: 2020-09-28

## 2020-09-28 RX ADMIN — NITROGLYCERIN 0.4 MG: 0.4 TABLET SUBLINGUAL at 22:25

## 2020-09-28 RX ADMIN — ASPIRIN 324 MG: 81 TABLET, CHEWABLE ORAL at 22:25

## 2020-09-28 ASSESSMENT — ENCOUNTER SYMPTOMS
SHORTNESS OF BREATH: 0
DIARRHEA: 0
VOMITING: 1
NAUSEA: 1
ABDOMINAL PAIN: 0
CHEST TIGHTNESS: 0

## 2020-09-28 ASSESSMENT — PAIN SCALES - GENERAL: PAINLEVEL_OUTOF10: 7

## 2020-09-29 VITALS
TEMPERATURE: 97.5 F | HEART RATE: 90 BPM | SYSTOLIC BLOOD PRESSURE: 104 MMHG | BODY MASS INDEX: 36.06 KG/M2 | OXYGEN SATURATION: 91 % | WEIGHT: 191 LBS | HEIGHT: 61 IN | RESPIRATION RATE: 20 BRPM | DIASTOLIC BLOOD PRESSURE: 67 MMHG

## 2020-09-29 PROBLEM — I20.0 UNSTABLE ANGINA (HCC): Status: ACTIVE | Noted: 2020-09-29

## 2020-09-29 PROBLEM — R07.9 CHEST PAIN, RULE OUT ACUTE MYOCARDIAL INFARCTION: Status: ACTIVE | Noted: 2020-09-29

## 2020-09-29 LAB
CHOLESTEROL, TOTAL: 173 MG/DL (ref 0–199)
EKG ATRIAL RATE: 93 BPM
EKG DIAGNOSIS: NORMAL
EKG P AXIS: 62 DEGREES
EKG P-R INTERVAL: 162 MS
EKG Q-T INTERVAL: 352 MS
EKG QRS DURATION: 88 MS
EKG QTC CALCULATION (BAZETT): 437 MS
EKG R AXIS: 1 DEGREES
EKG T AXIS: 38 DEGREES
EKG VENTRICULAR RATE: 93 BPM
ESTIMATED AVERAGE GLUCOSE: 122.6 MG/DL
GLUCOSE BLD-MCNC: 114 MG/DL (ref 70–99)
GLUCOSE BLD-MCNC: 94 MG/DL (ref 70–99)
GLUCOSE BLD-MCNC: 98 MG/DL (ref 70–99)
GLUCOSE BLD-MCNC: 99 MG/DL (ref 70–99)
HBA1C MFR BLD: 5.9 %
HDLC SERPL-MCNC: 40 MG/DL (ref 40–60)
LDL CHOLESTEROL CALCULATED: 119 MG/DL
LEFT VENTRICULAR EJECTION FRACTION MODE: NORMAL
LIPASE: 28 U/L (ref 13–60)
LV EF: 60 %
PERFORMED ON: ABNORMAL
PERFORMED ON: NORMAL
PRO-BNP: 176 PG/ML (ref 0–449)
TRIGL SERPL-MCNC: 71 MG/DL (ref 0–150)
TROPONIN: <0.01 NG/ML
TROPONIN: <0.01 NG/ML
VLDLC SERPL CALC-MCNC: 14 MG/DL

## 2020-09-29 PROCEDURE — 94640 AIRWAY INHALATION TREATMENT: CPT

## 2020-09-29 PROCEDURE — 93458 L HRT ARTERY/VENTRICLE ANGIO: CPT | Performed by: INTERNAL MEDICINE

## 2020-09-29 PROCEDURE — 93010 ELECTROCARDIOGRAM REPORT: CPT | Performed by: INTERNAL MEDICINE

## 2020-09-29 PROCEDURE — 84484 ASSAY OF TROPONIN QUANT: CPT

## 2020-09-29 PROCEDURE — 6370000000 HC RX 637 (ALT 250 FOR IP): Performed by: NURSE PRACTITIONER

## 2020-09-29 PROCEDURE — 94760 N-INVAS EAR/PLS OXIMETRY 1: CPT

## 2020-09-29 PROCEDURE — 6360000002 HC RX W HCPCS

## 2020-09-29 PROCEDURE — 99153 MOD SED SAME PHYS/QHP EA: CPT

## 2020-09-29 PROCEDURE — G0378 HOSPITAL OBSERVATION PER HR: HCPCS

## 2020-09-29 PROCEDURE — 99205 OFFICE O/P NEW HI 60 MIN: CPT | Performed by: INTERNAL MEDICINE

## 2020-09-29 PROCEDURE — C1894 INTRO/SHEATH, NON-LASER: HCPCS

## 2020-09-29 PROCEDURE — 6360000004 HC RX CONTRAST MEDICATION: Performed by: INTERNAL MEDICINE

## 2020-09-29 PROCEDURE — 99152 MOD SED SAME PHYS/QHP 5/>YRS: CPT

## 2020-09-29 PROCEDURE — C1769 GUIDE WIRE: HCPCS

## 2020-09-29 PROCEDURE — 2500000003 HC RX 250 WO HCPCS

## 2020-09-29 PROCEDURE — 36415 COLL VENOUS BLD VENIPUNCTURE: CPT

## 2020-09-29 PROCEDURE — 83036 HEMOGLOBIN GLYCOSYLATED A1C: CPT

## 2020-09-29 PROCEDURE — 2580000003 HC RX 258: Performed by: NURSE PRACTITIONER

## 2020-09-29 PROCEDURE — 80061 LIPID PANEL: CPT

## 2020-09-29 PROCEDURE — 93458 L HRT ARTERY/VENTRICLE ANGIO: CPT

## 2020-09-29 PROCEDURE — 2709999900 HC NON-CHARGEABLE SUPPLY

## 2020-09-29 RX ORDER — POLYETHYLENE GLYCOL 3350 17 G/17G
17 POWDER, FOR SOLUTION ORAL DAILY PRN
Status: DISCONTINUED | OUTPATIENT
Start: 2020-09-29 | End: 2020-09-29 | Stop reason: HOSPADM

## 2020-09-29 RX ORDER — ACETAMINOPHEN 325 MG/1
650 TABLET ORAL EVERY 4 HOURS PRN
Status: DISCONTINUED | OUTPATIENT
Start: 2020-09-29 | End: 2020-09-29 | Stop reason: HOSPADM

## 2020-09-29 RX ORDER — ACETAMINOPHEN 325 MG/1
650 TABLET ORAL EVERY 6 HOURS PRN
Status: DISCONTINUED | OUTPATIENT
Start: 2020-09-29 | End: 2020-09-29 | Stop reason: SDUPTHER

## 2020-09-29 RX ORDER — SODIUM CHLORIDE 0.9 % (FLUSH) 0.9 %
10 SYRINGE (ML) INJECTION EVERY 12 HOURS SCHEDULED
Status: DISCONTINUED | OUTPATIENT
Start: 2020-09-29 | End: 2020-09-29 | Stop reason: SDUPTHER

## 2020-09-29 RX ORDER — DEXTROSE MONOHYDRATE 25 G/50ML
12.5 INJECTION, SOLUTION INTRAVENOUS PRN
Status: DISCONTINUED | OUTPATIENT
Start: 2020-09-29 | End: 2020-09-29 | Stop reason: HOSPADM

## 2020-09-29 RX ORDER — INSULIN LISPRO 100 [IU]/ML
0-3 INJECTION, SOLUTION INTRAVENOUS; SUBCUTANEOUS NIGHTLY
Status: DISCONTINUED | OUTPATIENT
Start: 2020-09-29 | End: 2020-09-29 | Stop reason: HOSPADM

## 2020-09-29 RX ORDER — NICOTINE POLACRILEX 4 MG
15 LOZENGE BUCCAL PRN
Status: DISCONTINUED | OUTPATIENT
Start: 2020-09-29 | End: 2020-09-29 | Stop reason: HOSPADM

## 2020-09-29 RX ORDER — HYDROCHLOROTHIAZIDE 25 MG/1
12.5 TABLET ORAL DAILY
Status: DISCONTINUED | OUTPATIENT
Start: 2020-09-29 | End: 2020-09-29 | Stop reason: HOSPADM

## 2020-09-29 RX ORDER — NITROGLYCERIN 0.4 MG/1
0.4 TABLET SUBLINGUAL EVERY 5 MIN PRN
Status: DISCONTINUED | OUTPATIENT
Start: 2020-09-29 | End: 2020-09-29 | Stop reason: HOSPADM

## 2020-09-29 RX ORDER — SODIUM CHLORIDE 0.9 % (FLUSH) 0.9 %
10 SYRINGE (ML) INJECTION PRN
Status: DISCONTINUED | OUTPATIENT
Start: 2020-09-29 | End: 2020-09-29 | Stop reason: HOSPADM

## 2020-09-29 RX ORDER — SODIUM CHLORIDE 0.9 % (FLUSH) 0.9 %
10 SYRINGE (ML) INJECTION EVERY 12 HOURS SCHEDULED
Status: DISCONTINUED | OUTPATIENT
Start: 2020-09-29 | End: 2020-09-29 | Stop reason: HOSPADM

## 2020-09-29 RX ORDER — ONDANSETRON 2 MG/ML
4 INJECTION INTRAMUSCULAR; INTRAVENOUS EVERY 6 HOURS PRN
Status: DISCONTINUED | OUTPATIENT
Start: 2020-09-29 | End: 2020-09-29 | Stop reason: HOSPADM

## 2020-09-29 RX ORDER — SODIUM CHLORIDE 9 MG/ML
INJECTION, SOLUTION INTRAVENOUS CONTINUOUS
Status: ACTIVE | OUTPATIENT
Start: 2020-09-29 | End: 2020-09-29

## 2020-09-29 RX ORDER — VALSARTAN 80 MG/1
80 TABLET ORAL DAILY
Status: DISCONTINUED | OUTPATIENT
Start: 2020-09-29 | End: 2020-09-29 | Stop reason: HOSPADM

## 2020-09-29 RX ORDER — CETIRIZINE HYDROCHLORIDE 10 MG/1
10 TABLET ORAL DAILY
Status: DISCONTINUED | OUTPATIENT
Start: 2020-09-29 | End: 2020-09-29 | Stop reason: HOSPADM

## 2020-09-29 RX ORDER — PROMETHAZINE HYDROCHLORIDE 25 MG/1
12.5 TABLET ORAL EVERY 6 HOURS PRN
Status: DISCONTINUED | OUTPATIENT
Start: 2020-09-29 | End: 2020-09-29 | Stop reason: HOSPADM

## 2020-09-29 RX ORDER — LEVOTHYROXINE SODIUM 0.1 MG/1
100 TABLET ORAL DAILY
Status: DISCONTINUED | OUTPATIENT
Start: 2020-09-29 | End: 2020-09-29 | Stop reason: HOSPADM

## 2020-09-29 RX ORDER — DEXTROSE MONOHYDRATE 50 MG/ML
100 INJECTION, SOLUTION INTRAVENOUS PRN
Status: DISCONTINUED | OUTPATIENT
Start: 2020-09-29 | End: 2020-09-29 | Stop reason: HOSPADM

## 2020-09-29 RX ORDER — SODIUM CHLORIDE 0.9 % (FLUSH) 0.9 %
10 SYRINGE (ML) INJECTION PRN
Status: DISCONTINUED | OUTPATIENT
Start: 2020-09-29 | End: 2020-09-29 | Stop reason: SDUPTHER

## 2020-09-29 RX ORDER — INSULIN LISPRO 100 [IU]/ML
0-6 INJECTION, SOLUTION INTRAVENOUS; SUBCUTANEOUS
Status: DISCONTINUED | OUTPATIENT
Start: 2020-09-29 | End: 2020-09-29 | Stop reason: HOSPADM

## 2020-09-29 RX ORDER — ACETAMINOPHEN 650 MG/1
650 SUPPOSITORY RECTAL EVERY 6 HOURS PRN
Status: DISCONTINUED | OUTPATIENT
Start: 2020-09-29 | End: 2020-09-29 | Stop reason: SDUPTHER

## 2020-09-29 RX ORDER — VALSARTAN AND HYDROCHLOROTHIAZIDE 80; 12.5 MG/1; MG/1
1 TABLET, FILM COATED ORAL DAILY
Status: DISCONTINUED | OUTPATIENT
Start: 2020-09-29 | End: 2020-09-29 | Stop reason: CLARIF

## 2020-09-29 RX ADMIN — ACETAMINOPHEN 650 MG: 325 TABLET ORAL at 08:48

## 2020-09-29 RX ADMIN — IOPAMIDOL 66 ML: 755 INJECTION, SOLUTION INTRAVENOUS at 15:07

## 2020-09-29 RX ADMIN — SODIUM CHLORIDE: 9 INJECTION, SOLUTION INTRAVENOUS at 02:12

## 2020-09-29 RX ADMIN — TIOTROPIUM BROMIDE INHALATION SPRAY 2 PUFF: 3.12 SPRAY, METERED RESPIRATORY (INHALATION) at 08:03

## 2020-09-29 RX ADMIN — LEVOTHYROXINE SODIUM 100 MCG: 0.1 TABLET ORAL at 06:16

## 2020-09-29 RX ADMIN — Medication 10 ML: at 08:49

## 2020-09-29 RX ADMIN — CETIRIZINE HYDROCHLORIDE 10 MG: 10 TABLET, FILM COATED ORAL at 08:48

## 2020-09-29 ASSESSMENT — PAIN SCALES - GENERAL
PAINLEVEL_OUTOF10: 0
PAINLEVEL_OUTOF10: 0
PAINLEVEL_OUTOF10: 3
PAINLEVEL_OUTOF10: 1

## 2020-09-29 ASSESSMENT — ENCOUNTER SYMPTOMS
NAUSEA: 1
RESPIRATORY NEGATIVE: 1
DIARRHEA: 0
VOMITING: 0
EYES NEGATIVE: 1
BACK PAIN: 1
CONSTIPATION: 0

## 2020-09-29 ASSESSMENT — PAIN DESCRIPTION - ORIENTATION: ORIENTATION: ANTERIOR

## 2020-09-29 ASSESSMENT — PAIN DESCRIPTION - LOCATION
LOCATION: HEAD
LOCATION: HEAD

## 2020-09-29 NOTE — PRE SEDATION
Brief Pre-Op Note/Sedation Assessment      Birgit Fish  1940  [id number]      [id number]  10:23 AM    Planned Procedure: Cardiac Catheterization Procedure    Post Procedure Plan: Return to same level of care    Consent: I have discussed with the patient and/or the patient representative the indication, alternatives, and the possible risks and/or complications of the planned procedure and the anesthesia methods. The patient and/or patient representative appear to understand and agree to proceed. Chief Complaint: Chest Pain/Pressure  Anginal Equivalent      Indications for Cath Procedure:  New Onset Angina <= 2 months  Anginal Classification within 2 weeks:  CCS IV - Inability to perform any activity without angina or angina at rest, i.e., severe limitation  NYHA Heart Failure Class within 2 weeks: No symptoms  Is Cath Lab Visit Valve-related?: No  Surgical Risk: Intermediate  Functional Type: Unknown    Anti- Anginal Meds within 2 weeks:   Yes: Aspirin    Stress or Imaging Studies Performed:  None     Vital Signs:  /76   Pulse 73   Temp 98.1 °F (36.7 °C) (Oral)   Resp 18   Ht 5' 1\" (1.549 m)   Wt 191 lb (86.6 kg)   SpO2 92%   BMI 36.09 kg/m²     Allergies: Allergies   Allergen Reactions    Aspirin      Recurrent GI blood Loss?  Sulfa Antibiotics      Other reaction(s):  Other (See Comments)  hallucinations  hallucinations    Ace Inhibitors Palpitations     Swelling and cough  cough       Past Medical History:  Past Medical History:   Diagnosis Date    Arthritis     Asthma     COPD (chronic obstructive pulmonary disease) ([facility] [address]     Diabetes mellitus ([facility]     Hyperlipidemia     Hypertension     Influenza 02/08/2018    Thyroid disease          Surgical History:  Past Surgical History:   Procedure Laterality Date    COLONOSCOPY           Medications:  Current Facility-Administered Medications   Medication Dose Route Frequency Provider Last Rate Last Dose    cetirizine PRN Parisa Espinosa MD        dextrose 50 % IV solution  12.5 g Intravenous PRN Parisa Espinosa MD        glucagon (rDNA) injection 1 mg  1 mg Intramuscular PRN Parisa Espinosa MD        dextrose 5 % solution  100 mL/hr Intravenous PRN Parisa Espinosa MD               Pre-Sedation:    Pre-Sedation Documentation and Exam:  I have personally completed a history, physical exam & review of systems for this patient (see notes). I have assessed the patient and agree with the H&P present on the chart. Prior History of Anesthesia Complications:   none    Modified Mallampati:  III (soft palate, base of uvula visible)    ASA Classification:  Class 2 - A normal healthy patient with mild systemic disease      Jeremy Scale: Activity:  2 - Able to move 4 extremities voluntarily on command  Respiration:  2 - Able to breathe deeply and cough freely  Circulation:  2 - BP+/- 20mmHg of normal  Consciousness:  2 - Fully awake  Oxygen Saturation (color):  2 - Able to maintain oxygen saturation >92% on room air    Sedation/Anesthesia Plan:  Guard the patient's safety and welfare. Minimize physical discomfort and pain. Minimize negative psychological responses to treatment by providing sedation and analgesia and maximize the potential amnesia. Patient to meet pre-procedure discharge plan.     Medication Planned:  midazolam intravenously and fentanyl intravenously    Patient is an appropriate candidate for plan of sedation: yes      Electronically signed by Iris Dooley MD on 9/29/2020 at 10:23 AM

## 2020-09-29 NOTE — CARE COORDINATION
Patient admitted as Observation with an anticipated short hospitalization length of stay. Chart reviewed and it appears that patient has minimal needs for discharge at this time. Discussed with patients nurse and requested that case management be notified if discharge needs are identified. Case management will continue to follow progress and update discharge plan as needed.

## 2020-09-29 NOTE — PROGRESS NOTES
Data- discharge order received, pt verbalized agreement to discharge, disposition to previous residence, no needs for HHC/DME. Action- discharge instructions prepared and given to pt, pt verbalized understanding. Medication information packet given r/t NEW and/or CHANGED prescriptions emphasizing name/purpose/side effects, pt verbalized understanding. Discharge instruction summary: Diet- carb control, Activity- as keith, Primary Care Physician as follows: Bernardo Garcia [phone] f/u appointment, immunizations reviewed , prescription medications filled none. Inpatient surgical procedure precautions reviewed: post heart cath   Response- Pt belongings gathered, IV removed. Disposition is home (no HHC/DME needs), transported with son, taken to lobby via w/c w/ this nurse, no complications.

## 2020-09-29 NOTE — PROGRESS NOTES
[facility]    9/29/2020 9:30 AM        Name: Luisito Ruano . Admitted: 9/28/2020  Primary Care Provider: Dony Martinez MD (Tel: [phone])                        Subjective:  . No acute events overnight. Resting well. Pain control. Diet ok. Labs reviewed  Denies any chest pain sob.      Reviewed interval ancillary notes    Current Medications  cetirizine (ZYRTEC) tablet 10 mg, Daily  levothyroxine (SYNTHROID) tablet 100 mcg, Daily  tiotropium (SPIRIVA RESPIMAT) 2.5 MCG/ACT inhaler 2 puff, Daily  sodium chloride flush 0.9 % injection 10 mL, 2 times per day  sodium chloride flush 0.9 % injection 10 mL, PRN  acetaminophen (TYLENOL) tablet 650 mg, Q6H PRN    Or  acetaminophen (TYLENOL) suppository 650 mg, Q6H PRN  polyethylene glycol (GLYCOLAX) packet 17 g, Daily PRN  promethazine (PHENERGAN) tablet 12.5 mg, Q6H PRN    Or  ondansetron (ZOFRAN) injection 4 mg, Q6H PRN  enoxaparin (LOVENOX) injection 40 mg, Daily  nitroGLYCERIN (NITROSTAT) SL tablet 0.4 mg, Q5 Min PRN  aspirin EC tablet 325 mg, Daily  0.9 % sodium chloride infusion, Continuous  insulin lispro (1 Unit Dial) 0-6 Units, TID WC  insulin lispro (1 Unit Dial) 0-3 Units, Nightly  valsartan (DIOVAN) tablet 80 mg, Daily    And  hydroCHLOROthiazide (HYDRODIURIL) tablet 12.5 mg, Daily  glucose (GLUTOSE) 40 % oral gel 15 g, PRN  dextrose 50 % IV solution, PRN  glucagon (rDNA) injection 1 mg, PRN  dextrose 5 % solution, PRN        Objective:  /76   Pulse 73   Temp 98.1 °F (36.7 °C) (Oral)   Resp 18   Ht 5' 1\" (1.549 m)   Wt 191 lb (86.6 kg)   SpO2 92%   BMI 36.09 kg/m²     Intake/Output Summary (Last 24 hours) at 9/29/2020 0930  Last data filed at 9/29/2020 0719  Gross per 24 hour   Intake --   Output 500 ml   Net -500 ml      Wt Readings from Last 3 Encounters:   09/29/20 191 lb (86.6 kg)   07/09/20 186 lb (84.4 kg)   01/10/20 192 lb (87.1 kg)       General appearance:  Appears comfortable  Eyes: Sclera clear. Pupils equal.  ENT: Moist oral mucosa. Trachea midline, no adenopathy. Cardiovascular: Regular rhythm, normal S1, S2. No murmur. No edema in lower extremities  Respiratory: Not using accessory muscles. Good inspiratory effort. Clear to auscultation bilaterally, no wheeze or crackles. GI: Abdomen soft, no tenderness, not distended, normal bowel sounds  Musculoskeletal: No cyanosis in digits, neck supple  Neurology: CN 2-12 grossly intact. No speech or motor deficits  Psych: Normal affect. Alert and oriented in time, place and person  Skin: Warm, dry, normal turgor    Labs and Tests:  CBC:   Recent Labs     09/28/20 2209   WBC 8.0   HGB 14.8        BMP:    Recent Labs     09/28/20 2209      K 3.7      CO2 23   BUN 14   CREATININE 0.7   GLUCOSE 198*     Hepatic: No results for input(s): AST, ALT, ALB, BILITOT, ALKPHOS in the last 72 hours. Discussed care with family and patient             Spent 30  minutes with patient and family at bedside and on unit reviewing medical records and labs, spent greater than 50% time counseling patient and family on diagnosis and plan   Problem List  Active Problems:    Chest pain, rule out acute myocardial infarction  Resolved Problems:    * No resolved hospital problems. *       Assessment & Plan:   1. Chest pain  -None right now. -Recently a stress test in January.   We will hold off on stress test consult cardiology  -Troponin negative seems more GI/musculoskeletal  -      Diet: Diet NPO Effective Now  Diet NPO, After Midnight  Diet NPO, After Midnight  Code:Full Code  DVT PPX lovenox       Carmela Marroquin MD   9/29/2020 9:30 AM

## 2020-09-29 NOTE — BRIEF OP NOTE
Cardiac Cath 9/29/2020:  Access: Right radial artery  Ultrasound: Ultrasound guidance used to determine after mentioned artery patency, size (> 2 mm), anatomic variations and ideal puncture location. Real-time ultrasound utilized concurrent with vascular needle entry into the artery. Images permanently recorded and reported in the patient chart. Hemostasis: TR band  Conscious sedation start time: 1424  Conscious sedation stop time: 1502  Versed: 2 mg  Fentanyl: 100 mcg  Bleeding risk: Intermediate  LVEDP: 7 mmHg  AO: 88/53 mmHg  Estimated blood loss: Less than 25 mL  Contrast: 66 mL  Fluoroscopy time: 4.8 min. Anatomy:   LM-normal  LAD-30% proximal and mid  Cx-normal  OM1- normal  RCA-20% mid and distal, dominant  RPDA- normal  LVEF-60%    Impression:  1. Mild nonobstructive CAD. 2.  Normal LV systolic function. 3.  Noncardiac chest pain. Plan:  1. Medical management of CAD. 2.  Can be discharged home from cardiology standpoint.

## 2020-09-29 NOTE — H&P
HOSPITALISTS HISTORY AND PHYSICAL    9/29/2020 12:16 AM    Patient Information:  Melida Arredondo is a 68 y.o. female [id number]  PCP:  Anne Conner MD (Tel: [phone] )    Chief complaint:    Chief Complaint   Patient presents with    Chest Pain     pt brought in by squad from home. pt c/o left shoulder and arm pain that started this evening. pt states pain then radiated to chest. pt had two episodes of vomiting as well. hx of a-fib. History of Present Illness:  Shaheen Roper is a 68 y.o. female with hx of HTN, HLD, DM, and A-fib. Presents to ER for Chest pain. She states pain started around 6pm tonight on the left side of her neck then started radiating to her chest around 8pm.  She describes pain as pressure. She vomited twice when pain started in her chest.  She reports pain improved after aspirin and nitro given in ER. She denies any recent illness. She denies any shortness of breath. She denies any injury to her neck. She has never had pain like this before. She had a normal stress echo 1/9/2020. However she denies having chest pain at that time and reports it was for her A-fib. History obtained from patient and son      REVIEW OF SYSTEMS:   Review of Systems   Constitutional: Negative. HENT: Negative. Eyes: Negative. Respiratory: Negative. Cardiovascular: Positive for chest pain. Negative for palpitations and leg swelling. Gastrointestinal: Positive for nausea. Negative for constipation, diarrhea and vomiting. Endocrine: Negative. Genitourinary: Negative. Musculoskeletal: Positive for back pain. Skin: Negative. Neurological: Negative. Hematological: Negative. Psychiatric/Behavioral: Negative.         Past Medical History:   has a past medical history of Arthritis, Asthma, COPD (chronic obstructive pulmonary disease) ([facility] [address] Diabetes mellitus ([facility] [address] Hyperlipidemia, Hypertension, Influenza, and Thyroid disease. Past Surgical History:   has a past surgical history that includes Colonoscopy. Medications:  No current facility-administered medications on file prior to encounter. Current Outpatient Medications on File Prior to Encounter   Medication Sig Dispense Refill    levothyroxine (SYNTHROID) 100 MCG tablet One daily 90 tablet 1    valsartan-hydrochlorothiazide (DIOVAN-HCT) 80-12.5 MG per tablet Take 1 tablet by mouth daily 90 tablet 3    ipratropium-albuterol (DUONEB) 0.5-2.5 (3) MG/3ML SOLN nebulizer solution USE 1 VIAL VIA NEBULIZER EVERY 6 HOURS 360 vial 3    fluticasone (FLONASE) 50 MCG/ACT nasal spray 2 sprays by Nasal route daily as needed (as needed) 1 Bottle 2    albuterol sulfate  (90 Base) MCG/ACT inhaler INHALE 2 PUFFS INTO LUNGS EVERY 6 HOURS AS NEEDED FOR SHORTNESS OF BREATH OR WHEEZING 8.5 g 5    tiotropium (SPIRIVA HANDIHALER) 18 MCG inhalation capsule Inhale 1 capsule into the lungs daily 30 capsule 6    cetirizine (ZYRTEC) 10 MG tablet Take 10 mg by mouth daily      fluticasone-salmeterol (ADVAIR) 250-50 MCG/DOSE AEPB Inhale 1 puff into the lungs every 12 hours 1 each 6    acetaminophen (TYLENOL) 500 MG tablet Take 1,000 mg by mouth daily as needed          Allergies: Allergies   Allergen Reactions    Aspirin      Recurrent GI blood Loss?  Sulfa Antibiotics      Other reaction(s): Other (See Comments)  hallucinations  hallucinations    Ace Inhibitors Palpitations     Swelling and cough  cough        Social History:  Patient Lives alone   reports that she quit smoking about 22 years ago. Her smoking use included cigarettes. She has a 10.00 pack-year smoking history. She has never used smokeless tobacco. She reports that she does not drink alcohol or use drugs.      Family History:  family history includes Cancer in her brother; Heart Disease in her mother and sister; High Blood Pressure in her mother and sister; Stroke in her mother. ,     Physical Exam:  BP (!) 116/59   Pulse 89   Temp 97.9 °F (36.6 °C) (Oral)   Resp 17   Wt 186 lb (84.4 kg)   SpO2 93%   BMI 35.14 kg/m²   Physical Exam  Vitals signs reviewed. Constitutional:       General: She is not in acute distress. Appearance: Normal appearance. She is normal weight. HENT:      Head: Normocephalic and atraumatic. Eyes:      Pupils: Pupils are equal, round, and reactive to light. Neck:      Musculoskeletal: Normal range of motion. Cardiovascular:      Rate and Rhythm: Normal rate and regular rhythm. Pulses: Normal pulses. Pulmonary:      Effort: Pulmonary effort is normal. No respiratory distress. Breath sounds: Normal breath sounds. Abdominal:      General: Abdomen is flat. Bowel sounds are normal. There is no distension. Palpations: Abdomen is soft. Musculoskeletal: Normal range of motion. Comments: Pt did have some myofacial tenderness on left side of neck. As well as pain to palpation of chest wall. However she denied pain was similar to what she was having. Skin:     General: Skin is warm and dry. Neurological:      General: No focal deficit present. Mental Status: She is alert and oriented to person, place, and time.    Psychiatric:         Mood and Affect: Mood normal.         Behavior: Behavior normal.         Labs:  CBC:   Lab Results   Component Value Date    WBC 8.0 09/28/2020    RBC 5.11 09/28/2020    HGB 14.8 09/28/2020    HCT 44.7 09/28/2020    MCV 87.5 09/28/2020    MCH 28.9 09/28/2020    MCHC 33.1 09/28/2020    RDW 13.8 09/28/2020     09/28/2020    MPV 8.8 09/28/2020     BMP:    Lab Results   Component Value Date     09/28/2020    K 3.7 09/28/2020    K 3.9 02/09/2018     09/28/2020    CO2 23 09/28/2020    BUN 14 09/28/2020    CREATININE 0.7 09/28/2020    CALCIUM 9.4 09/28/2020    GFRAA >60 09/28/2020    GFRAA >60 12/19/2011    LABGLOM >60 09/28/2020    GLUCOSE 198 09/28/2020     XR CHEST (2 VW)   Final Result   1. No acute cardiopulmonary findings. 2. Cardiomegaly. 3. Large hiatal hernia. Chest Xray:   EKG:    I visualized CXR images and EKG strips      Problem List  Active Problems:    Chest pain, rule out acute myocardial infarction  Resolved Problems:    * No resolved hospital problems. *        Assessment/Plan:   1. Chest Pain rule out Myocardial infarction   Pt will be admitted to observation with telemetry. ASA daily for now,  she reports allergy because of previous GI bleed but benefit outweighs  risk at this time. Serial troponin. Prn nitro. She is on an ARB but no BB will not start at this time given her COPD  Hx. If serial troponin are negative and no further chest pain will order  cardiac stress test in am.     2. Hypertension   BP stable continue home meds    3. Hyperlipidemia   She is currently not on a statin. Will check lipids in am.      4. Type II DM   Sliding scale insulin.   hgb a1c in am.     5. Hx of Paroxysmal A-fib, currently sinus rhythm     6. COPD no exacerbation         DVT prophylaxis Lovenox  Code status Full  Diet  NPO   IV access peripheral   Garrison Catheter none    Admit as Observation. I anticipate hospitalization spanning less than two midnights for investigation and treatment of the above medically necessary diagnoses. Please note that some part of this chart was generated using Dragon dictation software. Although every effort was made to ensure the accuracy of this automated transcription, some errors in transcription may have occurred inadvertently. If you may need any clarification, please do not hesitate to contact me through [facility].        OPAL Mueller CNP    9/29/2020 12:16 AM

## 2020-09-29 NOTE — ED NOTES
Report called to 3A, RN verbalized understanding and denied any need for further information, patient visible on tele monitor on unit, patient to be transported to unit at this time      Subhash Lai RN  09/29/20 0105

## 2020-09-29 NOTE — ED PROVIDER NOTES
Rohan Jimenez        Pt Name: Abhilash Villatoro  MRN: [id number]  [place] 1940  Date of evaluation: 9/28/2020  Provider: OPAL Persaud - CNP  PCP: Rochelle Salomon MD     I have seen and evaluated this patient with my supervising physician Ghazal Lance MD.    [address] [place] [address]       Chief Complaint   Patient presents with    Chest Pain     pt brought in by squad from home. pt c/o left shoulder and arm pain that started this evening. pt states pain then radiated to chest. pt had two episodes of vomiting as well. hx of a-fib. HISTORY OF PRESENT ILLNESS   (Location, Timing/Onset, Context/Setting, Quality, Duration, Modifying Factors, Severity, Associated Signs and Symptoms)  Note limiting factors. Abhilash Villatoro is a 68 y.o. female presents to the emergency department complaining of left-sided chest pain with radiation to the left arm and left neck. Reports onset of symptoms at 6 PM without mitigating exacerbating factors, states that the pain has slightly lessened without intervention. She denies history of chest pain. Reports that she is also had some vomiting this evening. Patient denies history of heart attack or known heart disease. States that she had a stress test many years ago. Denies any headache, fever, lightheadedness, dizziness, visual disturbances. No neck or back pain. No cough, or congestion. No abdominal pain, diarrhea, constipation, or dysuria. No rash. Nursing Notes were all reviewed and agreed with or any disagreements were addressed in the HPI. REVIEW OF SYSTEMS    (2-9 systems for level 4, 10 or more for level 5)     Review of Systems   Constitutional: Negative for activity change, chills and fever. Respiratory: Negative for chest tightness and shortness of breath. Cardiovascular: Positive for chest pain. Gastrointestinal: Positive for nausea and vomiting.  Negative for abdominal pain and diarrhea. Genitourinary: Negative for dysuria. All other systems reviewed and are negative. Positives and Pertinent negatives as per HPI. Except as noted above in the ROS, all other systems were reviewed and negative. PAST MEDICAL HISTORY     Past Medical History:   Diagnosis Date    Arthritis     Asthma     [facility]ch[facility] disease) ([facility] [address]     Diabetes mellitus ([facility] [address]     Hyperlipidemia     Hypertension     Influenza 02/08/2018    Thyroid disease          SURGICAL HISTORY     Past Surgical History:   Procedure Laterality Date    COLONOSCOPY           CURRENTMEDICATIONS       Current Discharge Medication List      CONTINUE these medications which have NOT CHANGED    Details   levothyroxine (SYNTHROID) 100 MCG tablet One daily  Qty: 90 tablet, Refills: 1    Associated Diagnoses: Acquired hypothyroidism      valsartan-hydrochlorothiazide (DIOVAN-HCT) 80-12.5 MG per tablet Take 1 tablet by mouth daily  Qty: 90 tablet, Refills: 3    Associated Diagnoses: Essential hypertension      ipratropium-albuterol (DUONEB) 0.5-2.5 (3) MG/3ML SOLN nebulizer solution USE 1 VIAL VIA NEBULIZER EVERY 6 HOURS  Qty: 360 vial, Refills: 3    Comments: **Patient requests 90 days supply**      fluticasone (FLONASE) 50 MCG/ACT nasal spray 2 sprays by Nasal route daily as needed (as needed)  Qty: 1 Bottle, Refills: 2    Associated Diagnoses:  Allergic rhinitis, unspecified seasonality, unspecified trigger      albuterol sulfate  (90 Base) MCG/ACT inhaler INHALE 2 PUFFS INTO LUNGS EVERY 6 HOURS AS NEEDED FOR SHORTNESS OF BREATH OR WHEEZING  Qty: 8.5 g, Refills: 5    Associated Diagnoses: Chronic obstructive pulmonary disease, unspecified COPD type (HCC)      tiotropium (SPIRIVA HANDIHALER) 18 MCG inhalation capsule Inhale 1 capsule into the lungs daily  Qty: 30 capsule, Refills: 6      cetirizine (ZYRTEC) 10 MG tablet Take 10 mg by mouth daily      fluticasone-salmeterol (ADVAIR) 250-50 MCG/DOSE AEPB Inhale 1 puff into the lungs every 12 hours  Qty: 1 each, Refills: 6      acetaminophen (TYLENOL) 500 MG tablet Take 1,000 mg by mouth daily as needed                ALLERGIES     Aspirin; Sulfa antibiotics; and Ace inhibitors    FAMILYHISTORY       Family History   Problem Relation Age of Onset    Heart Disease Mother     High Blood Pressure Mother     Stroke Mother     Heart Disease Sister     High Blood Pressure Sister     Cancer Brother         lung          SOCIAL HISTORY       Social History     Tobacco Use    Smoking status: Former Smoker     Packs/day: 1.00     Years: 10.00     Pack years: 10.00     Types: Cigarettes     Last attempt to quit: 10/5/1998     Years since quittin.0    Smokeless tobacco: Never Used    Tobacco comment: was only smoking socially on and off   Substance Use Topics    Alcohol use: No     Alcohol/week: 0.0 standard drinks    Drug use: No       SCREENINGS       PHYSICAL EXAM    (up to 7 for level 4, 8 or more for level 5)     ED Triage Vitals   BP Temp Temp Source Pulse Resp SpO2 Height Weight   20 2150 20 2149 20 2149 20 -- 20   139/64 97.9 °F (36.6 °C) Oral 96 17 94 %  186 lb (84.4 kg)       Physical Exam  Vitals signs and nursing note reviewed. Constitutional:       Appearance: She is well-developed. She is not diaphoretic. HENT:      Head: Normocephalic and atraumatic. Right Ear: External ear normal.      Left Ear: External ear normal.   Eyes:      General:         Right eye: No discharge. Left eye: No discharge. Neck:      Musculoskeletal: Normal range of motion and neck supple. Vascular: No JVD. Cardiovascular:      Rate and Rhythm: Normal rate. Rhythm irregular. Pulses: Normal pulses. Heart sounds: Normal heart sounds. Comments: A.fib  Pulmonary:      Effort: Pulmonary effort is normal. No respiratory distress. Breath sounds: Normal breath sounds. Abdominal:      Palpations: Abdomen is soft. Musculoskeletal: Normal range of motion. Skin:     General: Skin is warm and dry. Coloration: Skin is not pale. Neurological:      Mental Status: She is alert and oriented to person, place, and time. Psychiatric:         Behavior: Behavior normal.         DIAGNOSTIC RESULTS   LABS:    Labs Reviewed   BASIC METABOLIC PANEL - Abnormal; Notable for the following components:       Result Value    Glucose 198 (*)     All other components within normal limits    Narrative:     Performed at:  [facility] [address], [place]   Phone [phone]   TROPONIN    Narrative:     Performed at:  [facility] [address], [place]   Phone [phone]   CBC WITH AUTO DIFFERENTIAL    Narrative:     Performed at:  [facility]  [address], [place]   Phone [phone]   PROTIME-INR    Narrative:     Performed at:  [facility] [address], [place]   Phone [phone]   LIPASE    Narrative:     Performed at:  [facility]  [address], [place]   Phone [phone]   BRAIN NATRIURETIC PEPTIDE    Narrative:     Performed at:  [facility]  [address], [place]   Phone [phone]   TROPONIN   TROPONIN   LIPID PANEL   HEMOGLOBIN A1C   POCT GLUCOSE    Narrative:     Performed at:  [facility] [address]   Phone [phone]   POCT GLUCOSE   POCT GLUCOSE       All other labs were within normal range or not returned as of this dictation. EKG: All EKG's are interpreted by the Emergency Department Physician in the absence of a cardiologist.  Please see their note for interpretation of EKG.       RADIOLOGY:   Non-plain film images such as CT, Ultrasound and MRI are read by the radiologist. Plain radiographic images are visualized and preliminarily interpreted by the ED Provider with the below findings:        Interpretation per the Radiologist below, if available at the time of this note:    XR CHEST (2 VW)   Final Result   1. No acute cardiopulmonary findings. 2. Cardiomegaly. 3. Large hiatal hernia. No results found. PROCEDURES   Unless otherwise noted below, none     Procedures    CRITICAL CARE TIME   The total critical care time spent while evaluating and treating this patient was at least 33 minutes. This excludes time spent doing separately billable procedures. This includes time at the bedside, data interpretation, medication management, obtaining critical history from collateral sources if the patient is unable to provide it directly, and physician consultation. Specifics of interventions taken and potentially life-threatening diagnostic considerations are listed above in the medical decision making.       CONSULTS:  IP CONSULT TO SOCIAL WORK      EMERGENCY DEPARTMENT COURSE and DIFFERENTIAL DIAGNOSIS/MDM:   Vitals:    Vitals:    09/29/20 0045 09/29/20 0111 09/29/20 0115 09/29/20 0150   BP: 111/63 121/72     Pulse: 78 79  72   Resp:  20     Temp:  97.7 °F (36.5 °C)     TempSrc:  Oral     SpO2: 93% 93%     Weight:  191 lb (86.6 kg)     Height:   5' 1\" (1.549 m)        Patient was given the following medications:  Medications   cetirizine (ZYRTEC) tablet 10 mg (has no administration in time range)   levothyroxine (SYNTHROID) tablet 100 mcg (has no administration in time range)   tiotropium (SPIRIVA RESPIMAT) 2.5 MCG/ACT inhaler 2 puff (has no administration in time range)   sodium chloride flush 0.9 % injection 10 mL (has no administration in time range)   sodium chloride flush 0.9 % injection 10 mL (has no administration in time range)   acetaminophen (TYLENOL) tablet 650 mg (has no administration in time range)     Or   acetaminophen (TYLENOL) suppository 650 mg (has no administration in time range)   polyethylene glycol (GLYCOLAX) packet 17 g (has no administration in time range)   promethazine (PHENERGAN) tablet 12.5 mg (has no administration in time range)     Or   ondansetron (ZOFRAN) injection 4 mg (has no administration in time range)   enoxaparin (LOVENOX) injection 40 mg (has no administration in time range)   nitroGLYCERIN (NITROSTAT) SL tablet 0.4 mg (has no administration in time range)   aspirin EC tablet 325 mg (has no administration in time range)   0.9 % sodium chloride infusion ( Intravenous New Bag 9/29/20 0212)   insulin lispro (1 Unit Dial) 0-6 Units (has no administration in time range)   insulin lispro (1 Unit Dial) 0-3 Units (0 Units Subcutaneous Not Given 9/29/20 0158)   valsartan (DIOVAN) tablet 80 mg (has no administration in time range)     And   hydroCHLOROthiazide (HYDRODIURIL) tablet 12.5 mg (has no administration in time range)   glucose (GLUTOSE) 40 % oral gel 15 g (has no administration in time range)   dextrose 50 % IV solution (has no administration in time range)   glucagon (rDNA) injection 1 mg (has no administration in time range)   dextrose 5 % solution (has no administration in time range)   nitroGLYCERIN (NITROSTAT) SL tablet 0.4 mg (0.4 mg Sublingual Given 9/28/20 2225)   aspirin chewable tablet 324 mg (324 mg Oral Given 9/28/20 2225)           Briefly, this is a 68year old female that  presents to the emergency department complaining of left-sided chest pain with radiation to the left arm and left neck. Reports onset of symptoms at 6 PM without mitigating exacerbating factors, states that the pain has slightly lessened without intervention. She denies history of chest pain. Reports that she is also had some vomiting this evening. She does note history of atrial fibrillation, she is not anticoagulated.   Also has history of hypercholesterolemia, obesity, and hypertension. Basic metabolic panel is unremarkable. Troponin negative. CBC unremarkable. BMP within normal limits. Patient is pain-free after aspirin nitroglycerin. She will be admitted for serial enzymes and follow-up stress test if necessary. Hospitalist is gracious enough to admit this patient. FINAL IMPRESSION      1. Chest pain, unspecified type    2. Hiatal hernia    3. Hyperglycemia          DISPOSITION/PLAN   DISPOSITION        PATIENT REFERREDTO:  No follow-up provider specified.     DISCHARGE MEDICATIONS:  Current Discharge Medication List          DISCONTINUED MEDICATIONS:  Current Discharge Medication List                 (Please note that portions of this note were completed with a voice recognition program.  Efforts were made to edit the dictations but occasionally words are mis-transcribed.)    OPAL Marino CNP (electronically signed)           OPAL Marino CNP  09/29/20 9337

## 2020-09-29 NOTE — PROGRESS NOTES
Pt return from cath to room 3305 at 1515. Right radial Cath site CDI, pulses +2 bilaterally, compression device. 12ml air at transfer to start removing per orders at 1535. Vitals stable, to be taken per Bedrest complete 1335, patient aware, denies questions. Report from cath nurse . Dr Donald Pastrana ok with discharge. Will continue to monitor.

## 2020-09-29 NOTE — CONSULTS
[address] [facility]  [phone]        Reason for Consultation/Chief Complaint: \"Chest pain. \"    History of Present Illness:  Shaheen Roepr is a 68 y.o. patient who presented to the hospital with complaints of chest pain. She describes a 1 day history of left neck, shoulder and back discomfort. it was followed by left-sided chest discomfort which she describes as a heaviness. She had some associated nausea and emesis. It was relieved with nitroglycerin and aspirin. She does state that she had a past history of right-sided chest discomfort. She had a negative stress test for ischemia in January 2020. She states generally she is active. She does have history of hiatal hernia. She apparently has an aspirin allergy that caused some form of bleeding many years ago but she really does not have the details. Received aspirin in the ER yesterday and so far no adverse effects. Past Medical History:   has a past medical history of Arthritis, Asthma, COPD (chronic obstructive pulmonary disease) ([address] Diabetes mellitus ([address] [address] Hyperlipidemia, Hypertension, Influenza, and Thyroid disease. Surgical History:   has a past surgical history that includes Colonoscopy. Social History:   reports that she quit smoking about 22 years ago. Her smoking use included cigarettes. She has a 10.00 pack-year smoking history. She has never used smokeless tobacco. She reports that she does not drink alcohol or use drugs. Family History:  family history includes Cancer in her brother; Heart Disease in her mother and sister; High Blood Pressure in her mother and sister; Stroke in her mother. Home Medications:  Were reviewed and are listed in nursing record. and/or listed below  Prior to Admission medications    Medication Sig Start Date End Date Taking?  Authorizing Provider   levothyroxine (SYNTHROID) 100 MCG tablet One daily 7/9/20  Yes Aminta Ordonez MD   valsartan-hydrochlorothiazide (DIOVAN-HCT) 80-12.5 MG per tablet Take 1 tablet by mouth daily 7/9/20  Yes Patricia Martines MD   ipratropium-albuterol (DUONEB) 0.5-2.5 (3) MG/3ML SOLN nebulizer solution USE 1 VIAL VIA NEBULIZER EVERY 6 HOURS 9/27/19  Yes Patricia Martines MD   fluticasone [facility]) 50 MCG/ACT nasal spray 2 sprays by Nasal route daily as needed (as needed) 5/3/19  Yes Patricia Martines MD   albuterol sulfate  (90 Base) MCG/ACT inhaler INHALE 2 PUFFS INTO LUNGS EVERY 6 HOURS AS NEEDED FOR SHORTNESS OF BREATH OR WHEEZING 5/3/19  Yes Patricia Martines MD   tiotropium (SPIRIVA HANDIHALER) 18 MCG inhalation capsule Inhale 1 capsule into the lungs daily 10/5/18  Yes Efraín Mitchell MD   cetirizine (ZYRTEC) 10 MG tablet Take 10 mg by mouth daily   Yes Historical Provider, MD   fluticasone-salmeterol (ADVAIR) 250-50 MCG/DOSE AEPB Inhale 1 puff into the lungs every 12 hours 3/26/18   Efraín Mitchell MD   acetaminophen (TYLENOL) 500 MG tablet Take 1,000 mg by mouth daily as needed     Historical Provider, MD        Allergies:  Aspirin; Sulfa antibiotics; and Ace inhibitors     Review of Systems:   A complete review of systems has been reviewed and updated today and is negative except as noted in the history of present illness.       Physical Examination:    Vitals:    09/29/20 0830   BP: 125/76   Pulse: 73   Resp: 18   Temp: 98.1 °F (36.7 °C)   SpO2: 92%    Weight: 191 lb (86.6 kg)         General Appearance:  Alert, cooperative, no distress, appears stated age   Head:  Normocephalic, without obvious abnormality, atraumatic   Eyes:  EOMI, conjunctiva/corneas clear       Nose: Nares normal   Throat: Lips normal   Neck: Supple, symmetrical, trachea midline,  no carotid bruit or JVD       Lungs:   Clear to auscultation bilaterally, respirations unlabored   Chest Wall:  No tenderness or deformity   Heart:  Regular rate and rhythm, S1, S2 normal, no significant murmur, rub or gallop   Abdomen:   Soft, non-tender, bowel sounds active all four quadrants,  no masses, no organomegaly           Extremities: Extremities normal, atraumatic, no cyanosis or edema   Pulses: 2+ and symmetric   Skin: Skin color, texture, turgor normal, no rashes or lesions   Pysch: Normal mood and affect   Neurologic: Normal gross motor and sensory exam.         Labs  CBC:   Lab Results   Component Value Date    WBC 8.0 09/28/2020    RBC 5.11 09/28/2020    HGB 14.8 09/28/2020    HCT 44.7 09/28/2020    MCV 87.5 09/28/2020    RDW 13.8 09/28/2020     09/28/2020     CMP:    Lab Results   Component Value Date     09/28/2020    K 3.7 09/28/2020    K 3.9 02/09/2018     09/28/2020    CO2 23 09/28/2020    BUN 14 09/28/2020    CREATININE 0.7 09/28/2020    GFRAA >60 09/28/2020    GFRAA >60 12/19/2011    AGRATIO 1.5 06/23/2020    LABGLOM >60 09/28/2020    GLUCOSE 198 09/28/2020    PROT 6.7 06/23/2020    CALCIUM 9.4 09/28/2020    BILITOT 1.0 06/23/2020    ALKPHOS 78 06/23/2020    AST 19 06/23/2020    ALT 7 06/23/2020     LIPIDS: No components found for: TOTAL CHOLESTEROL,  HDL,  LDL,  TRIGLYCERIDES  PT/INR:  No results found for: PTINR  Lab Results   Component Value Date    TROPONINI <0.01 09/29/2020       EKG:  I have reviewed EKG with the following interpretation:  Impression:    28-SEP-2020 22:25:49 [facility] ROUTINE RECORD  Sinus rhythm with Premature atrial complexes  Otherwise normal ECG    Imaging/Procedures:   Myoview stress test 1/10/2020:  Summary     Normal myocardial perfusion.     Normal LV size and systolic function. Echo 9/29/2020:  Summary   -Normal left ventricle size, wall thickness, and systolic function with an   estimated ejection fraction of 55%. Grade I diastolic dysfunction with   normal LV filling pressures. -Mild mitral regurgitation.   -The left atrium is mildly dilated. -The aortic valve appears sclerotic but opens well.    -Mild tricuspid regurgitation with PASP of 31 mmHg.    Assessment/Plan:  Principal Problem:    Unstable angina ([address]  Plan: Recurrent chest pain with concern for unstable angina. Stress test negative for ischemia in January 2020. Recommend cardiac catheterization. Performance of the procedure as well as the associated risks, benefits and alternatives have been discussed. Good and pertinent questions have been asked and answered. Thank you for allowing us to participate in the care of Carlos Eduardo Fam. Further evaluation will be based upon the patient's clinical course and testing results. All questions and concerns were addressed to the patient/family. Alternatives to my treatment were discussed. The note was completed using EMR. Every effort was made to ensure accuracy; however, inadvertent computerized transcription errors may be present.     Nicole Summers M.D.

## 2020-10-05 NOTE — DISCHARGE SUMMARY
[facility] DISCHARGE SUMMARY    Patient Demographics    Patient. Birgit Fish  Date of Birth. 1940  MRN. [id number]     Primary care provider. Bib Ureña MD  (Tel: [phone])    Admit date: 9/28/2020    Discharge date (blank if same as Note Date): 9/29/2020  Note Date: 10/5/2020     Reason for Hospitalization. Chief Complaint   Patient presents with    Chest Pain     pt brought in by squad from home. pt c/o left shoulder and arm pain that started this evening. pt states pain then radiated to chest. pt had two episodes of vomiting as well. hx of a-fib. [facility] Course. Chest pain  - acute ACS ruled out  - under went  Cardiac cath which was negative  - medical mamangment  - discharged stable    Consults. IP CONSULT TO SOCIAL WORK  IP CONSULT TO CARDIOLOGY    Physical examination on discharge day. /67   Pulse 90   Temp 97.5 °F (36.4 °C) (Oral)   Resp 20   Ht 5' 1\" (1.549 m)   Wt 191 lb (86.6 kg)   SpO2 91%   BMI 36.09 kg/m²   General appearance. Alert. Looks comfortable. HEENT. Sclera clear. Moist mucus membranes. Cardiovascular. Regular rate and rhythm, normal S1, S2. No murmur. Respiratory. Not using accessory muscles. Clear to auscultation bilaterally, no wheeze. Gastrointestinal. Abdomen soft, non-tender, not distended, normal bowel sounds  Neurology. Facial symmetry. No speech deficits. Moving all extremities equally. Extremities. No edema in lower extremities. Skin. Warm, dry, normal turgor    Condition at time of discharge stable     Medication instructions provided to patient at discharge.      Medication List      CONTINUE taking these medications    acetaminophen 500 MG tablet  Commonly known as:  TYLENOL  Notes to patient:  Pain/fever reducer  Side effects: upset stomach   Do not take more than 4,000mg tylenol (acetamenophen) in a 24 FAST ACTING OR RESCUE INHALER      valsartan-hydrochlorothiazide 80-12.5 MG per tablet  Commonly known as:  DIOVAN-HCT  Take 1 tablet by mouth daily  Notes to patient:  Thiazide diuretic (water pill) that helps prevent your body from absorbing too much salt, which can cause fluid retention  Side effects: sore throat            Discharge recommendations given to patient. Follow Up. pcp in 1 week   Disposition. home  Activity. activity as tolerated  Diet: No diet orders on file      Spent 45  minutes in discharge process.     Signed:  Lakeisha Fierro MD     10/5/2020 11:31 AM

## 2020-10-15 ENCOUNTER — OFFICE VISIT (OUTPATIENT)
Dept: CARDIOLOGY CLINIC | Age: 80
End: 2020-10-15
Payer: MEDICARE

## 2020-10-15 VITALS
OXYGEN SATURATION: 95 % | SYSTOLIC BLOOD PRESSURE: 118 MMHG | HEART RATE: 93 BPM | DIASTOLIC BLOOD PRESSURE: 70 MMHG | WEIGHT: 186.8 LBS | HEIGHT: 61 IN | BODY MASS INDEX: 35.27 KG/M2

## 2020-10-15 PROCEDURE — 99214 OFFICE O/P EST MOD 30 MIN: CPT | Performed by: NURSE PRACTITIONER

## 2020-10-15 RX ORDER — ATORVASTATIN CALCIUM 20 MG/1
20 TABLET, FILM COATED ORAL DAILY
Qty: 90 TABLET | Refills: 1 | Status: SHIPPED | OUTPATIENT
Start: 2020-10-15 | End: 2021-06-21

## 2020-10-15 NOTE — PROGRESS NOTES
VIAL VIA NEBULIZER EVERY 6 HOURS 360 vial 3    fluticasone (FLONASE) 50 MCG/ACT nasal spray 2 sprays by Nasal route daily as needed (as needed) 1 Bottle 2    albuterol sulfate  (90 Base) MCG/ACT inhaler INHALE 2 PUFFS INTO LUNGS EVERY 6 HOURS AS NEEDED FOR SHORTNESS OF BREATH OR WHEEZING 8.5 g 5    tiotropium (SPIRIVA HANDIHALER) 18 MCG inhalation capsule Inhale 1 capsule into the lungs daily 30 capsule 6    cetirizine (ZYRTEC) 10 MG tablet Take 10 mg by mouth daily      fluticasone-salmeterol (ADVAIR) 250-50 MCG/DOSE AEPB Inhale 1 puff into the lungs every 12 hours 1 each 6    acetaminophen (TYLENOL) 500 MG tablet Take 1,000 mg by mouth daily as needed        No current facility-administered medications for this visit. REVIEW OF SYSTEMS:   CONSTITUTIONAL: No major weight gain or loss, fatigue, weakness, night sweats or fever. There's been no change in energy level, sleep pattern, or activity level. HEENT: No new vision difficulties or ringing in the ears. RESPIRATORY: No new SOB, PND, orthopnea or cough. CARDIOVASCULAR: See HPI  GI: No nausea, vomiting, diarrhea, constipation, abdominal pain or changes in bowel habits. : No urinary frequency, urgency, incontinence hematuria or dysuria. SKIN: No cyanosis or skin lesions. MUSCULOSKELETAL: No new muscle or joint pain. NEUROLOGICAL: No syncope or TIA-like symptoms.   PSYCHIATRIC: No anxiety, pain, insomnia or depression    Objective:   PHYSICAL EXAM:        Vitals:    10/15/20 1253 10/15/20 1315   BP: 116/74 118/70   Site: Left Upper Arm    Position: Sitting    Cuff Size: Large Adult    Pulse: 93    SpO2: 95%    Weight: 186 lb 12.8 oz (84.7 kg)    Height: 5' 1\" (1.549 m)       VITALS:  /70   Pulse 93   Ht 5' 1\" (1.549 m)   Wt 186 lb 12.8 oz (84.7 kg)   SpO2 95%   BMI 35.30 kg/m²     CONSTITUTIONAL: Cooperative, no apparent distress, and appears well nourished / developed  NEUROLOGIC:  Awake and orientated to person, place and time.  PSYCH: Calm affect. SKIN: Warm and dry. Right radial site c/d/I. No hematoma or bruising. Good pulses, color, warmth, and sensation to that extremity. HEENT: Sclera non-icteric, normocephalic, neck supple, no elevation of JVP, normal carotid pulses with no bruits. RESPIRATORY:  No increased work of breathing and clear to auscultation, no crackles or wheezing. CARDIOVASCULAR:  Regular rate and rhythm with Ectopy, no murmurs, gallops, or rubs, normal PMI. Heart tones are crisp and normal. Cervical veins are not engorged. GI:  Normal bowel sounds, non-distended and non-tender to palpation  EXT: No edema, no calf tenderness. Pulses are present bilaterally. DATA:    Lab Results   Component Value Date    ALT 7 06/23/2020    AST 19 06/23/2020    ALKPHOS 78 06/23/2020    BILITOT 1.0 06/23/2020     Lab Results   Component Value Date    CREATININE 0.7 09/28/2020    BUN 14 09/28/2020     09/28/2020    K 3.7 09/28/2020     09/28/2020    CO2 23 09/28/2020     Lab Results   Component Value Date    TSH 2.810 01/18/2017     Lab Results   Component Value Date    WBC 8.0 09/28/2020    HGB 14.8 09/28/2020    HCT 44.7 09/28/2020    MCV 87.5 09/28/2020     09/28/2020     No components found for: CHLPL  Lab Results   Component Value Date    TRIG 71 09/29/2020    TRIG 93 06/23/2020    TRIG 74 09/27/2018     Lab Results   Component Value Date    HDL 40 09/29/2020    HDL 40 06/23/2020    HDL 49 09/27/2018     Lab Results   Component Value Date    LDLCALC 119 (H) 09/29/2020    LDLCALC 101 (H) 06/23/2020    LDLCALC 122 (H) 09/27/2018     Lab Results   Component Value Date    LABVLDL 14 09/29/2020    LABVLDL 19 06/23/2020    LABVLDL 15 09/27/2018     Radiology Review:  Pertinent images / reports were reviewed as a part of this visit and reveals the following:    Last Echo: 1/9/20   Summary   -Normal left ventricle size, wall thickness, and systolic function with an   estimated ejection fraction of 55%. Grade I diastolic dysfunction with   normal LV filling pressures. -Mild mitral regurgitation.   -The left atrium is mildly dilated. -The aortic valve appears sclerotic but opens well. -Mild tricuspid regurgitation with PASP of 31 mmHg. Last Stress Test: 1/10/20  Summary     Normal myocardial perfusion.     Normal LV size and systolic function. Last Angiogram: 9/29/20    Anatomy:   LM-normal  LAD-30% proximal and mid  Cx-normal  OM1- normal  RCA-20% mid and distal, dominant  RPDA- normal  LVEF-60%     Impression:  1. Mild nonobstructive CAD. 2.  Normal LV systolic function. 3.  Noncardiac chest pain.     Plan:  1. Medical management of CAD. 2.  Can be discharged home from cardiology standpoint. Assessment:     1. Coronary artery disease  ~ stable ; no c/o CP   ~ [facility] 9/29/20: mild nonobstructive CAD   ~ allergy to aspirin     2. Shortness of breath    ~ stable   ~ [facility] nonobstructive CAD  ~ echo unremarkable   ~ hx of Asthma/ COPD and uses inhalers     3. Hypertension   ~ controlled     4. Hyperlipidemia   ~  (9/2020)    Patient  is stable since hospital discharge. Plan:   1. Start lipitor 20mg nightly    Can try CoQ 10 to decrease chances of myalgias     Fasting blood work in 2 months   2. Follow up with Dr. Guicho Del Rosario in 3-4 months    Call with any questions or concerns     I have addressed the patient's cardiac risk factors and adjusted pharmacologic treatment as needed. In addition, I have reinforced the need for patient directed risk factor modification. Further evaluation will be based upon the patient's clinical course and testing results. All questions and concerns were addressed to the patient. Alternatives to  treatment were discussed. The patient  currently  is not smoking. The risks related to smoking were reviewed with the patient. Recommend maintaining a smoke-free lifestyle. Angiotension receptor blocker has been prescribed / recommended.  Daily weight, low sodium diet were discussed. Patient instructed to call the office with a weight gain: > 3 # over night or 5# in one week; swelling, SOB/orthopnea/PND    Pt is not on a BB ; neg MI   Pt is on an ARB  Pt is on a statin      Low Saturated fat diet discussed  Exercise program discussed    Thank you for allowing to us to participate in the care of Alban Soto.     [address]  Documentation of today's visit sent to PCP

## 2020-10-15 NOTE — PATIENT INSTRUCTIONS
Start taking Lipitor 20mg nightly to help lower your cholesterol. Can try over the counter Co enzyme Q10 to help decrease chances of having the side effect of muscle pain with Lipitor. If muscle pain does occur please call the office. Complete fasting blood work in two months to recheck cholesterol and liver function after starting Lipitor. Fast for 12 hours prior, may have black coffee or water. Follow up in 3-4 months with Dr. Jocelynn Galloway   If you have any questions or concerns prior to that do not hesitate to call. Call Dr. Glenn Jaquez to get a refill on your inhalers.  # [phone]

## 2020-11-05 ENCOUNTER — TELEPHONE (OUTPATIENT)
Dept: PRIMARY CARE CLINIC | Age: 80
End: 2020-11-05

## 2020-12-08 RX ORDER — ALBUTEROL SULFATE 90 UG/1
AEROSOL, METERED RESPIRATORY (INHALATION)
Qty: 8.5 G | Refills: 5 | Status: ON HOLD | OUTPATIENT
Start: 2020-12-08 | End: 2021-10-09 | Stop reason: HOSPADM

## 2020-12-15 ENCOUNTER — NURSE TRIAGE (OUTPATIENT)
Dept: OTHER | Facility: CLINIC | Age: 80
End: 2020-12-15

## 2020-12-15 ENCOUNTER — OFFICE VISIT (OUTPATIENT)
Dept: FAMILY MEDICINE CLINIC | Age: 80
End: 2020-12-15
Payer: MEDICARE

## 2020-12-15 VITALS
WEIGHT: 186 LBS | OXYGEN SATURATION: 96 % | BODY MASS INDEX: 35.12 KG/M2 | HEART RATE: 73 BPM | DIASTOLIC BLOOD PRESSURE: 72 MMHG | TEMPERATURE: 96.8 F | HEIGHT: 61 IN | SYSTOLIC BLOOD PRESSURE: 128 MMHG

## 2020-12-15 PROCEDURE — 1036F TOBACCO NON-USER: CPT | Performed by: NURSE PRACTITIONER

## 2020-12-15 PROCEDURE — 1123F ACP DISCUSS/DSCN MKR DOCD: CPT | Performed by: NURSE PRACTITIONER

## 2020-12-15 PROCEDURE — G8399 PT W/DXA RESULTS DOCUMENT: HCPCS | Performed by: NURSE PRACTITIONER

## 2020-12-15 PROCEDURE — G8427 DOCREV CUR MEDS BY ELIG CLIN: HCPCS | Performed by: NURSE PRACTITIONER

## 2020-12-15 PROCEDURE — G8417 CALC BMI ABV UP PARAM F/U: HCPCS | Performed by: NURSE PRACTITIONER

## 2020-12-15 PROCEDURE — 1090F PRES/ABSN URINE INCON ASSESS: CPT | Performed by: NURSE PRACTITIONER

## 2020-12-15 PROCEDURE — G8482 FLU IMMUNIZE ORDER/ADMIN: HCPCS | Performed by: NURSE PRACTITIONER

## 2020-12-15 PROCEDURE — 99213 OFFICE O/P EST LOW 20 MIN: CPT | Performed by: NURSE PRACTITIONER

## 2020-12-15 PROCEDURE — 4040F PNEUMOC VAC/ADMIN/RCVD: CPT | Performed by: NURSE PRACTITIONER

## 2020-12-15 RX ORDER — OLOPATADINE HYDROCHLORIDE 2 MG/ML
1 SOLUTION/ DROPS OPHTHALMIC DAILY
Qty: 1 BOTTLE | Refills: 0 | Status: SHIPPED | OUTPATIENT
Start: 2020-12-15 | End: 2020-12-25

## 2020-12-15 RX ORDER — FLUTICASONE PROPIONATE 50 MCG
2 SPRAY, SUSPENSION (ML) NASAL DAILY PRN
Qty: 1 BOTTLE | Refills: 2 | Status: SHIPPED | OUTPATIENT
Start: 2020-12-15 | End: 2022-02-01 | Stop reason: SDUPTHER

## 2020-12-15 ASSESSMENT — ENCOUNTER SYMPTOMS
SINUS PAIN: 0
EYE ITCHING: 1
FACIAL SWELLING: 0
EYE PAIN: 1
EYE REDNESS: 0
EYE DISCHARGE: 0
RHINORRHEA: 0
PHOTOPHOBIA: 0

## 2020-12-15 ASSESSMENT — PATIENT HEALTH QUESTIONNAIRE - PHQ9
SUM OF ALL RESPONSES TO PHQ QUESTIONS 1-9: 0
SUM OF ALL RESPONSES TO PHQ9 QUESTIONS 1 & 2: 0
2. FEELING DOWN, DEPRESSED OR HOPELESS: 0
1. LITTLE INTEREST OR PLEASURE IN DOING THINGS: 0
SUM OF ALL RESPONSES TO PHQ QUESTIONS 1-9: 0
SUM OF ALL RESPONSES TO PHQ QUESTIONS 1-9: 0

## 2020-12-15 NOTE — PROGRESS NOTES
Bel Ferrari  : 1940  Encounter date: 12/15/2020    This jerson 68 y.o. female who presents with  Chief Complaint   Patient presents with    Eye Pain     B/L eye pain x 2 days       History of present illness:    HPI PT is 68year old female with left eye tenderness and R eye watery that started 2 days ago. Pt reports gritty sensation but denies known exposure/irritation. Pt denies change in vision or headaches. Pt with history of seasonal allergies, reports eyes itching but since resolved. Denies eye discharge or matted eyelashes. Current Outpatient Medications on File Prior to Visit   Medication Sig Dispense Refill    albuterol sulfate  (90 Base) MCG/ACT inhaler INHALE 2 PUFFS INTO LUNGS EVERY 6 HOURS AS NEEDED FOR SHORTNESS OF BREATH OR WHEEZING 8.5 g 5    atorvastatin (LIPITOR) 20 MG tablet Take 1 tablet by mouth daily 90 tablet 1    levothyroxine (SYNTHROID) 100 MCG tablet One daily 90 tablet 1    valsartan-hydrochlorothiazide (DIOVAN-HCT) 80-12.5 MG per tablet Take 1 tablet by mouth daily 90 tablet 3    ipratropium-albuterol (DUONEB) 0.5-2.5 (3) MG/3ML SOLN nebulizer solution USE 1 VIAL VIA NEBULIZER EVERY 6 HOURS 360 vial 3    tiotropium (SPIRIVA HANDIHALER) 18 MCG inhalation capsule Inhale 1 capsule into the lungs daily 30 capsule 6    cetirizine (ZYRTEC) 10 MG tablet Take 10 mg by mouth daily      fluticasone-salmeterol (ADVAIR) 250-50 MCG/DOSE AEPB Inhale 1 puff into the lungs every 12 hours 1 each 6    acetaminophen (TYLENOL) 500 MG tablet Take 1,000 mg by mouth daily as needed        No current facility-administered medications on file prior to visit. Allergies   Allergen Reactions    Aspirin      Recurrent GI blood Loss?  Sulfa Antibiotics      Other reaction(s):  Other (See Comments)  hallucinations  hallucinations    Ace Inhibitors Palpitations     Swelling and cough  cough     Past Medical History:   Diagnosis Date    Arthritis     Asthma  COPD [facility]chronic obstructive [facility] disease) ([facility]     Diabetes mellitus ([facility]     Hyperlipidemia     Hypertension     Influenza 2018    Thyroid disease       Past Surgical History:   Procedure Laterality Date    COLONOSCOPY        Family History   Problem Relation Age of Onset    Heart Disease Mother     High Blood Pressure Mother     Stroke Mother     Heart Disease Sister     High Blood Pressure Sister     Cancer Brother         lung      Social History     Tobacco Use    Smoking status: Former Smoker     Packs/day: 1.00     Years: 10.00     Pack years: 10.00     Types: Cigarettes     Quit date: 10/5/1998     Years since quittin.2    Smokeless tobacco: Never Used    Tobacco comment: was only smoking socially on and off   Substance Use Topics    Alcohol use: No     Alcohol/week: 0.0 standard drinks        Review of Systems   Constitutional: Negative for chills and fever. HENT: Negative for congestion, facial swelling, rhinorrhea, sinus pain and sneezing. Eyes: Positive for pain and itching. Negative for photophobia, discharge, redness and visual disturbance. Allergic/Immunologic: Positive for environmental allergies. Neurological: Negative for dizziness and headaches. Objective:    /72 (Site: Left Upper Arm, Position: Sitting, Cuff Size: Large Adult)   Pulse 73   Temp 96.8 °F (36 °C) (Temporal)   Ht 5' 1\" (1.549 m)   Wt 186 lb (84.4 kg)   SpO2 96%   BMI 35.14 kg/m²   Weight: 186 lb (84.4 kg)     BP Readings from Last 3 Encounters:   12/15/20 128/72   10/15/20 118/70   20 104/67     Wt Readings from Last 3 Encounters:   12/15/20 186 lb (84.4 kg)   10/15/20 186 lb 12.8 oz (84.7 kg)   20 191 lb (86.6 kg)     BMI Readings from Last 3 Encounters:   12/15/20 35.14 kg/m²   10/15/20 35.30 kg/m²   20 36.09 kg/m²       Physical Exam  Vitals signs reviewed. Constitutional:       Appearance: Normal appearance. She is well-developed.    Eyes: General:         Right eye: Discharge present. Left eye: Discharge present. Extraocular Movements: Extraocular movements intact. Pupils: Pupils are equal, round, and reactive to light. Neck:      Musculoskeletal: Neck supple. No muscular tenderness. Cardiovascular:      Rate and Rhythm: Normal rate and regular rhythm. Heart sounds: Normal heart sounds. No murmur. Pulmonary:      Effort: Pulmonary effort is normal.      Breath sounds: Normal breath sounds. Lymphadenopathy:      Cervical: No cervical adenopathy. Skin:     General: Skin is warm and dry. Neurological:      Mental Status: She is alert and oriented to person, place, and time. Assessment/Plan    1. Conjunctivitis of left eye, unspecified conjunctivitis type  Advised follow up with opthamologist  Discussed emergency if extreme pain, change in vision  - olopatadine (PATADAY) 0.2 % SOLN ophthalmic solution; Place 1 drop into both eyes daily for 10 days  Dispense: 1 Bottle; Refill: 0    2. Allergic rhinitis, unspecified seasonality, unspecified trigger  Refill sent  - fluticasone (FLONASE) 50 MCG/ACT nasal spray; 2 sprays by Nasal route daily as needed (as needed)  Dispense: 1 Bottle; Refill: 2      Return if symptoms worsen or fail to improve, for unresolved symptoms. This dictation was generated by voice recognition computer software. Although all attempts are made to edit the dictation for accuracy, there may be errors in the transcription that are not intended.

## 2021-01-13 ENCOUNTER — OFFICE VISIT (OUTPATIENT)
Dept: FAMILY MEDICINE CLINIC | Age: 81
End: 2021-01-13
Payer: MEDICARE

## 2021-01-13 VITALS
TEMPERATURE: 97.3 F | BODY MASS INDEX: 35.71 KG/M2 | WEIGHT: 189 LBS | SYSTOLIC BLOOD PRESSURE: 136 MMHG | DIASTOLIC BLOOD PRESSURE: 84 MMHG

## 2021-01-13 DIAGNOSIS — J41.8 MIXED SIMPLE AND MUCOPURULENT CHRONIC BRONCHITIS (HCC): ICD-10-CM

## 2021-01-13 DIAGNOSIS — E78.00 PURE HYPERCHOLESTEROLEMIA: ICD-10-CM

## 2021-01-13 DIAGNOSIS — K21.9 GASTROESOPHAGEAL REFLUX DISEASE WITHOUT ESOPHAGITIS: ICD-10-CM

## 2021-01-13 DIAGNOSIS — G62.9 PERIPHERAL POLYNEUROPATHY: ICD-10-CM

## 2021-01-13 DIAGNOSIS — I10 ESSENTIAL HYPERTENSION: ICD-10-CM

## 2021-01-13 DIAGNOSIS — E03.9 ACQUIRED HYPOTHYROIDISM: Primary | ICD-10-CM

## 2021-01-13 PROCEDURE — G8399 PT W/DXA RESULTS DOCUMENT: HCPCS | Performed by: FAMILY MEDICINE

## 2021-01-13 PROCEDURE — 4040F PNEUMOC VAC/ADMIN/RCVD: CPT | Performed by: FAMILY MEDICINE

## 2021-01-13 PROCEDURE — 1123F ACP DISCUSS/DSCN MKR DOCD: CPT | Performed by: FAMILY MEDICINE

## 2021-01-13 PROCEDURE — G8482 FLU IMMUNIZE ORDER/ADMIN: HCPCS | Performed by: FAMILY MEDICINE

## 2021-01-13 PROCEDURE — 99214 OFFICE O/P EST MOD 30 MIN: CPT | Performed by: FAMILY MEDICINE

## 2021-01-13 PROCEDURE — G8417 CALC BMI ABV UP PARAM F/U: HCPCS | Performed by: FAMILY MEDICINE

## 2021-01-13 PROCEDURE — G8926 SPIRO NO PERF OR DOC: HCPCS | Performed by: FAMILY MEDICINE

## 2021-01-13 PROCEDURE — G8427 DOCREV CUR MEDS BY ELIG CLIN: HCPCS | Performed by: FAMILY MEDICINE

## 2021-01-13 PROCEDURE — 1036F TOBACCO NON-USER: CPT | Performed by: FAMILY MEDICINE

## 2021-01-13 PROCEDURE — 3023F SPIROM DOC REV: CPT | Performed by: FAMILY MEDICINE

## 2021-01-13 PROCEDURE — 1090F PRES/ABSN URINE INCON ASSESS: CPT | Performed by: FAMILY MEDICINE

## 2021-01-13 RX ORDER — OMEPRAZOLE 20 MG/1
20 CAPSULE, DELAYED RELEASE ORAL
Qty: 90 CAPSULE | Refills: 2 | Status: SHIPPED | OUTPATIENT
Start: 2021-01-13 | End: 2021-09-24

## 2021-01-13 ASSESSMENT — ENCOUNTER SYMPTOMS
CHEST TIGHTNESS: 1
CONSTIPATION: 0
BACK PAIN: 0
SHORTNESS OF BREATH: 1
DIARRHEA: 0
RHINORRHEA: 0

## 2021-01-13 ASSESSMENT — COPD QUESTIONNAIRES: COPD: 1

## 2021-01-13 NOTE — PROGRESS NOTES
SUBJECTIVE:    Keerthi Stokes is a 68 y.o. female who presents for a follow up visit. Chief Complaint   Patient presents with    Follow-up     Patient is here for a follow up. Discuss lab. COPD  She complains of chest tightness and shortness of breath. This is a chronic problem. The current episode started more than 1 year ago. The problem has been waxing and waning. Associated symptoms include dyspnea on exertion and myalgias ( lower extremity neuropathy). Pertinent negatives include no chest pain, orthopnea, postnasal drip or rhinorrhea. Her symptoms are aggravated by URI and strenuous activity. Her symptoms are alleviated by steroid inhaler, ipratropium and beta-agonist. Her past medical history is significant for COPD. Hyperlipidemia  This is a chronic problem. The current episode started more than 1 year ago. Lipid results: Total cholesterol 141, triglycerides 78, HDL 47, LDL 79. Associated symptoms include myalgias ( lower extremity neuropathy) and shortness of breath. Pertinent negatives include no chest pain. Current antihyperlipidemic treatment includes statins. The current treatment provides significant improvement of lipids. Compliance problems include adherence to exercise. Risk factors for coronary artery disease include hypertension, a sedentary lifestyle, post-menopausal, dyslipidemia and obesity. Hypertension  This is a chronic problem. The current episode started more than 1 year ago. The problem is controlled. Associated symptoms include shortness of breath. Pertinent negatives include no chest pain or palpitations. Past treatments include angiotensin blockers and diuretics. The current treatment provides significant improvement. Compliance problems include exercise and diet. There is no history of CAD/MI. Hypothyroidism  This is a chronic problem. Her current dose of Synthroid is 100 mcg daily.   Most recent TSH was 1.860    Lab Review: CMP was essentially normal, glucose 106, bilirubin 1.6, LFTs normal.  Hemoglobin A1c 5.7      Patient's medications, allergies, past medical,surgical, social and family histories were reviewed and updated as appropriate. Past Medical History:   Diagnosis Date    Arthritis     Asthma     [facility]ch[facility]) ([facility]     Diabetes mellitus ([facility]     Hyperlipidemia     Hypertension     Influenza 2018    Thyroid disease      Past Surgical History:   Procedure Laterality Date    COLONOSCOPY       Family History   Problem Relation Age of Onset    Heart Disease Mother     High Blood Pressure Mother     Stroke Mother     Heart Disease Sister     High Blood Pressure Sister     Cancer Brother         lung     Social History     Tobacco Use    Smoking status: Former Smoker     Packs/day: 1.00     Years: 10.00     Pack years: 10.00     Types: Cigarettes     Quit date: 10/5/1998     Years since quittin.2    Smokeless tobacco: Never Used    Tobacco comment: was only smoking socially on and off   Substance Use Topics    Alcohol use: No     Alcohol/week: 0.0 standard drinks      Allergies   Allergen Reactions    Aspirin      Recurrent GI blood Loss?  Sulfa Antibiotics      Other reaction(s):  Other (See Comments)  hallucinations  hallucinations    Ace Inhibitors Palpitations     Swelling and cough  cough     Current Outpatient Medications on File Prior to Visit   Medication Sig Dispense Refill    fluticasone (FLONASE) 50 MCG/ACT nasal spray 2 sprays by Nasal route daily as needed (as needed) 1 Bottle 2    albuterol sulfate  (90 Base) MCG/ACT inhaler INHALE 2 PUFFS INTO LUNGS EVERY 6 HOURS AS NEEDED FOR SHORTNESS OF BREATH OR WHEEZING 8.5 g 5    atorvastatin (LIPITOR) 20 MG tablet Take 1 tablet by mouth daily 90 tablet 1    levothyroxine (SYNTHROID) 100 MCG tablet One daily 90 tablet 1  valsartan-hydrochlorothiazide (DIOVAN-HCT) 80-12.5 MG per tablet Take 1 tablet by mouth daily 90 tablet 3    ipratropium-albuterol (DUONEB) 0.5-2.5 (3) MG/3ML SOLN nebulizer solution USE 1 VIAL VIA NEBULIZER EVERY 6 HOURS 360 vial 3    tiotropium (SPIRIVA HANDIHALER) 18 MCG inhalation capsule Inhale 1 capsule into the lungs daily 30 capsule 6    cetirizine (ZYRTEC) 10 MG tablet Take 10 mg by mouth daily      acetaminophen (TYLENOL) 500 MG tablet Take 1,000 mg by mouth daily as needed        No current facility-administered medications on file prior to visit. Review of Systems   Constitutional: Negative for activity change, fatigue and unexpected weight change. HENT: Negative for congestion, postnasal drip and rhinorrhea. Respiratory: Positive for chest tightness and shortness of breath. Cardiovascular: Positive for dyspnea on exertion. Negative for chest pain and palpitations. Gastrointestinal: Negative for constipation and diarrhea. Genitourinary: Negative for difficulty urinating. Musculoskeletal: Positive for arthralgias (hands) and myalgias ( lower extremity neuropathy). Negative for back pain. Neurological: Negative for dizziness and light-headedness. Psychiatric/Behavioral: Negative for dysphoric mood and sleep disturbance. The patient is not nervous/anxious. OBJECTIVE:    /84   Temp 97.3 °F (36.3 °C)   Wt 189 lb (85.7 kg)   BMI 35.71 kg/m²    Vitals:    01/13/21 1133 01/13/21 1155   BP: (!) 140/80 136/84   Temp: 97.3 °F (36.3 °C)    Weight: 189 lb (85.7 kg)        Physical Exam  Constitutional:       Appearance: She is well-developed. She is obese. HENT:      Head: Normocephalic and atraumatic. Right Ear: Tympanic membrane and external ear normal.      Left Ear: Tympanic membrane and external ear normal.      Nose: Nose normal.      Mouth/Throat:      Mouth: Mucous membranes are moist.      Pharynx: No posterior oropharyngeal erythema.    Eyes: General:         Right eye: No discharge. Conjunctiva/sclera: Conjunctivae normal.   Neck:      Musculoskeletal: Normal range of motion and neck supple. Thyroid: No thyromegaly. Vascular: No JVD. Trachea: No tracheal deviation. Cardiovascular:      Rate and Rhythm: Normal rate and regular rhythm. Heart sounds: Normal heart sounds. Pulmonary:      Effort: Pulmonary effort is normal. No respiratory distress. Breath sounds: Normal breath sounds. No rales. Musculoskeletal:      Right lower leg: No edema. Left lower leg: No edema. Lymphadenopathy:      Cervical: No cervical adenopathy. Skin:     General: Skin is warm and dry. Neurological:      Mental Status: She is alert and oriented to person, place, and time. Psychiatric:         Mood and Affect: Mood normal.         Behavior: Behavior normal.         ASSESSMENT/PLAN:    Robyn Leal was seen today for follow-up. Diagnoses and all orders for this visit:    Acquired hypothyroidism  Therapeutic level on current dose of Synthroid. Continue 100 mcg daily    Gastroesophageal reflux disease without esophagitis  -     omeprazole (PRILOSEC) 20 MG delayed release capsule; Take 1 capsule by mouth every morning (before breakfast)    Essential hypertension  In fairly good control on current medications    Pure hypercholesterolemia  -     Comprehensive Metabolic Panel, Fasting; Future  -     CBC Auto Differential; Future  -     Lipid, Fasting; Future    Mixed simple and mucopurulent chronic bronchitis (HCC)  -     fluticasone-salmeterol (ADVAIR) 250-50 MCG/DOSE AEPB; Inhale 1 puff into the lungs every 12 hours    Peripheral polyneuropathy  -     Vitamin B12 & Folate; Future        Return in about 6 months (around 7/13/2021). Please note portions of this note were completed with a voicerecognition program.  Efforts were made to edit the dictations but occasionally words are mis-transcribed.

## 2021-03-13 DIAGNOSIS — E03.9 ACQUIRED HYPOTHYROIDISM: ICD-10-CM

## 2021-03-15 RX ORDER — LEVOTHYROXINE SODIUM 0.1 MG/1
TABLET ORAL
Qty: 90 TABLET | Refills: 1 | Status: SHIPPED | OUTPATIENT
Start: 2021-03-15 | End: 2021-09-13

## 2021-07-12 ASSESSMENT — COPD QUESTIONNAIRES: COPD: 1

## 2021-07-12 ASSESSMENT — ENCOUNTER SYMPTOMS
HEARTBURN: 1
SHORTNESS OF BREATH: 1

## 2021-07-12 NOTE — PROGRESS NOTES
SUBJECTIVE:    Asiya Longo is a 80 y.o. female who presents for a follow up visit. Chief Complaint   Patient presents with    Follow-up     Patient presents for a follow-up on her diabetes, thyroid, and hypertension.  Diabetes        COPD  She complains of shortness of breath. This is a chronic problem. The current episode started more than 1 year ago. The problem has been waxing and waning. The cough is productive of sputum. Associated symptoms include dyspnea on exertion and heartburn. Pertinent negatives include no postnasal drip or rhinorrhea. Her symptoms are aggravated by strenuous activity, change in weather and URI. Her symptoms are alleviated by beta-agonist, steroid inhaler and ipratropium. She reports significant improvement on treatment. Her past medical history is significant for bronchitis and COPD. Hyperlipidemia  This is a chronic problem. The current episode started more than 1 year ago. Lipid results: Total cholesterol 138, triglycerides 76, HDL 46, LDL 77. Exacerbating diseases include hypothyroidism and obesity. Associated symptoms include shortness of breath. Current antihyperlipidemic treatment includes statins. The current treatment provides significant improvement of lipids. Compliance problems include adherence to exercise and adherence to diet. Risk factors for coronary artery disease include dyslipidemia, diabetes mellitus, hypertension and a sedentary lifestyle. Hypertension  This is a chronic problem. The current episode started more than 1 year ago. The problem is controlled. Associated symptoms include shortness of breath. Pertinent negatives include no palpitations. Risk factors for coronary artery disease include dyslipidemia, male gender, obesity, sedentary lifestyle and post-menopausal state. Past treatments include angiotensin blockers and diuretics. The current treatment provides significant improvement. Compliance problems include exercise and diet. Hypertensive end-organ damage includes CAD/MI. Gastroesophageal Reflux  She complains of heartburn and nausea. This is a chronic problem. The current episode started more than 1 year ago. The problem occurs occasionally. The problem has been gradually worsening. The heartburn is located in the substernum. The heartburn is of moderate intensity. The heartburn does not wake her from sleep. The heartburn does not limit her activity. The heartburn doesn't change with position. The symptoms are aggravated by certain foods. Risk factors include hiatal hernia (Patient apparently has a large hiatal hernia. She was recently hospitalized with an apparent gastric outlet obstruction. Things calmed down with conservative treatment but patient has been referred to general surgery for possible surgical repair. ). She has tried a PPI for the symptoms. The treatment provided moderate relief. Past procedures include a UGI. Patient's medications, allergies, past medical,surgical, social and family histories were reviewed and updated as appropriate.      Past Medical History:   Diagnosis Date    Arthritis     Asthma     [facility]     Diabetes mellitus ([facility]     Hyperlipidemia     Hypertension     Influenza 2018    Thyroid disease      Past Surgical History:   Procedure Laterality Date    COLONOSCOPY       Family History   Problem Relation Age of Onset    Heart Disease Mother     High Blood Pressure Mother     Stroke Mother     Heart Disease Sister     High Blood Pressure Sister     Cancer Brother         lung     Social History     Tobacco Use    Smoking status: Former Smoker     Packs/day: 1.00     Years: 10.00     Pack years: 10.00     Types: Cigarettes     Quit date: 10/5/1998     Years since quittin.7    Smokeless tobacco: Never Used    Tobacco comment: was only smoking socially on and off   Substance Use Topics    Alcohol use: No     Alcohol/week: 0.0 standard drinks      Allergies   Allergen Reactions    Aspirin      Recurrent GI blood Loss?  Sulfa Antibiotics      Other reaction(s): Other (See Comments)  hallucinations  hallucinations    Ace Inhibitors Palpitations     Swelling and cough  cough     Current Outpatient Medications on File Prior to Visit   Medication Sig Dispense Refill    atorvastatin (LIPITOR) 20 MG tablet TAKE 1 TABLET BY MOUTH DAILY 90 tablet 3    levothyroxine (SYNTHROID) 100 MCG tablet TAKE 1 TABLET BY MOUTH DAILY 90 tablet 1    fluticasone-salmeterol (ADVAIR) 250-50 MCG/DOSE AEPB Inhale 1 puff into the lungs every 12 hours 1 each 6    fluticasone (FLONASE) 50 MCG/ACT nasal spray 2 sprays by Nasal route daily as needed (as needed) 1 Bottle 2    albuterol sulfate  (90 Base) MCG/ACT inhaler INHALE 2 PUFFS INTO LUNGS EVERY 6 HOURS AS NEEDED FOR SHORTNESS OF BREATH OR WHEEZING 8.5 g 5    valsartan-hydrochlorothiazide (DIOVAN-HCT) 80-12.5 MG per tablet Take 1 tablet by mouth daily 90 tablet 3    ipratropium-albuterol (DUONEB) 0.5-2.5 (3) MG/3ML SOLN nebulizer solution USE 1 VIAL VIA NEBULIZER EVERY 6 HOURS 360 vial 3    tiotropium (SPIRIVA HANDIHALER) 18 MCG inhalation capsule Inhale 1 capsule into the lungs daily 30 capsule 6    cetirizine (ZYRTEC) 10 MG tablet Take 10 mg by mouth daily      acetaminophen (TYLENOL) 500 MG tablet Take 1,000 mg by mouth daily as needed       omeprazole (PRILOSEC) 20 MG delayed release capsule Take 1 capsule by mouth every morning (before breakfast) 90 capsule 2     No current facility-administered medications on file prior to visit. Review of Systems   Constitutional: Negative for activity change and unexpected weight change. HENT: Negative for congestion, postnasal drip and rhinorrhea. Respiratory: Positive for shortness of breath. Cardiovascular: Positive for dyspnea on exertion. Negative for palpitations.    Gastrointestinal: Positive for constipation ( now resolved), heartburn and nausea. Negative for diarrhea. Genitourinary: Negative for difficulty urinating. Neurological: Negative for dizziness and light-headedness. Psychiatric/Behavioral: Negative for dysphoric mood and sleep disturbance. The patient is not nervous/anxious. OBJECTIVE:    /80 (Site: Right Upper Arm, Position: Sitting, Cuff Size: Medium Adult)   Pulse 74   Temp 97.3 °F (36.3 °C) (Infrared)   Wt 183 lb 3.2 oz (83.1 kg)   SpO2 96%   BMI 34.62 kg/m²    Physical Exam  Constitutional:       Appearance: Normal appearance. She is obese. HENT:      Head: Normocephalic and atraumatic. Right Ear: Tympanic membrane normal.      Left Ear: Tympanic membrane normal.      Nose: Nose normal. No rhinorrhea. Mouth/Throat:      Mouth: Mucous membranes are moist.      Pharynx: No posterior oropharyngeal erythema. Cardiovascular:      Rate and Rhythm: Rhythm irregular. Heart sounds: Normal heart sounds. Pulmonary:      Effort: Pulmonary effort is normal.      Breath sounds: Normal breath sounds. Musculoskeletal:      Cervical back: Normal range of motion and neck supple. Right lower leg: No edema. Left lower leg: No edema. Skin:     General: Skin is warm and dry. Neurological:      General: No focal deficit present. Mental Status: She is alert and oriented to person, place, and time. Psychiatric:         Mood and Affect: Mood normal.         Behavior: Behavior normal.         ASSESSMENT/PLAN:    Pat Thorne was seen today for follow-up and diabetes. Diagnoses and all orders for this visit:    Gastroesophageal reflux disease without esophagitis  Recent hospitalization for possible outlet obstruction due to her large hiatal hernia    Multifocal atrial tachycardia ([address]  This is a chronic problem but recent hospitalization revealed no worrisome arrhythmias. Other vitamin B12 deficiency anemia  Most recent B12 level was low.  She is to start 1000 mcg tablet or capsule daily  -     Vitamin B12 & Folate; Future    Essential hypertension  -     CBC Auto Differential; Future  -     Comprehensive Metabolic Panel, Fasting; Future    Acquired hypothyroidism  -     TSH with Reflex; Future    Large hiatal hernia  Patient was [facility] at [facility] and then apparently transferred to another [facility]. Patient was given a referral to general surgeon but she would like to have a second opinion from a [facility] general surgeon. -     Sharron Cook MD, General Surgery, [facility]    Hyperglycemia  -     Hemoglobin A1C; Future    Pure hypercholesterolemia  -     Lipid, Fasting; Future        Return in about 4 months (around 11/13/2021). Please note portions of this note were completed with a voicerecognition program.  Efforts were made to edit the dictations but occasionally words are mis-transcribed.

## 2021-07-13 ENCOUNTER — OFFICE VISIT (OUTPATIENT)
Dept: FAMILY MEDICINE CLINIC | Age: 81
End: 2021-07-13
Payer: MEDICARE

## 2021-07-13 VITALS
WEIGHT: 183.2 LBS | HEART RATE: 74 BPM | OXYGEN SATURATION: 96 % | DIASTOLIC BLOOD PRESSURE: 80 MMHG | TEMPERATURE: 97.3 F | SYSTOLIC BLOOD PRESSURE: 110 MMHG | BODY MASS INDEX: 34.62 KG/M2

## 2021-07-13 DIAGNOSIS — D51.8 OTHER VITAMIN B12 DEFICIENCY ANEMIA: ICD-10-CM

## 2021-07-13 DIAGNOSIS — J41.8 MIXED SIMPLE AND MUCOPURULENT CHRONIC BRONCHITIS (HCC): ICD-10-CM

## 2021-07-13 DIAGNOSIS — K44.9 LARGE HIATAL HERNIA: ICD-10-CM

## 2021-07-13 DIAGNOSIS — I10 ESSENTIAL HYPERTENSION: ICD-10-CM

## 2021-07-13 DIAGNOSIS — K21.9 GASTROESOPHAGEAL REFLUX DISEASE WITHOUT ESOPHAGITIS: Primary | ICD-10-CM

## 2021-07-13 DIAGNOSIS — I47.1 MULTIFOCAL ATRIAL TACHYCARDIA (HCC): ICD-10-CM

## 2021-07-13 DIAGNOSIS — E03.9 ACQUIRED HYPOTHYROIDISM: ICD-10-CM

## 2021-07-13 DIAGNOSIS — R73.9 HYPERGLYCEMIA: ICD-10-CM

## 2021-07-13 DIAGNOSIS — E78.00 PURE HYPERCHOLESTEROLEMIA: ICD-10-CM

## 2021-07-13 PROCEDURE — 3023F SPIROM DOC REV: CPT | Performed by: FAMILY MEDICINE

## 2021-07-13 PROCEDURE — 1123F ACP DISCUSS/DSCN MKR DOCD: CPT | Performed by: FAMILY MEDICINE

## 2021-07-13 PROCEDURE — G8427 DOCREV CUR MEDS BY ELIG CLIN: HCPCS | Performed by: FAMILY MEDICINE

## 2021-07-13 PROCEDURE — G8417 CALC BMI ABV UP PARAM F/U: HCPCS | Performed by: FAMILY MEDICINE

## 2021-07-13 PROCEDURE — G8926 SPIRO NO PERF OR DOC: HCPCS | Performed by: FAMILY MEDICINE

## 2021-07-13 PROCEDURE — 1090F PRES/ABSN URINE INCON ASSESS: CPT | Performed by: FAMILY MEDICINE

## 2021-07-13 PROCEDURE — 3288F FALL RISK ASSESSMENT DOCD: CPT | Performed by: FAMILY MEDICINE

## 2021-07-13 PROCEDURE — G8399 PT W/DXA RESULTS DOCUMENT: HCPCS | Performed by: FAMILY MEDICINE

## 2021-07-13 PROCEDURE — 1036F TOBACCO NON-USER: CPT | Performed by: FAMILY MEDICINE

## 2021-07-13 PROCEDURE — 99214 OFFICE O/P EST MOD 30 MIN: CPT | Performed by: FAMILY MEDICINE

## 2021-07-13 PROCEDURE — 4040F PNEUMOC VAC/ADMIN/RCVD: CPT | Performed by: FAMILY MEDICINE

## 2021-07-13 ASSESSMENT — PATIENT HEALTH QUESTIONNAIRE - PHQ9
SUM OF ALL RESPONSES TO PHQ QUESTIONS 1-9: 0
1. LITTLE INTEREST OR PLEASURE IN DOING THINGS: 0
SUM OF ALL RESPONSES TO PHQ9 QUESTIONS 1 & 2: 0
SUM OF ALL RESPONSES TO PHQ QUESTIONS 1-9: 0
2. FEELING DOWN, DEPRESSED OR HOPELESS: 0
SUM OF ALL RESPONSES TO PHQ QUESTIONS 1-9: 0

## 2021-07-13 ASSESSMENT — ENCOUNTER SYMPTOMS
DIARRHEA: 0
RHINORRHEA: 0
NAUSEA: 1
CONSTIPATION: 1

## 2021-07-27 ENCOUNTER — OFFICE VISIT (OUTPATIENT)
Dept: SURGERY | Age: 81
End: 2021-07-27
Payer: MEDICARE

## 2021-07-27 VITALS — BODY MASS INDEX: 34.96 KG/M2 | DIASTOLIC BLOOD PRESSURE: 80 MMHG | SYSTOLIC BLOOD PRESSURE: 110 MMHG | WEIGHT: 185 LBS

## 2021-07-27 DIAGNOSIS — K21.9 HIATAL HERNIA WITH GERD: Primary | ICD-10-CM

## 2021-07-27 DIAGNOSIS — K44.9 HIATAL HERNIA WITH GERD: Primary | ICD-10-CM

## 2021-07-27 PROCEDURE — G8427 DOCREV CUR MEDS BY ELIG CLIN: HCPCS | Performed by: SURGERY

## 2021-07-27 PROCEDURE — 99203 OFFICE O/P NEW LOW 30 MIN: CPT | Performed by: SURGERY

## 2021-07-27 PROCEDURE — G8417 CALC BMI ABV UP PARAM F/U: HCPCS | Performed by: SURGERY

## 2021-07-27 PROCEDURE — 1090F PRES/ABSN URINE INCON ASSESS: CPT | Performed by: SURGERY

## 2021-07-27 ASSESSMENT — ENCOUNTER SYMPTOMS
GASTROINTESTINAL NEGATIVE: 1
EYES NEGATIVE: 1
ALLERGIC/IMMUNOLOGIC NEGATIVE: 1
RESPIRATORY NEGATIVE: 1

## 2021-07-27 NOTE — PROGRESS NOTES
[facility] GENERAL AND LAPAROSCOPIC SURGERY                       PATIENT NAME: Marie Fletcher        TODAY'S DATE: 7/27/2021    Reason for Consult:  Hiatal Marcellina Pain    Requesting Physician:  Dr. Trudy Rouse:              The patient is a 80 y.o. female who presents with concerns of a hiatal hernia, has had severe sx's with GERD, regurgitation, and obstruction. Seen and treated thru [facility] initially, Dc'd and returns for further care. No prior gastric surgery, hernia present for years. No acute GIB noted. Has seen Dr. Maria Isabel Pang in the distant past. Had CT and UGI, no recent EGD. Past Medical History:        Diagnosis Date    Arthritis     Asthma     [facility]     Diabetes mellitus ([facility]     Hyperlipidemia     Hypertension     Influenza 02/08/2018    Thyroid disease        Past Surgical History:        Procedure Laterality Date    COLONOSCOPY         Current Medications:   No current facility-administered medications for this visit. Prior to Admission medications    Medication Sig Start Date End Date Taking?  Authorizing Provider   atorvastatin (LIPITOR) 20 MG tablet TAKE 1 TABLET BY MOUTH DAILY 6/21/21  Yes Marita Florence, APRN - CNP   levothyroxine (SYNTHROID) 100 MCG tablet TAKE 1 TABLET BY MOUTH DAILY 3/15/21  Yes Wesley Rao MD   fluticasone-salmeterol (ADVAIR) 250-50 MCG/DOSE AEPB Inhale 1 puff into the lungs every 12 hours 1/13/21  Yes Wesley Rao MD   fluticasone [facility]) 50 MCG/ACT nasal spray 2 sprays by Nasal route daily as needed (as needed) 12/15/20  Yes OPAL Steiner - NP   albuterol sulfate  (90 Base) MCG/ACT inhaler INHALE 2 PUFFS INTO LUNGS EVERY 6 HOURS AS NEEDED FOR SHORTNESS OF BREATH OR WHEEZING 12/8/20  Yes Wesley Rao MD   valsartan-hydrochlorothiazide (DIOVAN-HCT) 80-12.5 MG per tablet Take 1 tablet by mouth daily 7/9/20  Yes Eboni Casiano Alex Isidro MD   ipratropium-albuterol (DUONEB) 0.5-2.5 (3) MG/3ML SOLN nebulizer solution USE 1 VIAL VIA NEBULIZER EVERY 6 HOURS 9/27/19  Yes Sandra Salmeron MD   tiotropium (SPIRIVA HANDIHALER) 18 MCG inhalation capsule Inhale 1 capsule into the lungs daily 10/5/18  Yes Preet Jon MD   cetirizine (ZYRTEC) 10 MG tablet Take 10 mg by mouth daily   Yes Historical Provider, MD   acetaminophen (TYLENOL) 500 MG tablet Take 1,000 mg by mouth daily as needed    Yes Historical Provider, MD   omeprazole (PRILOSEC) 20 MG delayed release capsule Take 1 capsule by mouth every morning (before breakfast) 1/13/21 4/13/21  Sandra Salmeron MD        Allergies:  Aspirin, Sulfa antibiotics, and Ace inhibitors    Social History:    reports that she quit smoking about 22 years ago. Her smoking use included cigarettes. She has a 10.00 pack-year smoking history. She has never used smokeless tobacco. She reports that she does not drink alcohol and does not use drugs. Family History:        Problem Relation Age of Onset    Heart Disease Mother     High Blood Pressure Mother     Stroke Mother     Heart Disease Sister     High Blood Pressure Sister     Cancer Brother         lung       REVIEW OF SYSTEMS:  Review of Systems   Constitutional: Negative. HENT: Negative. Eyes: Negative. Respiratory: Negative. Cardiovascular: Negative. Gastrointestinal: Negative. Endocrine: Negative. Genitourinary: Negative. Musculoskeletal: Negative. Skin: Negative. Allergic/Immunologic: Negative. Neurological: Negative. Hematological: Negative. Psychiatric/Behavioral: Negative.         PHYSICAL EXAM:  VITALS:  Wt 185 lb (83.9 kg)   BMI 34.96 kg/m²   CONSTITUTIONAL:  alert, no apparent distress and mildly obese  EYES:  sclera clear  ENT:  normocepalic, without obvious abnormality  NECK:  supple, symmetrical, trachea midline   LUNGS:  clear to auscultation  CARDIOVASCULAR:  regular rate and rhythm and no murmur noted  ABDOMEN:  no scars, normal bowel sounds, soft, non-distended, non-tender, voluntary guarding absent, no masses palpated, no hepatosplenomegally and hernia absent  MUSCULOSKELETAL:  0+ pitting edema lower extremities  NEUROLOGIC:  Mental Status Exam:  Level of Alertness:   awake  Orientation:   person, place, time  SKIN:  no bruising or bleeding, normal skin color, texture, turgor and no redness, warmth, or swelling        Radiology Review:      Delayed gastric emptying is identified. This appears to be related to a combination of markedly decreased peristalsis as well as some possible mass effect/compression of the gastric pylorus related to the patient's large hiatal hernia. Large hiatal hernia again demonstrated. The fundus as well as the majority of the body of the stomach are contained within the hernia. Portions of the body as well as the gastric antrum and pylorus remains subdiaphragmatic. Narrative    HISTORY:  eval hiatal hernia  eval hiatal hernia   COMPARISON: 6/28/2021, 6/27/2021   FLUOROSCOPY TIME: 2 minutes 55 seconds   TECHNIQUE:  Contrast was administered with fluoroscopic visualization and spot images   NOTE:  If there are questions about the content of this report, please contact [address] [place] [address] radiology by calling [phone]     FINDINGS:     HYPOPHARYNX:  No diverticulum, narrowing, or leak   ESOPHAGUS:  Decreased peristalsis involving the distal third of the esophagus and occasional tertiary contractions noted. Findings compatible with mild esophageal dysmotility involving the distal third of the esophagus. No mucosal irregularity,   narrowing, diverticulum, leak or obstruction   STOMACH:  Nasogastric tube present, tip located within the fundus of the stomach. Large hiatal hernia is again identified. This contains the fundus as well as a large portion of the body of the stomach. Portions of the body as well as the gastric antrum   remain below the diaphragm. Pylorus and duodenum remain subdiaphragmatic. Delayed gastric emptying is noted. This appears to be related to a combination of markedly decreased peristalsis and possibly some mild mass effect/compression of the gastric   pylorus. PROXIMAL SMALL BOWEL:  Small bowel normal in caliber and appearance. BARIUM TABLET:  Not administered   OTHER: None      Large hiatal hernia with compression of the gastric pylorus/proximal duodenum, suggestive of gastric outlet obstruction. Large cystic lesion posterior to the uterus measures up to 6.8 cm and is favored to arise from the left ovary. This is incompletely evaluated by CT. A nonemergent pelvic ultrasound could be considered for further evaluation. Suggestion of hepatic steatosis. Cholelithiasis. These results were discussed with Duke Hogan at 8:58 PM.  Narrative    HISTORY:   Bowel obstruction, high-grade pain fever   COMPARISON:  None   TECHNIQUE: Post IV contrast multiplanar CT images of the abdomen and pelvis   NOTE:  If there are questions about the content of this report, please contact [address] [place] [address] radiology by calling [phone]     FINDINGS:   LOWER CHEST:  A large hiatal hernia is noted. Of note, there is significant compression of the gastric pylorus/proximal duodenum on axial image 12 of series 904, which is suggestive of gastric outlet obstruction. Prominent coronary calcifications. LIVER: Dewitte Peto is suggestion of mild hepatic steatosis. GALLBLADDER/BILE DUCTS:  Gallstones in the gallbladder without gallbladder distention, wall thickening, or pericholecystic fluid   PANCREAS:  Unremarkable.  No mass or duct dilation   SPLEEN:  Unremarkable   ADRENALS:  Unremarkable     KIDNEYS/URETERS:  Unremarkable.  No hydronephrosis, stone, or suspicious mass   GI TRACT:  Large hiatal hernia as described. No additional significant abnormality in the remaining loops of small and large bowel.    VESSELS:  Moderate atherosclerotic calcifications without aneurysm or dissection   LYMPH NODES: Unremarkable.  No enlarged lymph nodes   ABD WALL:  Unremarkable   PELVIS:  A large cystic lesion posterior to the uterus is up to 6.8 cm and is favored to arise from the left ovary. BONES:  Chronic degenerative changes of the spine. Scattered hemangiomas. OTHER:  None  Other Result Text    Interface, Rad Results In - 06/27/2021  9:02 PM EDT   Formatting of this note might be different from the original.   HISTORY:   Bowel obstruction, high-grade pain fever   COMPARISON:  None   TECHNIQUE: Post IV contrast multiplanar CT images of the abdomen and pelvis   NOTE:  If there are questions about the content of this report, please contact [address] [place] [address] radiology by calling [phone]     FINDINGS:   LOWER CHEST:  A large hiatal hernia is noted. Of note, there is significant compression of the gastric pylorus/proximal duodenum on axial image 12 of series 904, which is suggestive of gastric outlet obstruction. Prominent coronary calcifications. LIVER: Orvilla Leaven is suggestion of mild hepatic steatosis. GALLBLADDER/BILE DUCTS:  Gallstones in the gallbladder without gallbladder distention, wall thickening, or pericholecystic fluid   PANCREAS:  Unremarkable.  No mass or duct dilation   SPLEEN:  Unremarkable   ADRENALS:  Unremarkable     KIDNEYS/URETERS:  Unremarkable.  No hydronephrosis, stone, or suspicious mass    GI TRACT:  Large hiatal hernia as described. No additional significant abnormality in the remaining loops of small and large bowel. VESSELS:  Moderate atherosclerotic calcifications without aneurysm or dissection   LYMPH NODES: Unremarkable.  No enlarged lymph nodes   ABD WALL:  Unremarkable   PELVIS:  A large cystic lesion posterior to the uterus is up to 6.8 cm and is favored to arise from the left ovary. BONES:  Chronic degenerative changes of the spine. Scattered hemangiomas.    OTHER:  None     IMPRESSION       Large hiatal hernia with compression of the gastric pylorus/proximal duodenum, suggestive of gastric outlet obstruction. Large cystic lesion posterior to the uterus measures up to 6.8 cm and is favored to arise from the left ovary. This is incompletely evaluated by CT. A nonemergent pelvic ultrasound could be considered for further evaluation. Suggestion of hepatic steatosis. Cholelithiasis. These results were discussed with Tali Segura at 8:58 PM.     Date: 06/27/21   Received From: [facility]     Encounter Summary           IMPRESSION/RECOMMENDATIONS:    Large hiatal hernia, intrathoracic stomach with acute on chronic symptomatology  Will benefit from repair  Will refer to Dr. Uma Johnson for preop EGD and then plan to set up surgery     The problem and plan were discussed in detail with the patient today. All questions have been answered, and they agree to proceed.     Thank you,      Yanira Galan MD

## 2021-08-26 ENCOUNTER — HOSPITAL ENCOUNTER (OUTPATIENT)
Dept: ENDOSCOPY | Age: 81
Discharge: HOME | End: 2021-08-26
Payer: MEDICARE

## 2021-08-26 PROCEDURE — 3609015500 HC GASTRIC/DUODENAL MOTILITY &/OR MANOMETRY STUDY

## 2021-08-26 NOTE — PROGRESS NOTES
Time in Room: 1242   Patient verbalized understanding of PH/Esophageal manometry study. Vitals taken. Probe inserted into left nostril with no resistance. Study completed. Discharge instructions given. Patient denied further questions, nausea or pain. Walked to exit by this RN.   Time out of Room: 1322

## 2021-09-02 ENCOUNTER — HOSPITAL ENCOUNTER (OUTPATIENT)
Age: 81
Setting detail: OUTPATIENT SURGERY
Discharge: HOME | End: 2021-09-02
Attending: INTERNAL MEDICINE | Admitting: INTERNAL MEDICINE
Payer: MEDICARE

## 2021-09-02 ENCOUNTER — ANESTHESIA EVENT (OUTPATIENT)
Dept: ENDOSCOPY | Age: 81
End: 2021-09-02
Payer: MEDICARE

## 2021-09-02 ENCOUNTER — ANESTHESIA (OUTPATIENT)
Dept: ENDOSCOPY | Age: 81
End: 2021-09-02
Payer: MEDICARE

## 2021-09-02 VITALS
HEART RATE: 81 BPM | WEIGHT: 183 LBS | RESPIRATION RATE: 20 BRPM | DIASTOLIC BLOOD PRESSURE: 62 MMHG | BODY MASS INDEX: 34.55 KG/M2 | OXYGEN SATURATION: 94 % | SYSTOLIC BLOOD PRESSURE: 98 MMHG | TEMPERATURE: 97.9 F | HEIGHT: 61 IN

## 2021-09-02 VITALS
RESPIRATION RATE: 33 BRPM | DIASTOLIC BLOOD PRESSURE: 60 MMHG | OXYGEN SATURATION: 92 % | SYSTOLIC BLOOD PRESSURE: 117 MMHG

## 2021-09-02 LAB
GLUCOSE BLD-MCNC: 102 MG/DL (ref 70–99)
GLUCOSE BLD-MCNC: 99 MG/DL (ref 70–99)
PERFORMED ON: ABNORMAL
PERFORMED ON: NORMAL

## 2021-09-02 PROCEDURE — 2709999900 HC NON-CHARGEABLE SUPPLY: Performed by: INTERNAL MEDICINE

## 2021-09-02 PROCEDURE — 2580000003 HC RX 258: Performed by: NURSE ANESTHETIST, CERTIFIED REGISTERED

## 2021-09-02 PROCEDURE — 6360000002 HC RX W HCPCS: Performed by: NURSE ANESTHETIST, CERTIFIED REGISTERED

## 2021-09-02 PROCEDURE — 7100000011 HC PHASE II RECOVERY - ADDTL 15 MIN: Performed by: INTERNAL MEDICINE

## 2021-09-02 PROCEDURE — 7100000010 HC PHASE II RECOVERY - FIRST 15 MIN: Performed by: INTERNAL MEDICINE

## 2021-09-02 PROCEDURE — 6360000002 HC RX W HCPCS: Performed by: ANESTHESIOLOGY

## 2021-09-02 PROCEDURE — 3609017100 HC EGD: Performed by: INTERNAL MEDICINE

## 2021-09-02 PROCEDURE — 2580000003 HC RX 258: Performed by: ANESTHESIOLOGY

## 2021-09-02 PROCEDURE — 3700000000 HC ANESTHESIA ATTENDED CARE: Performed by: INTERNAL MEDICINE

## 2021-09-02 PROCEDURE — 3700000001 HC ADD 15 MINUTES (ANESTHESIA): Performed by: INTERNAL MEDICINE

## 2021-09-02 PROCEDURE — 2500000003 HC RX 250 WO HCPCS: Performed by: NURSE ANESTHETIST, CERTIFIED REGISTERED

## 2021-09-02 RX ORDER — SODIUM CHLORIDE 9 MG/ML
INJECTION, SOLUTION INTRAVENOUS CONTINUOUS
Status: DISCONTINUED | OUTPATIENT
Start: 2021-09-02 | End: 2021-09-02 | Stop reason: HOSPADM

## 2021-09-02 RX ORDER — SODIUM CHLORIDE 9 MG/ML
INJECTION, SOLUTION INTRAVENOUS CONTINUOUS PRN
Status: DISCONTINUED | OUTPATIENT
Start: 2021-09-02 | End: 2021-09-02 | Stop reason: SDUPTHER

## 2021-09-02 RX ORDER — PROPOFOL 10 MG/ML
INJECTION, EMULSION INTRAVENOUS PRN
Status: DISCONTINUED | OUTPATIENT
Start: 2021-09-02 | End: 2021-09-02 | Stop reason: SDUPTHER

## 2021-09-02 RX ORDER — ALBUTEROL SULFATE 2.5 MG/3ML
2.5 SOLUTION RESPIRATORY (INHALATION) ONCE
Status: COMPLETED | OUTPATIENT
Start: 2021-09-02 | End: 2021-09-02

## 2021-09-02 RX ORDER — KETAMINE HYDROCHLORIDE 10 MG/ML
INJECTION, SOLUTION INTRAMUSCULAR; INTRAVENOUS PRN
Status: DISCONTINUED | OUTPATIENT
Start: 2021-09-02 | End: 2021-09-02 | Stop reason: SDUPTHER

## 2021-09-02 RX ORDER — LIDOCAINE HYDROCHLORIDE 20 MG/ML
INJECTION, SOLUTION EPIDURAL; INFILTRATION; INTRACAUDAL; PERINEURAL PRN
Status: DISCONTINUED | OUTPATIENT
Start: 2021-09-02 | End: 2021-09-02 | Stop reason: SDUPTHER

## 2021-09-02 RX ADMIN — KETAMINE HYDROCHLORIDE 10 MG: 10 INJECTION, SOLUTION INTRAMUSCULAR; INTRAVENOUS at 09:48

## 2021-09-02 RX ADMIN — PROPOFOL 30 MG: 10 INJECTION, EMULSION INTRAVENOUS at 09:46

## 2021-09-02 RX ADMIN — PROPOFOL 20 MG: 10 INJECTION, EMULSION INTRAVENOUS at 09:45

## 2021-09-02 RX ADMIN — KETAMINE HYDROCHLORIDE 10 MG: 10 INJECTION, SOLUTION INTRAMUSCULAR; INTRAVENOUS at 09:44

## 2021-09-02 RX ADMIN — PROPOFOL 30 MG: 10 INJECTION, EMULSION INTRAVENOUS at 09:44

## 2021-09-02 RX ADMIN — PROPOFOL 30 MG: 10 INJECTION, EMULSION INTRAVENOUS at 09:42

## 2021-09-02 RX ADMIN — ALBUTEROL SULFATE 2.5 MG: 2.5 SOLUTION RESPIRATORY (INHALATION) at 09:29

## 2021-09-02 RX ADMIN — SODIUM CHLORIDE: 9 INJECTION, SOLUTION INTRAVENOUS at 09:40

## 2021-09-02 RX ADMIN — PROPOFOL 20 MG: 10 INJECTION, EMULSION INTRAVENOUS at 09:43

## 2021-09-02 RX ADMIN — PROPOFOL 30 MG: 10 INJECTION, EMULSION INTRAVENOUS at 09:48

## 2021-09-02 RX ADMIN — PROPOFOL 20 MG: 10 INJECTION, EMULSION INTRAVENOUS at 09:49

## 2021-09-02 RX ADMIN — LIDOCAINE HYDROCHLORIDE 60 MG: 20 INJECTION, SOLUTION EPIDURAL; INFILTRATION; INTRACAUDAL; PERINEURAL at 09:42

## 2021-09-02 RX ADMIN — SODIUM CHLORIDE: 9 INJECTION, SOLUTION INTRAVENOUS at 09:29

## 2021-09-02 NOTE — H&P
[address] [place] [address]   Pre-operative History and Physical    Patient: Rona Chang  : 1940  CSN:     History Obtained From: patient and/or guardian. HISTORY OF PRESENT ILLNESS:    The patient is a 80 y.o. female  here for EGD. Past Medical History:        Diagnosis Date    Arthritis     Asthma     [facility] [address] [facility]     Diabetes mellitus ([facility] [address] [facility]     Hyperlipidemia     Hypertension     Influenza 2018    Thyroid disease      Past Surgical History:        Procedure Laterality Date    COLONOSCOPY      ENDOSCOPY, COLON, DIAGNOSTIC       Medications Prior to Admission:   No current facility-administered medications on file prior to encounter.      Current Outpatient Medications on File Prior to Encounter   Medication Sig Dispense Refill    Cyanocobalamin (B-12 PO) Take by mouth daily      atorvastatin (LIPITOR) 20 MG tablet TAKE 1 TABLET BY MOUTH DAILY (Patient taking differently: Take 10 mg by mouth daily ) 90 tablet 3    levothyroxine (SYNTHROID) 100 MCG tablet TAKE 1 TABLET BY MOUTH DAILY 90 tablet 1    fluticasone-salmeterol (ADVAIR) 250-50 MCG/DOSE AEPB Inhale 1 puff into the lungs every 12 hours 1 each 6    omeprazole (PRILOSEC) 20 MG delayed release capsule Take 1 capsule by mouth every morning (before breakfast) 90 capsule 2    fluticasone (FLONASE) 50 MCG/ACT nasal spray 2 sprays by Nasal route daily as needed (as needed) 1 Bottle 2    albuterol sulfate  (90 Base) MCG/ACT inhaler INHALE 2 PUFFS INTO LUNGS EVERY 6 HOURS AS NEEDED FOR SHORTNESS OF BREATH OR WHEEZING 8.5 g 5    valsartan-hydrochlorothiazide (DIOVAN-HCT) 80-12.5 MG per tablet Take 1 tablet by mouth daily 90 tablet 3    ipratropium-albuterol (DUONEB) 0.5-2.5 (3) MG/3ML SOLN nebulizer solution USE 1 VIAL VIA NEBULIZER EVERY 6 HOURS 360 vial 3    tiotropium (SPIRIVA HANDIHALER) 18 MCG inhalation capsule Inhale 1 capsule into the lungs daily 30 capsule 6    cetirizine (ZYRTEC) 10 MG tablet Take 10 mg by mouth daily      acetaminophen (TYLENOL) 500 MG tablet Take 1,000 mg by mouth daily as needed        Allergies:  Aspirin, Sulfa antibiotics, and Ace inhibitors        Social History:   Social History     Tobacco Use    Smoking status: Former Smoker     Packs/day: 1.00     Years: 10.00     Pack years: 10.00     Types: Cigarettes     Quit date: 10/5/1998     Years since quittin.9    Smokeless tobacco: Never Used    Tobacco comment: was only smoking socially on and off   Substance Use Topics    Alcohol use: No     Alcohol/week: 0.0 standard drinks     Family History:   Family History   Problem Relation Age of Onset    Heart Disease Mother     High Blood Pressure Mother     Stroke Mother     Heart Disease Sister     High Blood Pressure Sister     Cancer Brother         lung       PHYSICAL EXAM:      /62   Pulse 91   Temp 98.4 °F (36.9 °C) (Temporal)   Resp 18   Ht 5' 1\" (1.549 m)   Wt 183 lb (83 kg)   SpO2 93%   BMI 34.58 kg/m²  I        Heart:  RRR,  Normal S1S2    Lungs:  CTA Bilat, normal effort    Abdomen:  ND NT      ASA Grade:  ASA 3 - Patient with moderate systemic disease with functional limitations    Mallampati Class:  Class I: Soft palate, uvula, fauces, pillars visible  __________  Class II: Soft palate, uvula, fauces visible  __________   Class III: Soft palate, base of uvula visible  __________  Class IV: Hard palate only visible   __________      ASSESSMENT AND PLAN:    1. Patient is a 80 y.o. female here for EGD with anesthesia  2. Procedure options, risks and benefits reviewed with patient. Patient expresses understanding.

## 2021-09-02 NOTE — ANESTHESIA POSTPROCEDURE EVALUATION
Department of Anesthesiology  Postprocedure Note    Patient: Barbara Pimentel  MRN: [id number]  YOB: 1940  Date of evaluation: 9/2/2021  Time:  10:51 AM     Procedure Summary     Date: 09/02/21 Room / Location: [address]    Anesthesia Start: 0940 Anesthesia Stop: 3929    Procedure: EGD DIAGNOSTIC ONLY (N/A Abdomen) Diagnosis:       (HIATAL HERNIA K44.9)      (Gastroesophageal reflux disease (GERD) K21.9)    Surgeons: Katerine Hall MD Responsible Provider: Julia Rodriguez MD    Anesthesia Type: MAC ASA Status: 3          Anesthesia Type: MAC    Jeremy Phase I: Jeremy Score: 10    Jeremy Phase II: Jeremy Score: 9    Last vitals: Reviewed and per EMR flowsheets.        Anesthesia Post Evaluation    Patient location during evaluation: PACU  Patient participation: complete - patient participated  Level of consciousness: awake  Airway patency: patent  Nausea & Vomiting: no vomiting  Complications: no  Cardiovascular status: hemodynamically stable  Respiratory status: acceptable  Hydration status: euvolemic

## 2021-09-02 NOTE — PROGRESS NOTES
Dr. Shimon Zarate spoke with patient and daughter at bedside about procedure.
Dressed and discharged to home with daughter. Has instructions, dentures in and all belongings.
Full dentures returned to patient's mouth. Discharge instructions reviewed with patient and daughter, signed and copy given. All questions answered and patient and/or responsible adult verbalizes understanding.
Received from Endo Procedure Room to Phase 2 Recovery. Drowsy, responds easily to verbal and tactile stimulation. Respirations easy on room air. VSS.
See anesthesia record for Propofol dosages and vital signs.
wait in the car will depend on the current policy and if social distancing can be maintained. The policy is subject to change at any time. Please make sure the visitor has a cell phone that is on,charged and able to accept calls, as this may be the way that the staff communicates with them. Pain management is NO VISITOR policyThe patients ride is expected to remain in the car with a cell phone for communication. If the ride is leaving the hospital grounds please make sure they are back in time for pickup. Have the patient inform the staff on arrival what their rides plans are while the patient is in the facility. At the MAIN there is one visitor allowed. Please note that the visitor policy is subject to change.

## 2021-09-02 NOTE — ANESTHESIA PRE PROCEDURE
Department of Anesthesiology  Preprocedure Note       Name:  Eder Jones   Age:  80 y.o.  :  1940                                          MRN:  [id number]         Date:  2021      Surgeon: Go Villegas):  Mike David MD    Procedure: Procedure(s):  EGD DIAGNOSTIC ONLY    Medications prior to admission:   Prior to Admission medications    Medication Sig Start Date End Date Taking?  Authorizing Provider   atorvastatin (LIPITOR) 20 MG tablet TAKE 1 TABLET BY MOUTH DAILY 21   OPAL Becerril CNP   levothyroxine (SYNTHROID) 100 MCG tablet TAKE 1 TABLET BY MOUTH DAILY 3/15/21   Nicolette Santacruz MD   fluticasone-salmeterol (ADVAIR) 250-50 MCG/DOSE AEPB Inhale 1 puff into the lungs every 12 hours 21   Nicolette Santacruz MD   omeprazole (PRILOSEC) 20 MG delayed release capsule Take 1 capsule by mouth every morning (before breakfast) 21  Nicolette Santacruz MD   fluticasone [facility]) 50 MCG/ACT nasal spray 2 sprays by Nasal route daily as needed (as needed) 12/15/20   OPAL Reyna - BI   albuterol sulfate  (90 Base) MCG/ACT inhaler INHALE 2 PUFFS INTO LUNGS EVERY 6 HOURS AS NEEDED FOR SHORTNESS OF BREATH OR WHEEZING 20   Nicolette Santacruz MD   valsartan-hydrochlorothiazide (DIOVAN-HCT) 80-12.5 MG per tablet Take 1 tablet by mouth daily 20   Nicolette Santacruz MD   ipratropium-albuterol (DUONEB) 0.5-2.5 (3) MG/3ML SOLN nebulizer solution USE 1 VIAL VIA NEBULIZER EVERY 6 HOURS 19   Nicolette Santacruz MD   tiotropium (SPIRIVA HANDIHALER) 18 MCG inhalation capsule Inhale 1 capsule into the lungs daily 10/5/18   Eunice Sifuentes MD   cetirizine (ZYRTEC) 10 MG tablet Take 10 mg by mouth daily    Historical Provider, MD   acetaminophen (TYLENOL) 500 MG tablet Take 1,000 mg by mouth daily as needed     Historical Provider, MD       Current medications:    No current facility-administered medications for this encounter. Current Outpatient Medications   Medication Sig Dispense Refill    atorvastatin (LIPITOR) 20 MG tablet TAKE 1 TABLET BY MOUTH DAILY 90 tablet 3    levothyroxine (SYNTHROID) 100 MCG tablet TAKE 1 TABLET BY MOUTH DAILY 90 tablet 1    fluticasone-salmeterol (ADVAIR) 250-50 MCG/DOSE AEPB Inhale 1 puff into the lungs every 12 hours 1 each 6    omeprazole (PRILOSEC) 20 MG delayed release capsule Take 1 capsule by mouth every morning (before breakfast) 90 capsule 2    fluticasone (FLONASE) 50 MCG/ACT nasal spray 2 sprays by Nasal route daily as needed (as needed) 1 Bottle 2    albuterol sulfate  (90 Base) MCG/ACT inhaler INHALE 2 PUFFS INTO LUNGS EVERY 6 HOURS AS NEEDED FOR SHORTNESS OF BREATH OR WHEEZING 8.5 g 5    valsartan-hydrochlorothiazide (DIOVAN-HCT) 80-12.5 MG per tablet Take 1 tablet by mouth daily 90 tablet 3    ipratropium-albuterol (DUONEB) 0.5-2.5 (3) MG/3ML SOLN nebulizer solution USE 1 VIAL VIA NEBULIZER EVERY 6 HOURS 360 vial 3    tiotropium (SPIRIVA HANDIHALER) 18 MCG inhalation capsule Inhale 1 capsule into the lungs daily 30 capsule 6    cetirizine (ZYRTEC) 10 MG tablet Take 10 mg by mouth daily      acetaminophen (TYLENOL) 500 MG tablet Take 1,000 mg by mouth daily as needed          Allergies: Allergies   Allergen Reactions    Aspirin      Recurrent GI blood Loss?  Sulfa Antibiotics      Other reaction(s):  Other (See Comments)  hallucinations  hallucinations    Ace Inhibitors Palpitations     Swelling and cough  cough       Problem List:    Patient Active Problem List   Diagnosis Code    Mixed simple and mucopurulent chronic bronchitis (HCC) J41.8    Generalized osteoarthrosis, involving multiple sites M15.9    Hypothyroidism E03.9    Essential hypertension I10    Anemia D64.9    Pure hypercholesterolemia E78.00    Other abnormal glucose R73.09    OA (osteoarthritis) of hip M16.9    Fatigue R53.83    RLS (restless legs syndrome) G25.81    4.2 06/15/2021    K 3.9 02/09/2018     06/15/2021    CO2 26 06/15/2021    BUN 11 06/15/2021    CREATININE 0.79 06/15/2021    GFRAA 82 06/15/2021    GFRAA >60 12/19/2011    AGRATIO 1.3 06/15/2021    LABGLOM 71 06/15/2021    GLUCOSE 100 06/15/2021    PROT 7.5 06/15/2021    CALCIUM 9.2 06/15/2021    BILITOT 1.4 06/15/2021    ALKPHOS 94 06/15/2021    AST 18 06/15/2021    ALT 7 06/15/2021       POC Tests: No results for input(s): POCGLU, POCNA, POCK, POCCL, POCBUN, POCHEMO, POCHCT in the last 72 hours. Coags:   Lab Results   Component Value Date    PROTIME 11.6 09/28/2020    INR 1.00 09/28/2020    APTT 30.3 02/08/2018       HCG (If Applicable): No results found for: PREGTESTUR, PREGSERUM, HCG, HCGQUANT     ABGs: No results found for: PHART, PO2ART, RST7LYD, VQN8PMP, BEART, A9GRASDI     Type & Screen (If Applicable):  No results found for: LABABO, LABRH    Drug/Infectious Status (If Applicable):  No results found for: HIV, HEPCAB    COVID-19 Screening (If Applicable): No results found for: COVID19        Anesthesia Evaluation  Patient summary reviewed no history of anesthetic complications:   Airway: Mallampati: II  TM distance: >3 FB   Neck ROM: full  Mouth opening: > = 3 FB Dental:    (+) upper dentures and lower dentures      Pulmonary: breath sounds clear to auscultation  (+) COPD:  decreased breath sounds,  asthma:                           ROS comment: Former smoker  Uses inhalers daily and proair 2-3 times per day. Last duoneb use was 1 mo ago. Cardiovascular:    (+) hypertension:, dysrhythmias (MAT):,         Rhythm: regular  Rate: normal                 ROS comment: Last Echo: 1/9/20   Summary   -Normal left ventricle size, wall thickness, and systolic function with an   estimated ejection fraction of 55%.  Grade I diastolic dysfunction with   normal LV filling pressures.   -Mild mitral regurgitation.   -The left atrium is mildly dilated.   -The aortic valve appears sclerotic but opens well.   -Mild tricuspid regurgitation with PASP of 31 mmHg.     Last Stress Test: 1/10/20  Summary    Normal myocardial perfusion.    Normal LV size and systolic function.      Last Angiogram: 9/29/20    Anatomy:   LM-normal  LAD-30% proximal and mid  Cx-normal  OM1- normal  RCA-20% mid and distal, dominant  RPDA- normal  LVEF-60%     Impression:  1.  Mild nonobstructive CAD. 2.  Normal LV systolic function. 3.  Noncardiac chest pain.          Neuro/Psych:   (+) neuromuscular disease:,    (-) seizures, TIA and CVA           GI/Hepatic/Renal:   (+) GERD (denies gerd sx today):,           Endo/Other:    (+) Diabetes, hypothyroidism::., .                 Abdominal:             Vascular: Other Findings:           Anesthesia Plan      MAC     ASA 3     (Albuterol neb pre procedure)  Induction: intravenous. Anesthetic plan and risks discussed with patient. Plan discussed with CRNA.                   James Echevarria MD   9/2/2021

## 2021-09-10 ENCOUNTER — TELEPHONE (OUTPATIENT)
Dept: SURGERY | Age: 81
End: 2021-09-10

## 2021-09-10 NOTE — TELEPHONE ENCOUNTER
Will call Dr. Perkins Bi office to request the EGD to be faxed over so Sarah Miranda can review then I will call her to schedule.

## 2021-09-12 DIAGNOSIS — E03.9 ACQUIRED HYPOTHYROIDISM: ICD-10-CM

## 2021-09-12 DIAGNOSIS — I10 ESSENTIAL HYPERTENSION: ICD-10-CM

## 2021-09-13 RX ORDER — LEVOTHYROXINE SODIUM 0.1 MG/1
TABLET ORAL
Qty: 90 TABLET | Refills: 1 | Status: SHIPPED | OUTPATIENT
Start: 2021-09-13 | End: 2022-03-07

## 2021-09-13 RX ORDER — VALSARTAN AND HYDROCHLOROTHIAZIDE 80; 12.5 MG/1; MG/1
1 TABLET, FILM COATED ORAL DAILY
Qty: 90 TABLET | Refills: 3 | Status: SHIPPED | OUTPATIENT
Start: 2021-09-13 | End: 2022-07-05

## 2021-09-13 NOTE — TELEPHONE ENCOUNTER
LOV 07/13/21   NOV 11/16/21 1/9/2021  4:08 AM - Tony, [facility]    Component Value Ref Range & Units Status Collected Lab   TSH 1.860  0.450 - 4.500 uIU/mL Final 01/08/2021  8:09 AM [facility]

## 2021-09-15 DIAGNOSIS — Z01.818 PRE-OP TESTING: Primary | ICD-10-CM

## 2021-09-24 DIAGNOSIS — K21.9 GASTROESOPHAGEAL REFLUX DISEASE WITHOUT ESOPHAGITIS: ICD-10-CM

## 2021-09-24 RX ORDER — OMEPRAZOLE 20 MG/1
CAPSULE, DELAYED RELEASE ORAL
Qty: 90 CAPSULE | Refills: 2 | Status: SHIPPED | OUTPATIENT
Start: 2021-09-24 | End: 2022-07-05

## 2021-10-04 NOTE — PROGRESS NOTES
Name_______________________________________Printed:____________________  Date and time of surgery_10/8/2021_____0730__________________Arrival Time:__0600  main______________   1. The instructions given regarding when and if a patient needs to stop oral intake prior to surgery varies. Follow the specific instructions you were given                  _x__Nothing to eat or to drink after Midnight the night before.                   ____Carbo loading or ERAS instructions will be given to select patients-if you have been given those instructions -please do the following                           The evening before your surgery after dinner before midnight drink 40 ounces of gatorade. If you are diabetic use sugar free. The morning of surgery drink 40 ounces of water. This needs to be finished 3 hours prior to your surgery start time. 2. Take the following pills with a small sip of water on the morning of surgery__inhalers, levothyroxine, prilosec_________________________________________________                  Do not take blood pressure medications ending in pril or sartan the chantale prior to surgery or the morning of surgery_   3. Aspirin, Ibuprofen, Advil, Naproxen, Vitamin E and other Anti-inflammatory products and supplements should be stopped for 5 -7days before surgery or as directed by your physician. 4. Check with your Doctor regarding stopping Plavix, Coumadin,Eliquis, Lovenox,Effient,Pradaxa,Xarelto, Fragmin or other blood thinners and follow their instructions. 5. Do not smoke, and do not drink any alcoholic beverages 24 hours prior to surgery. This includes NA Beer. Refrain from the usage of any recreational drugs. 6. You may brush your teeth and gargle the morning of surgery. DO NOT SWALLOW WATER   7. You MUST make arrangements for a responsible adult to stay on site while you are here and take you home after your surgery. You will not be allowed to leave alone or drive yourself home.   It is strongly suggested someone stay with you the first 24 hrs. Your surgery will be cancelled if you do not have a ride home. 8. A parent/legal guardian must accompany a child scheduled for surgery and plan to stay at the hospital until the child is discharged. Please do not bring other children with you. 9. Please wear simple, loose fitting clothing to the hospital.  [place] not bring valuables (money, credit cards, checkbooks, etc.) Do not wear any makeup (including no eye makeup) or nail polish on your fingers or toes. 10. DO NOT wear any jewelry or piercings on day of surgery. All body piercing jewelry must be removed. 11. If you have _x__dentures, they will be removed before going to the OR; we will provide you a container. If you wear ___contact lenses or _x__glasses, they will be removed; please bring a case for them. 12. Please see your family doctor/pediatrician for a history & physical and/or concerning medications. Bring any test results/reports from your physician's office. PCP__________________Phone___________H&P Appt. Date________             13 If you  have a Living Will and Durable Power of  for Healthcare, please bring in a copy. 15. Notify your Surgeon if you develop any illness between now and surgery  time, cough, cold, fever, sore throat, nausea, vomiting, etc.  Please notify your surgeon if you experience dizziness, shortness of breath or blurred vision between now & the time of your surgery             15. DO NOT shave your operative site 96 hours prior to surgery. For face & neck surgery, men may use an electric razor 48 hours prior to surgery. 16. Shower the night before or morning of surgery using an antibacterial soap or as you have been instructed. 17. To provide excellent care visitors will be limited to one in the room at any given time. 18.  Please bring picture ID and insurance card.              19. Visit our web site for additional information:  [email or web address]              20.During flu season no children under the age of 15 are permitted in the hospital for the safety of all patients. 21. If you take a long acting insulin in the evening only  take half of your usual  dose the night  before your procedure              22. If you use a c-pap please bring DOS if staying overnight,             23.For your convenience [place] has a pharmacy on site to fill your prescriptions. 24. If you use oxygen and have a portable tank please bring it  with you the DOS             25. Bring a complete list of all your medications with name and dose include any supplements. 26. Other__________________________________________   *Please call pre admission testing if you any further questions   Radha Kahn   [address] [place] [address]. Air  [phone]   [address] [place] [address]   [phone]           COVID TESTING    _x__ Covid test to be done 3-5 days prior to scheduled surgery -patient aware they are REQUIRED to bring a copy of the negative result DOS-if they receive a positive result to notify their surgeon         If known - indicate where patient is getting covid test done _@ [facility], 10/4/2021__________________________________________________________    ___ Rapid - DOS    ___ Other___________________________________      [place] POLICY(subject to change)    There is a one visitor policy at [facility] for all surgeries and endoscopies. Whether the visitor can stay or will be asked to wait in the car will depend on the current policy and if social distancing can be maintained. The policy is subject to change at any time. Please make sure the visitor has a cell phone that is on,charged and able to accept calls, as this may be the way that the staff communicates with them. Pain management is NO VISITOR policyThe patients ride is expected to remain in the car with a cell phone for communication. If the ride is leaving the hospital grounds please make sure they are back in time for pickup. Have the patient inform the staff on arrival what their rides plans are while the patient is in the facility. At the MAIN there is one visitor allowed. Please note that the visitor policy is subject to change. All above information reviewed with patient in person or by phone. Patient verbalizes understanding. All questions and concerns addressed.                                                                                                  Patient/Rep__patient__________________                                                                                                                                    PRE OP INSTRUCTIONS

## 2021-10-05 LAB — SARS-COV-2: NOT DETECTED

## 2021-10-08 ENCOUNTER — HOSPITAL ENCOUNTER (INPATIENT)
Age: 81
LOS: 1 days | Discharge: HOME | DRG: 328 | End: 2021-10-09
Attending: SURGERY | Admitting: SURGERY
Payer: MEDICARE

## 2021-10-08 ENCOUNTER — ANESTHESIA EVENT (OUTPATIENT)
Dept: OPERATING ROOM | Age: 81
DRG: 328 | End: 2021-10-08
Payer: MEDICARE

## 2021-10-08 ENCOUNTER — ANESTHESIA (OUTPATIENT)
Dept: OPERATING ROOM | Age: 81
DRG: 328 | End: 2021-10-08
Payer: MEDICARE

## 2021-10-08 VITALS
RESPIRATION RATE: 9 BRPM | SYSTOLIC BLOOD PRESSURE: 135 MMHG | OXYGEN SATURATION: 90 % | TEMPERATURE: 96.4 F | DIASTOLIC BLOOD PRESSURE: 65 MMHG

## 2021-10-08 DIAGNOSIS — K44.9 PARAESOPHAGEAL HIATAL HERNIA: Primary | ICD-10-CM

## 2021-10-08 LAB
ABO/RH: NORMAL
ANION GAP SERPL CALCULATED.3IONS-SCNC: 10 MMOL/L (ref 3–16)
ANTIBODY SCREEN: NORMAL
BUN BLDV-MCNC: 13 MG/DL (ref 7–20)
CALCIUM SERPL-MCNC: 9.6 MG/DL (ref 8.3–10.6)
CHLORIDE BLD-SCNC: 101 MMOL/L (ref 99–110)
CO2: 28 MMOL/L (ref 21–32)
CREAT SERPL-MCNC: 0.6 MG/DL (ref 0.6–1.2)
GFR AFRICAN AMERICAN: >60
GFR NON-AFRICAN AMERICAN: >60
GLUCOSE BLD-MCNC: 110 MG/DL (ref 70–99)
GLUCOSE BLD-MCNC: 114 MG/DL (ref 70–99)
GLUCOSE BLD-MCNC: 158 MG/DL (ref 70–99)
GLUCOSE BLD-MCNC: 160 MG/DL (ref 70–99)
GLUCOSE BLD-MCNC: 179 MG/DL (ref 70–99)
HCT VFR BLD CALC: 44.1 % (ref 36–48)
HEMOGLOBIN: 14.9 G/DL (ref 12–16)
MCH RBC QN AUTO: 29.4 PG (ref 26–34)
MCHC RBC AUTO-ENTMCNC: 33.8 G/DL (ref 31–36)
MCV RBC AUTO: 86.9 FL (ref 80–100)
PDW BLD-RTO: 13.3 % (ref 12.4–15.4)
PERFORMED ON: ABNORMAL
PLATELET # BLD: 263 K/UL (ref 135–450)
PMV BLD AUTO: 8.5 FL (ref 5–10.5)
POTASSIUM SERPL-SCNC: 3.9 MMOL/L (ref 3.5–5.1)
RBC # BLD: 5.07 M/UL (ref 4–5.2)
SODIUM BLD-SCNC: 139 MMOL/L (ref 136–145)
WBC # BLD: 6.2 K/UL (ref 4–11)

## 2021-10-08 PROCEDURE — 6360000002 HC RX W HCPCS: Performed by: NURSE ANESTHETIST, CERTIFIED REGISTERED

## 2021-10-08 PROCEDURE — 3700000000 HC ANESTHESIA ATTENDED CARE: Performed by: SURGERY

## 2021-10-08 PROCEDURE — 0BUT4JZ SUPPLEMENT DIAPHRAGM WITH SYNTHETIC SUBSTITUTE, PERCUTANEOUS ENDOSCOPIC APPROACH: ICD-10-PCS | Performed by: SURGERY

## 2021-10-08 PROCEDURE — 2500000003 HC RX 250 WO HCPCS: Performed by: SURGERY

## 2021-10-08 PROCEDURE — 2580000003 HC RX 258: Performed by: NURSE ANESTHETIST, CERTIFIED REGISTERED

## 2021-10-08 PROCEDURE — 0DV44ZZ RESTRICTION OF ESOPHAGOGASTRIC JUNCTION, PERCUTANEOUS ENDOSCOPIC APPROACH: ICD-10-PCS | Performed by: SURGERY

## 2021-10-08 PROCEDURE — 80048 BASIC METABOLIC PNL TOTAL CA: CPT

## 2021-10-08 PROCEDURE — 86850 RBC ANTIBODY SCREEN: CPT

## 2021-10-08 PROCEDURE — 6360000002 HC RX W HCPCS: Performed by: SURGERY

## 2021-10-08 PROCEDURE — 3700000001 HC ADD 15 MINUTES (ANESTHESIA): Performed by: SURGERY

## 2021-10-08 PROCEDURE — 36415 COLL VENOUS BLD VENIPUNCTURE: CPT

## 2021-10-08 PROCEDURE — 2720000010 HC SURG SUPPLY STERILE: Performed by: SURGERY

## 2021-10-08 PROCEDURE — G0378 HOSPITAL OBSERVATION PER HR: HCPCS

## 2021-10-08 PROCEDURE — 86901 BLOOD TYPING SEROLOGIC RH(D): CPT

## 2021-10-08 PROCEDURE — P9045 ALBUMIN (HUMAN), 5%, 250 ML: HCPCS | Performed by: NURSE ANESTHETIST, CERTIFIED REGISTERED

## 2021-10-08 PROCEDURE — 2580000003 HC RX 258: Performed by: SURGERY

## 2021-10-08 PROCEDURE — 2500000003 HC RX 250 WO HCPCS: Performed by: NURSE ANESTHETIST, CERTIFIED REGISTERED

## 2021-10-08 PROCEDURE — 2580000003 HC RX 258: Performed by: ANESTHESIOLOGY

## 2021-10-08 PROCEDURE — 6370000000 HC RX 637 (ALT 250 FOR IP): Performed by: SURGERY

## 2021-10-08 PROCEDURE — 85027 COMPLETE CBC AUTOMATED: CPT

## 2021-10-08 PROCEDURE — C1781 MESH (IMPLANTABLE): HCPCS | Performed by: SURGERY

## 2021-10-08 PROCEDURE — 2709999900 HC NON-CHARGEABLE SUPPLY: Performed by: SURGERY

## 2021-10-08 PROCEDURE — 3600000005 HC SURGERY LEVEL 5 BASE: Performed by: SURGERY

## 2021-10-08 PROCEDURE — 3600000015 HC SURGERY LEVEL 5 ADDTL 15MIN: Performed by: SURGERY

## 2021-10-08 PROCEDURE — 43282 LAP PARAESOPH HER RPR W/MESH: CPT | Performed by: SURGERY

## 2021-10-08 PROCEDURE — 7100000001 HC PACU RECOVERY - ADDTL 15 MIN: Performed by: SURGERY

## 2021-10-08 PROCEDURE — 7100000000 HC PACU RECOVERY - FIRST 15 MIN: Performed by: SURGERY

## 2021-10-08 PROCEDURE — 6360000002 HC RX W HCPCS: Performed by: ANESTHESIOLOGY

## 2021-10-08 PROCEDURE — 88302 TISSUE EXAM BY PATHOLOGIST: CPT

## 2021-10-08 PROCEDURE — 6370000000 HC RX 637 (ALT 250 FOR IP): Performed by: ANESTHESIOLOGY

## 2021-10-08 PROCEDURE — 86900 BLOOD TYPING SEROLOGIC ABO: CPT

## 2021-10-08 DEVICE — MESH HERN W7XL10CM SYN TISS REINF BIOABSRB DISP BIO-A: Type: IMPLANTABLE DEVICE | Site: ABDOMEN | Status: FUNCTIONAL

## 2021-10-08 RX ORDER — OXYCODONE HYDROCHLORIDE 5 MG/1
5 TABLET ORAL EVERY 4 HOURS PRN
Status: DISCONTINUED | OUTPATIENT
Start: 2021-10-08 | End: 2021-10-09 | Stop reason: HOSPADM

## 2021-10-08 RX ORDER — MORPHINE SULFATE 4 MG/ML
4 INJECTION, SOLUTION INTRAMUSCULAR; INTRAVENOUS
Status: DISCONTINUED | OUTPATIENT
Start: 2021-10-08 | End: 2021-10-09 | Stop reason: HOSPADM

## 2021-10-08 RX ORDER — LIDOCAINE HYDROCHLORIDE 10 MG/ML
1 INJECTION, SOLUTION EPIDURAL; INFILTRATION; INTRACAUDAL; PERINEURAL
Status: DISCONTINUED | OUTPATIENT
Start: 2021-10-08 | End: 2021-10-08 | Stop reason: HOSPADM

## 2021-10-08 RX ORDER — MAGNESIUM SULFATE HEPTAHYDRATE 500 MG/ML
INJECTION, SOLUTION INTRAMUSCULAR; INTRAVENOUS PRN
Status: DISCONTINUED | OUTPATIENT
Start: 2021-10-08 | End: 2021-10-08 | Stop reason: SDUPTHER

## 2021-10-08 RX ORDER — PANTOPRAZOLE SODIUM 40 MG/1
40 TABLET, DELAYED RELEASE ORAL
Status: DISCONTINUED | OUTPATIENT
Start: 2021-10-09 | End: 2021-10-09 | Stop reason: HOSPADM

## 2021-10-08 RX ORDER — BUPIVACAINE HYDROCHLORIDE 5 MG/ML
INJECTION, SOLUTION EPIDURAL; INTRACAUDAL
Status: COMPLETED | OUTPATIENT
Start: 2021-10-08 | End: 2021-10-08

## 2021-10-08 RX ORDER — DEXMEDETOMIDINE HYDROCHLORIDE 100 UG/ML
INJECTION, SOLUTION INTRAVENOUS PRN
Status: DISCONTINUED | OUTPATIENT
Start: 2021-10-08 | End: 2021-10-08 | Stop reason: SDUPTHER

## 2021-10-08 RX ORDER — ATORVASTATIN CALCIUM 10 MG/1
10 TABLET, FILM COATED ORAL DAILY
Status: DISCONTINUED | OUTPATIENT
Start: 2021-10-09 | End: 2021-10-09 | Stop reason: HOSPADM

## 2021-10-08 RX ORDER — KETOROLAC TROMETHAMINE 15 MG/ML
15 INJECTION, SOLUTION INTRAMUSCULAR; INTRAVENOUS EVERY 6 HOURS PRN
Status: DISCONTINUED | OUTPATIENT
Start: 2021-10-08 | End: 2021-10-09 | Stop reason: HOSPADM

## 2021-10-08 RX ORDER — MORPHINE SULFATE 2 MG/ML
2 INJECTION, SOLUTION INTRAMUSCULAR; INTRAVENOUS
Status: DISCONTINUED | OUTPATIENT
Start: 2021-10-08 | End: 2021-10-09 | Stop reason: HOSPADM

## 2021-10-08 RX ORDER — ROCURONIUM BROMIDE 10 MG/ML
INJECTION, SOLUTION INTRAVENOUS PRN
Status: DISCONTINUED | OUTPATIENT
Start: 2021-10-08 | End: 2021-10-08 | Stop reason: SDUPTHER

## 2021-10-08 RX ORDER — LIDOCAINE HYDROCHLORIDE 20 MG/ML
INJECTION, SOLUTION EPIDURAL; INFILTRATION; INTRACAUDAL; PERINEURAL PRN
Status: DISCONTINUED | OUTPATIENT
Start: 2021-10-08 | End: 2021-10-08 | Stop reason: SDUPTHER

## 2021-10-08 RX ORDER — SODIUM CHLORIDE 9 MG/ML
INJECTION, SOLUTION INTRAVENOUS CONTINUOUS PRN
Status: DISCONTINUED | OUTPATIENT
Start: 2021-10-08 | End: 2021-10-08 | Stop reason: SDUPTHER

## 2021-10-08 RX ORDER — FENTANYL CITRATE 50 UG/ML
25 INJECTION, SOLUTION INTRAMUSCULAR; INTRAVENOUS EVERY 5 MIN PRN
Status: DISCONTINUED | OUTPATIENT
Start: 2021-10-08 | End: 2021-10-08 | Stop reason: HOSPADM

## 2021-10-08 RX ORDER — ACETAMINOPHEN 500 MG
1000 TABLET ORAL EVERY 8 HOURS
Status: DISCONTINUED | OUTPATIENT
Start: 2021-10-08 | End: 2021-10-09 | Stop reason: HOSPADM

## 2021-10-08 RX ORDER — IPRATROPIUM BROMIDE AND ALBUTEROL SULFATE 2.5; .5 MG/3ML; MG/3ML
1 SOLUTION RESPIRATORY (INHALATION) ONCE
Status: COMPLETED | OUTPATIENT
Start: 2021-10-08 | End: 2021-10-08

## 2021-10-08 RX ORDER — HYDROMORPHONE HCL 110MG/55ML
0.25 PATIENT CONTROLLED ANALGESIA SYRINGE INTRAVENOUS EVERY 5 MIN PRN
Status: DISCONTINUED | OUTPATIENT
Start: 2021-10-08 | End: 2021-10-08 | Stop reason: HOSPADM

## 2021-10-08 RX ORDER — SODIUM CHLORIDE 0.9 % (FLUSH) 0.9 %
5-40 SYRINGE (ML) INJECTION EVERY 12 HOURS SCHEDULED
Status: DISCONTINUED | OUTPATIENT
Start: 2021-10-08 | End: 2021-10-09 | Stop reason: HOSPADM

## 2021-10-08 RX ORDER — ALBUMIN, HUMAN INJ 5% 5 %
SOLUTION INTRAVENOUS PRN
Status: DISCONTINUED | OUTPATIENT
Start: 2021-10-08 | End: 2021-10-08 | Stop reason: SDUPTHER

## 2021-10-08 RX ORDER — PROPOFOL 10 MG/ML
INJECTION, EMULSION INTRAVENOUS PRN
Status: DISCONTINUED | OUTPATIENT
Start: 2021-10-08 | End: 2021-10-08 | Stop reason: SDUPTHER

## 2021-10-08 RX ORDER — ALBUTEROL SULFATE 2.5 MG/3ML
2.5 SOLUTION RESPIRATORY (INHALATION) ONCE
Status: DISCONTINUED | OUTPATIENT
Start: 2021-10-08 | End: 2021-10-09 | Stop reason: HOSPADM

## 2021-10-08 RX ORDER — LEVOTHYROXINE SODIUM 0.1 MG/1
100 TABLET ORAL DAILY
Status: DISCONTINUED | OUTPATIENT
Start: 2021-10-09 | End: 2021-10-09 | Stop reason: HOSPADM

## 2021-10-08 RX ORDER — DEXAMETHASONE SODIUM PHOSPHATE 4 MG/ML
INJECTION, SOLUTION INTRA-ARTICULAR; INTRALESIONAL; INTRAMUSCULAR; INTRAVENOUS; SOFT TISSUE PRN
Status: DISCONTINUED | OUTPATIENT
Start: 2021-10-08 | End: 2021-10-08 | Stop reason: SDUPTHER

## 2021-10-08 RX ORDER — DEXTROSE, SODIUM CHLORIDE, AND POTASSIUM CHLORIDE 5; .45; .15 G/100ML; G/100ML; G/100ML
INJECTION INTRAVENOUS CONTINUOUS
Status: DISCONTINUED | OUTPATIENT
Start: 2021-10-08 | End: 2021-10-09

## 2021-10-08 RX ORDER — SODIUM CHLORIDE, SODIUM LACTATE, POTASSIUM CHLORIDE, CALCIUM CHLORIDE 600; 310; 30; 20 MG/100ML; MG/100ML; MG/100ML; MG/100ML
INJECTION, SOLUTION INTRAVENOUS CONTINUOUS PRN
Status: COMPLETED | OUTPATIENT
Start: 2021-10-08 | End: 2021-10-08

## 2021-10-08 RX ORDER — FENTANYL CITRATE 50 UG/ML
50 INJECTION, SOLUTION INTRAMUSCULAR; INTRAVENOUS EVERY 5 MIN PRN
Status: DISCONTINUED | OUTPATIENT
Start: 2021-10-08 | End: 2021-10-08 | Stop reason: HOSPADM

## 2021-10-08 RX ORDER — SODIUM CHLORIDE 9 MG/ML
25 INJECTION, SOLUTION INTRAVENOUS PRN
Status: DISCONTINUED | OUTPATIENT
Start: 2021-10-08 | End: 2021-10-09 | Stop reason: HOSPADM

## 2021-10-08 RX ORDER — MAGNESIUM HYDROXIDE 1200 MG/15ML
LIQUID ORAL CONTINUOUS PRN
Status: COMPLETED | OUTPATIENT
Start: 2021-10-08 | End: 2021-10-08

## 2021-10-08 RX ORDER — SODIUM CHLORIDE 0.9 % (FLUSH) 0.9 %
5-40 SYRINGE (ML) INJECTION PRN
Status: DISCONTINUED | OUTPATIENT
Start: 2021-10-08 | End: 2021-10-09 | Stop reason: HOSPADM

## 2021-10-08 RX ORDER — ALBUTEROL SULFATE 90 UG/1
2 AEROSOL, METERED RESPIRATORY (INHALATION) EVERY 6 HOURS PRN
Status: DISCONTINUED | OUTPATIENT
Start: 2021-10-08 | End: 2021-10-09 | Stop reason: HOSPADM

## 2021-10-08 RX ORDER — HYDROMORPHONE HCL 110MG/55ML
0.5 PATIENT CONTROLLED ANALGESIA SYRINGE INTRAVENOUS EVERY 5 MIN PRN
Status: DISCONTINUED | OUTPATIENT
Start: 2021-10-08 | End: 2021-10-08 | Stop reason: HOSPADM

## 2021-10-08 RX ORDER — FENTANYL CITRATE 50 UG/ML
INJECTION, SOLUTION INTRAMUSCULAR; INTRAVENOUS PRN
Status: DISCONTINUED | OUTPATIENT
Start: 2021-10-08 | End: 2021-10-08 | Stop reason: SDUPTHER

## 2021-10-08 RX ORDER — PHENYLEPHRINE HCL IN 0.9% NACL 1 MG/10 ML
SYRINGE (ML) INTRAVENOUS PRN
Status: DISCONTINUED | OUTPATIENT
Start: 2021-10-08 | End: 2021-10-08 | Stop reason: SDUPTHER

## 2021-10-08 RX ORDER — SODIUM CHLORIDE 9 MG/ML
INJECTION, SOLUTION INTRAVENOUS CONTINUOUS
Status: DISCONTINUED | OUTPATIENT
Start: 2021-10-08 | End: 2021-10-08

## 2021-10-08 RX ORDER — VALSARTAN 80 MG/1
80 TABLET ORAL DAILY
Status: DISCONTINUED | OUTPATIENT
Start: 2021-10-08 | End: 2021-10-09 | Stop reason: HOSPADM

## 2021-10-08 RX ORDER — CETIRIZINE HYDROCHLORIDE 10 MG/1
10 TABLET ORAL DAILY
Status: DISCONTINUED | OUTPATIENT
Start: 2021-10-09 | End: 2021-10-09 | Stop reason: HOSPADM

## 2021-10-08 RX ORDER — ALBUTEROL SULFATE 2.5 MG/3ML
2.5 SOLUTION RESPIRATORY (INHALATION) ONCE
Status: COMPLETED | OUTPATIENT
Start: 2021-10-08 | End: 2021-10-08

## 2021-10-08 RX ORDER — SUCCINYLCHOLINE/SOD CL,ISO/PF 200MG/10ML
SYRINGE (ML) INTRAVENOUS PRN
Status: DISCONTINUED | OUTPATIENT
Start: 2021-10-08 | End: 2021-10-08 | Stop reason: SDUPTHER

## 2021-10-08 RX ORDER — HYDROCHLOROTHIAZIDE 25 MG/1
12.5 TABLET ORAL DAILY
Status: DISCONTINUED | OUTPATIENT
Start: 2021-10-08 | End: 2021-10-09 | Stop reason: HOSPADM

## 2021-10-08 RX ORDER — PROMETHAZINE HYDROCHLORIDE 25 MG/ML
6.25 INJECTION, SOLUTION INTRAMUSCULAR; INTRAVENOUS
Status: DISCONTINUED | OUTPATIENT
Start: 2021-10-08 | End: 2021-10-08 | Stop reason: HOSPADM

## 2021-10-08 RX ORDER — ONDANSETRON 2 MG/ML
4 INJECTION INTRAMUSCULAR; INTRAVENOUS EVERY 4 HOURS PRN
Status: DISCONTINUED | OUTPATIENT
Start: 2021-10-08 | End: 2021-10-09 | Stop reason: HOSPADM

## 2021-10-08 RX ORDER — HYDROCODONE BITARTRATE AND ACETAMINOPHEN 5; 325 MG/1; MG/1
1 TABLET ORAL
Status: DISCONTINUED | OUTPATIENT
Start: 2021-10-08 | End: 2021-10-08 | Stop reason: HOSPADM

## 2021-10-08 RX ORDER — METOCLOPRAMIDE HYDROCHLORIDE 5 MG/ML
10 INJECTION INTRAMUSCULAR; INTRAVENOUS EVERY 6 HOURS
Status: DISCONTINUED | OUTPATIENT
Start: 2021-10-08 | End: 2021-10-09 | Stop reason: HOSPADM

## 2021-10-08 RX ORDER — IPRATROPIUM BROMIDE AND ALBUTEROL SULFATE 2.5; .5 MG/3ML; MG/3ML
1 SOLUTION RESPIRATORY (INHALATION) 4 TIMES DAILY
Status: DISCONTINUED | OUTPATIENT
Start: 2021-10-08 | End: 2021-10-09 | Stop reason: HOSPADM

## 2021-10-08 RX ORDER — HYDROMORPHONE HCL 110MG/55ML
PATIENT CONTROLLED ANALGESIA SYRINGE INTRAVENOUS PRN
Status: DISCONTINUED | OUTPATIENT
Start: 2021-10-08 | End: 2021-10-08 | Stop reason: SDUPTHER

## 2021-10-08 RX ORDER — VALSARTAN AND HYDROCHLOROTHIAZIDE 80; 12.5 MG/1; MG/1
1 TABLET, FILM COATED ORAL DAILY
Status: DISCONTINUED | OUTPATIENT
Start: 2021-10-08 | End: 2021-10-08 | Stop reason: CLARIF

## 2021-10-08 RX ORDER — BUDESONIDE AND FORMOTEROL FUMARATE DIHYDRATE 80; 4.5 UG/1; UG/1
2 AEROSOL RESPIRATORY (INHALATION) 2 TIMES DAILY
Status: DISCONTINUED | OUTPATIENT
Start: 2021-10-08 | End: 2021-10-09 | Stop reason: HOSPADM

## 2021-10-08 RX ORDER — ONDANSETRON 2 MG/ML
INJECTION INTRAMUSCULAR; INTRAVENOUS PRN
Status: DISCONTINUED | OUTPATIENT
Start: 2021-10-08 | End: 2021-10-08 | Stop reason: SDUPTHER

## 2021-10-08 RX ORDER — ONDANSETRON 2 MG/ML
4 INJECTION INTRAMUSCULAR; INTRAVENOUS
Status: DISCONTINUED | OUTPATIENT
Start: 2021-10-08 | End: 2021-10-08 | Stop reason: HOSPADM

## 2021-10-08 RX ADMIN — MORPHINE SULFATE 2 MG: 2 INJECTION, SOLUTION INTRAMUSCULAR; INTRAVENOUS at 17:37

## 2021-10-08 RX ADMIN — Medication 200 MCG: at 08:18

## 2021-10-08 RX ADMIN — ROCURONIUM BROMIDE 15 MG: 10 INJECTION, SOLUTION INTRAVENOUS at 09:20

## 2021-10-08 RX ADMIN — Medication 50 MCG: at 07:58

## 2021-10-08 RX ADMIN — Medication 200 MCG: at 08:12

## 2021-10-08 RX ADMIN — HYDROMORPHONE HYDROCHLORIDE 0.2 MG: 2 INJECTION, SOLUTION INTRAMUSCULAR; INTRAVENOUS; SUBCUTANEOUS at 10:24

## 2021-10-08 RX ADMIN — HYDROMORPHONE HYDROCHLORIDE 0.5 MG: 2 INJECTION, SOLUTION INTRAMUSCULAR; INTRAVENOUS; SUBCUTANEOUS at 11:13

## 2021-10-08 RX ADMIN — POTASSIUM CHLORIDE, DEXTROSE MONOHYDRATE AND SODIUM CHLORIDE: 150; 5; 450 INJECTION, SOLUTION INTRAVENOUS at 11:18

## 2021-10-08 RX ADMIN — KETOROLAC TROMETHAMINE 15 MG: 15 INJECTION, SOLUTION INTRAMUSCULAR; INTRAVENOUS at 12:18

## 2021-10-08 RX ADMIN — SODIUM CHLORIDE: 9 INJECTION, SOLUTION INTRAVENOUS at 08:52

## 2021-10-08 RX ADMIN — OXYCODONE 5 MG: 5 TABLET ORAL at 19:28

## 2021-10-08 RX ADMIN — DEXMEDETOMIDINE HYDROCHLORIDE 5 MCG: 100 INJECTION, SOLUTION INTRAVENOUS at 09:07

## 2021-10-08 RX ADMIN — METOCLOPRAMIDE 10 MG: 5 INJECTION, SOLUTION INTRAMUSCULAR; INTRAVENOUS at 17:37

## 2021-10-08 RX ADMIN — SODIUM CHLORIDE: 9 INJECTION, SOLUTION INTRAVENOUS at 06:44

## 2021-10-08 RX ADMIN — MAGNESIUM SULFATE HEPTAHYDRATE 1 G: 500 INJECTION, SOLUTION INTRAMUSCULAR; INTRAVENOUS at 07:48

## 2021-10-08 RX ADMIN — PROPOFOL 100 MG: 10 INJECTION, EMULSION INTRAVENOUS at 07:35

## 2021-10-08 RX ADMIN — HYDROMORPHONE HYDROCHLORIDE 0.2 MG: 2 INJECTION, SOLUTION INTRAMUSCULAR; INTRAVENOUS; SUBCUTANEOUS at 10:22

## 2021-10-08 RX ADMIN — Medication 200 MCG: at 08:38

## 2021-10-08 RX ADMIN — ALBUMIN (HUMAN) 250 ML: 12.5 INJECTION, SOLUTION INTRAVENOUS at 08:18

## 2021-10-08 RX ADMIN — Medication 200 MCG: at 08:16

## 2021-10-08 RX ADMIN — SODIUM CHLORIDE: 9 INJECTION, SOLUTION INTRAVENOUS at 07:30

## 2021-10-08 RX ADMIN — MORPHINE SULFATE 2 MG: 2 INJECTION, SOLUTION INTRAMUSCULAR; INTRAVENOUS at 13:44

## 2021-10-08 RX ADMIN — LIDOCAINE HYDROCHLORIDE 100 MG: 20 INJECTION, SOLUTION EPIDURAL; INFILTRATION; INTRACAUDAL; PERINEURAL at 07:35

## 2021-10-08 RX ADMIN — ROCURONIUM BROMIDE 30 MG: 10 INJECTION, SOLUTION INTRAVENOUS at 07:45

## 2021-10-08 RX ADMIN — IPRATROPIUM BROMIDE AND ALBUTEROL SULFATE 1 AMPULE: .5; 3 SOLUTION RESPIRATORY (INHALATION) at 07:25

## 2021-10-08 RX ADMIN — Medication 200 MCG: at 08:28

## 2021-10-08 RX ADMIN — METOCLOPRAMIDE 10 MG: 5 INJECTION, SOLUTION INTRAMUSCULAR; INTRAVENOUS at 12:17

## 2021-10-08 RX ADMIN — DEXAMETHASONE SODIUM PHOSPHATE 4 MG: 4 INJECTION, SOLUTION INTRAMUSCULAR; INTRAVENOUS at 07:44

## 2021-10-08 RX ADMIN — SUGAMMADEX 200 MG: 100 INJECTION, SOLUTION INTRAVENOUS at 10:16

## 2021-10-08 RX ADMIN — Medication 50 MCG: at 07:56

## 2021-10-08 RX ADMIN — SUGAMMADEX 100 MG: 100 INJECTION, SOLUTION INTRAVENOUS at 10:24

## 2021-10-08 RX ADMIN — CEFAZOLIN 2000 MG: 10 INJECTION, POWDER, FOR SOLUTION INTRAVENOUS at 07:26

## 2021-10-08 RX ADMIN — Medication 100 MCG: at 08:14

## 2021-10-08 RX ADMIN — Medication 120 MG: at 07:36

## 2021-10-08 RX ADMIN — ROCURONIUM BROMIDE 20 MG: 10 INJECTION, SOLUTION INTRAVENOUS at 08:46

## 2021-10-08 RX ADMIN — FENTANYL CITRATE 50 MCG: 50 INJECTION, SOLUTION INTRAMUSCULAR; INTRAVENOUS at 07:35

## 2021-10-08 RX ADMIN — ALBUTEROL SULFATE 2.5 MG: 2.5 SOLUTION RESPIRATORY (INHALATION) at 10:35

## 2021-10-08 RX ADMIN — ACETAMINOPHEN 1000 MG: 500 TABLET ORAL at 12:18

## 2021-10-08 RX ADMIN — DEXMEDETOMIDINE HYDROCHLORIDE 10 MCG: 100 INJECTION, SOLUTION INTRAVENOUS at 08:01

## 2021-10-08 RX ADMIN — HYDROMORPHONE HYDROCHLORIDE 0.5 MG: 2 INJECTION, SOLUTION INTRAMUSCULAR; INTRAVENOUS; SUBCUTANEOUS at 10:57

## 2021-10-08 RX ADMIN — METOCLOPRAMIDE 10 MG: 5 INJECTION, SOLUTION INTRAMUSCULAR; INTRAVENOUS at 23:31

## 2021-10-08 RX ADMIN — ACETAMINOPHEN 1000 MG: 500 TABLET ORAL at 21:09

## 2021-10-08 RX ADMIN — ONDANSETRON 4 MG: 2 INJECTION INTRAMUSCULAR; INTRAVENOUS at 10:06

## 2021-10-08 RX ADMIN — ROCURONIUM BROMIDE 10 MG: 10 INJECTION, SOLUTION INTRAVENOUS at 07:36

## 2021-10-08 RX ADMIN — FENTANYL CITRATE 50 MCG: 50 INJECTION, SOLUTION INTRAMUSCULAR; INTRAVENOUS at 10:19

## 2021-10-08 RX ADMIN — PHENYLEPHRINE HYDROCHLORIDE 25 MCG/MIN: 10 INJECTION INTRAVENOUS at 08:44

## 2021-10-08 ASSESSMENT — PULMONARY FUNCTION TESTS
PIF_VALUE: 32
PIF_VALUE: 31
PIF_VALUE: 32
PIF_VALUE: 31
PIF_VALUE: 1
PIF_VALUE: 33
PIF_VALUE: 19
PIF_VALUE: 29
PIF_VALUE: 35
PIF_VALUE: 29
PIF_VALUE: 27
PIF_VALUE: 3
PIF_VALUE: 26
PIF_VALUE: 22
PIF_VALUE: 26
PIF_VALUE: 31
PIF_VALUE: 2
PIF_VALUE: 31
PIF_VALUE: 27
PIF_VALUE: 25
PIF_VALUE: 31
PIF_VALUE: 4
PIF_VALUE: 32
PIF_VALUE: 3
PIF_VALUE: 34
PIF_VALUE: 30
PIF_VALUE: 33
PIF_VALUE: 31
PIF_VALUE: 32
PIF_VALUE: 33
PIF_VALUE: 19
PIF_VALUE: 32
PIF_VALUE: 33
PIF_VALUE: 2
PIF_VALUE: 33
PIF_VALUE: 18
PIF_VALUE: 33
PIF_VALUE: 32
PIF_VALUE: 33
PIF_VALUE: 33
PIF_VALUE: 22
PIF_VALUE: 3
PIF_VALUE: 26
PIF_VALUE: 32
PIF_VALUE: 26
PIF_VALUE: 29
PIF_VALUE: 32
PIF_VALUE: 30
PIF_VALUE: 32
PIF_VALUE: 33
PIF_VALUE: 19
PIF_VALUE: 25
PIF_VALUE: 19
PIF_VALUE: 31
PIF_VALUE: 18
PIF_VALUE: 32
PIF_VALUE: 32
PIF_VALUE: 29
PIF_VALUE: 19
PIF_VALUE: 32
PIF_VALUE: 33
PIF_VALUE: 30
PIF_VALUE: 33
PIF_VALUE: 31
PIF_VALUE: 32
PIF_VALUE: 33
PIF_VALUE: 33
PIF_VALUE: 32
PIF_VALUE: 29
PIF_VALUE: 31
PIF_VALUE: 33
PIF_VALUE: 31
PIF_VALUE: 32
PIF_VALUE: 31
PIF_VALUE: 3
PIF_VALUE: 32
PIF_VALUE: 28
PIF_VALUE: 5
PIF_VALUE: 26
PIF_VALUE: 33
PIF_VALUE: 26
PIF_VALUE: 19
PIF_VALUE: 25
PIF_VALUE: 32
PIF_VALUE: 32
PIF_VALUE: 33
PIF_VALUE: 31
PIF_VALUE: 5
PIF_VALUE: 30
PIF_VALUE: 19
PIF_VALUE: 30
PIF_VALUE: 18
PIF_VALUE: 0
PIF_VALUE: 18
PIF_VALUE: 32
PIF_VALUE: 5
PIF_VALUE: 31
PIF_VALUE: 31
PIF_VALUE: 33
PIF_VALUE: 32
PIF_VALUE: 20
PIF_VALUE: 32
PIF_VALUE: 28
PIF_VALUE: 20
PIF_VALUE: 1
PIF_VALUE: 32
PIF_VALUE: 17
PIF_VALUE: 31
PIF_VALUE: 26
PIF_VALUE: 32
PIF_VALUE: 18
PIF_VALUE: 19
PIF_VALUE: 27
PIF_VALUE: 31
PIF_VALUE: 30
PIF_VALUE: 31
PIF_VALUE: 32
PIF_VALUE: 18
PIF_VALUE: 34
PIF_VALUE: 25
PIF_VALUE: 26
PIF_VALUE: 29
PIF_VALUE: 33
PIF_VALUE: 32
PIF_VALUE: 31
PIF_VALUE: 32
PIF_VALUE: 29
PIF_VALUE: 10
PIF_VALUE: 32
PIF_VALUE: 31
PIF_VALUE: 31
PIF_VALUE: 32
PIF_VALUE: 0
PIF_VALUE: 30
PIF_VALUE: 19
PIF_VALUE: 30
PIF_VALUE: 30
PIF_VALUE: 33
PIF_VALUE: 32
PIF_VALUE: 32
PIF_VALUE: 31
PIF_VALUE: 0
PIF_VALUE: 33
PIF_VALUE: 29
PIF_VALUE: 33
PIF_VALUE: 32
PIF_VALUE: 30
PIF_VALUE: 34
PIF_VALUE: 4
PIF_VALUE: 33
PIF_VALUE: 34
PIF_VALUE: 32
PIF_VALUE: 32
PIF_VALUE: 29
PIF_VALUE: 32
PIF_VALUE: 26
PIF_VALUE: 33
PIF_VALUE: 32
PIF_VALUE: 30
PIF_VALUE: 4
PIF_VALUE: 32
PIF_VALUE: 3
PIF_VALUE: 0
PIF_VALUE: 25
PIF_VALUE: 35
PIF_VALUE: 32
PIF_VALUE: 33
PIF_VALUE: 19
PIF_VALUE: 0
PIF_VALUE: 20
PIF_VALUE: 32

## 2021-10-08 ASSESSMENT — PAIN SCALES - GENERAL
PAINLEVEL_OUTOF10: 9
PAINLEVEL_OUTOF10: 9
PAINLEVEL_OUTOF10: 8
PAINLEVEL_OUTOF10: 5
PAINLEVEL_OUTOF10: 7
PAINLEVEL_OUTOF10: 0
PAINLEVEL_OUTOF10: 7
PAINLEVEL_OUTOF10: 8
PAINLEVEL_OUTOF10: 5

## 2021-10-08 ASSESSMENT — PAIN DESCRIPTION - DESCRIPTORS
DESCRIPTORS: ACHING

## 2021-10-08 ASSESSMENT — PAIN DESCRIPTION - ORIENTATION: ORIENTATION: UPPER

## 2021-10-08 ASSESSMENT — PAIN DESCRIPTION - LOCATION
LOCATION: ABDOMEN

## 2021-10-08 ASSESSMENT — PAIN DESCRIPTION - PAIN TYPE
TYPE: SURGICAL PAIN

## 2021-10-08 NOTE — PROGRESS NOTES
Patient asleep, wakes easily, VSS, states pain is \"Better,\" phase I discharge criteria met, will transfer to 4T, waiting for transport.

## 2021-10-08 NOTE — ANESTHESIA PRE PROCEDURE
Department of Anesthesiology  Preprocedure Note       Name:  Augusta Adam   Age:  80 y.o.  :  1940                                          MRN:  [id number]         Date:  10/8/2021      Surgeon: Oscar Field):  Brandi Hoffman MD    Procedure: Procedure(s):  LAPAROSCOPIC HIATAL HERNIA REPAIR, NISSEN FUNDOPLICATION    Medications prior to admission:   Prior to Admission medications    Medication Sig Start Date End Date Taking?  Authorizing Provider   omeprazole (PRILOSEC) 20 MG delayed release capsule TAKE 1 CAPSULE BY MOUTH EVERY MORNING BEFORE BREAKFAST 21   Suad Zhao MD   levothyroxine (SYNTHROID) 100 MCG tablet TAKE 1 TABLET BY MOUTH DAILY 21   OPAL Noel CNP   valsartan-hydroCHLOROthiazide (DIOVAN-HCT) 80-12.5 MG per tablet TAKE 1 TABLET BY MOUTH DAILY 21   POAL Noel CNP   Cyanocobalamin (B-12 PO) Take by mouth daily    Historical Provider, MD   atorvastatin (LIPITOR) 20 MG tablet TAKE 1 TABLET BY MOUTH DAILY  Patient taking differently: Take 10 mg by mouth daily  21   OPAL Lane CNP   fluticasone-salmeterol (ADVAIR) 250-50 MCG/DOSE AEPB Inhale 1 puff into the lungs every 12 hours 21   Suad Zhao MD   fluticasone [facility]) 50 MCG/ACT nasal spray 2 sprays by Nasal route daily as needed (as needed) 12/15/20   OPAL Babin NP   albuterol sulfate  (90 Base) MCG/ACT inhaler INHALE 2 PUFFS INTO LUNGS EVERY 6 HOURS AS NEEDED FOR SHORTNESS OF BREATH OR WHEEZING 20   Suad Zhao MD   ipratropium-albuterol (DUONEB) 0.5-2.5 (3) MG/3ML SOLN nebulizer solution USE 1 VIAL VIA NEBULIZER EVERY 6 HOURS 19   Suad Zhao MD   tiotropium (SPIRIVA HANDIHALER) 18 MCG inhalation capsule Inhale 1 capsule into the lungs daily 10/5/18   Mikey Xiao MD   cetirizine (ZYRTEC) 10 MG tablet Take 10 mg by mouth daily    Historical Provider, MD   acetaminophen (TYLENOL) 500 MG tablet Take 1,000 mg by mouth daily as needed     Historical Provider, MD       Current medications:    No current facility-administered medications for this visit. No current outpatient medications on file. Facility-Administered Medications Ordered in Other Visits   Medication Dose Route Frequency Provider Last Rate Last Admin    HYDROmorphone (DILAUDID) injection 0.25 mg  0.25 mg IntraVENous Q5 Min PRN Hanny Verma MD        HYDROmorphone (DILAUDID) injection 0.5 mg  0.5 mg IntraVENous Q5 Min PRN Hanny Verma MD        fentaNYL (SUBLIMAZE) injection 25 mcg  25 mcg IntraVENous Q5 Min PRN Hanny Veram MD        fentaNYL (SUBLIMAZE) injection 50 mcg  50 mcg IntraVENous Q5 Min PRN Hanny Verma MD        HYDROcodone-acetaminophen (NORCO) 5-325 MG per tablet 1 tablet  1 tablet Oral Once PRN Hanny Verma MD        ondansetron [facility]) injection 4 mg  4 mg IntraVENous Once PRN aHnny Verma MD        promethazine (PHENERGAN) injection 6.25 mg  6.25 mg IntraVENous Once PRN Hanny Verma MD        0.9 % sodium chloride infusion   IntraVENous Continuous Hanny Verma  mL/hr at 10/08/21 0644 New Bag at 10/08/21 0644    lidocaine PF 1 % injection 1 mL  1 mL IntraDERmal Once PRN Hanny Verma MD           Allergies: Allergies   Allergen Reactions    Aspirin      Recurrent GI blood Loss?  Sulfa Antibiotics      Other reaction(s):  Other (See Comments)  hallucinations  hallucinations    Ace Inhibitors Palpitations     Swelling and cough  cough       Problem List:    Patient Active Problem List   Diagnosis Code    Mixed simple and mucopurulent chronic bronchitis (HCC) J41.8    Generalized osteoarthrosis, involving multiple sites M15.9    Hypothyroidism E03.9    Essential hypertension I10    Anemia D64.9    Pure hypercholesterolemia E78.00    Other abnormal glucose R73.09    OA (osteoarthritis) of hip M16.9    Fatigue R53.83  RLS (restless legs syndrome) G25.81    Primary osteoarthritis of both knees M17.0    Idiopathic peripheral neuropathy G60.9    Acute bronchitis J20.9    Gastroesophageal reflux disease K21.9    Irregular heart beat I49.9    Multifocal atrial tachycardia (HCC) I47.1    First degree AV block I44.0    Unstable angina (HCC) I20.0       Past Medical History:        Diagnosis Date    Arthritis     Asthma     Atrial fibrillation (HCC)     past history    COPD (chronic obstructive pulmonary disease) (HCC)     Diabetes mellitus (HCC)     no meds    Hyperlipidemia     Hypertension     Influenza 2018    Thyroid disease        Past Surgical History:        Procedure Laterality Date    COLONOSCOPY      ENDOSCOPY, COLON, DIAGNOSTIC      UPPER GASTROINTESTINAL ENDOSCOPY N/A 2021    EGD DIAGNOSTIC ONLY performed by Delroy Ordonez MD at [address] [email or web address] ENDOSCOPY       Social History:    Social History     Tobacco Use    Smoking status: Former Smoker     Packs/day: 1.00     Years: 10.00     Pack years: 10.00     Types: Cigarettes     Quit date: 10/5/1998     Years since quittin.0    Smokeless tobacco: Never Used    Tobacco comment: was only smoking socially on and off   Substance Use Topics    Alcohol use: No     Alcohol/week: 0.0 standard drinks                                Counseling given: Not Answered  Comment: was only smoking socially on and off      Vital Signs (Current): There were no vitals filed for this visit.                                            BP Readings from Last 3 Encounters:   10/08/21 124/79   21 98/62   21 117/60       NPO Status:                                                                                 BMI:   Wt Readings from Last 3 Encounters:   10/08/21 178 lb (80.7 kg)   21 183 lb (83 kg)   21 185 lb (83.9 kg)     There is no height or weight on file to calculate BMI.    CBC:   Lab Results   Component Value Date    WBC 6.2 10/08/2021 RBC 5.07 10/08/2021    HGB 14.9 10/08/2021    HCT 44.1 10/08/2021    MCV 86.9 10/08/2021    RDW 13.3 10/08/2021     10/08/2021       CMP:   Lab Results   Component Value Date     10/08/2021    K 3.9 10/08/2021    K 3.9 02/09/2018     10/08/2021    CO2 28 10/08/2021    BUN 13 10/08/2021    CREATININE 0.6 10/08/2021    GFRAA >60 10/08/2021    GFRAA >60 12/19/2011    AGRATIO 1.3 06/15/2021    LABGLOM >60 10/08/2021    GLUCOSE 114 10/08/2021    PROT 7.5 06/15/2021    CALCIUM 9.6 10/08/2021    BILITOT 1.4 06/15/2021    ALKPHOS 94 06/15/2021    AST 18 06/15/2021    ALT 7 06/15/2021       POC Tests:   Recent Labs     10/08/21  0634   POCGLU 110*       Coags:   Lab Results   Component Value Date    PROTIME 11.6 09/28/2020    INR 1.00 09/28/2020    APTT 30.3 02/08/2018       HCG (If Applicable): No results found for: PREGTESTUR, PREGSERUM, HCG, HCGQUANT     ABGs: No results found for: PHART, PO2ART, VAN1DOZ, AER6NYT, BEART, E0YKHHNM     Type & Screen (If Applicable):  No results found for: LABABO, LABRH    Drug/Infectious Status (If Applicable):  No results found for: HIV, HEPCAB    COVID-19 Screening (If Applicable):   Lab Results   Component Value Date    COVID19 Not Detected 10/04/2021           Anesthesia Evaluation  Patient summary reviewed no history of anesthetic complications:   Airway: Mallampati: II  TM distance: >3 FB   Neck ROM: full  Mouth opening: > = 3 FB Dental:    (+) upper dentures and lower dentures      Pulmonary: breath sounds clear to auscultation  (+) COPD:  decreased breath sounds,  asthma:     (-) wheezes                          ROS comment: Former smoker  Uses inhalers daily and proair 2-3 times per day. No recent colds or exacerbations. MORAN with walking long distances. Chronic stable.      Cardiovascular:    (+) hypertension:, dysrhythmias (MAT):,     (-) CABG/stent      Rhythm: regular  Rate: normal                 ROS comment: Last Echo: 1/9/20   Summary   -Normal left ventricle size, wall thickness, and systolic function with an   estimated ejection fraction of 55%. Grade I diastolic dysfunction with   normal LV filling pressures.   -Mild mitral regurgitation.   -The left atrium is mildly dilated.   -The aortic valve appears sclerotic but opens well.   -Mild tricuspid regurgitation with PASP of 31 mmHg.     Last Stress Test: 1/10/20  Summary    Normal myocardial perfusion.    Normal LV size and systolic function.      Last Angiogram: 9/29/20    Anatomy:   LM-normal  LAD-30% proximal and mid  Cx-normal  OM1- normal  RCA-20% mid and distal, dominant  RPDA- normal  LVEF-60%     Impression:  1.  Mild nonobstructive CAD. 2.  Normal LV systolic function. 3.  Noncardiac chest pain.     Denies angina or recent cardiac symptoms. Neuro/Psych:   (+) neuromuscular disease:,    (-) seizures, TIA and CVA           GI/Hepatic/Renal:   (+) GERD (denies gerd sx today):,           Endo/Other:    (+) Diabetes, hypothyroidism::., .                 Abdominal:             Vascular: Other Findings:             Anesthesia Plan      general     ASA 3     (Duo neb pre procedure)  Induction: intravenous. MIPS: Postoperative opioids intended and Prophylactic antiemetics administered. Anesthetic plan and risks discussed with patient. Use of blood products discussed with patient whom consented to blood products. Plan discussed with CRNA.                 Chase Fox MD   10/8/2021

## 2021-10-08 NOTE — BRIEF OP NOTE
Brief Postoperative Note      Patient: Alena Denney  YOB: 1940  MRN: [id number]    Date of Procedure: 10/8/2021    Pre-Op Diagnosis: K44.9  HIATAL HERNIA  K21.9  GASTROESOPHAGEAL REFLUX DISEASE    Post-Op Diagnosis: Same       Procedure(s):  LAPAROSCOPIC HIATAL HERNIA REPAIR, NISSEN FUNDOPLICATION    Surgeon(s):  Mirian Vallejo MD    Assistant:  Surgical Assistant: Nestor Mccartney; [facility]    Anesthesia: General    Estimated Blood Loss (mL): Minimal    Complications: None    Specimens:   ID Type Source Tests Collected by Time Destination   A : A) [facility] Mirian Vallejo MD 10/8/2021 1010        Implants:  Implant Name Type Inv.  Item Serial No.  Lot No. LRB No. Used Action   MESH DIMA [id number] SYN TISS REINF Lorelie Linus - [id number]  [address] SYN TISS REINF BIO[employer] DISP BIO-A [id number]  [employer]-  N/A 1 Implanted         Drains: * No LDAs found *    Findings: Large paraesophageal HH repaired, Nissen completed    Electronically signed by Mirian Vallejo MD on 10/8/2021 at 10:30 AM

## 2021-10-08 NOTE — PLAN OF CARE
Problem: Pain:  Description: Pain management should include both nonpharmacologic and pharmacologic interventions. Goal: Pain level will decrease  Description: Pain level will decrease  Outcome: Ongoing  Goal: Control of acute pain  Description: Control of acute pain  Outcome: Ongoing  Goal: Control of chronic pain  Description: Control of chronic pain  Outcome: Ongoing     Problem: Pain:  Description: Pain management should include both nonpharmacologic and pharmacologic interventions. Goal: Pain level will decrease  Description: Pain level will decrease  Outcome: Ongoing     Problem: Pain:  Description: Pain management should include both nonpharmacologic and pharmacologic interventions. Goal: Control of acute pain  Description: Control of acute pain  Outcome: Ongoing     Problem: Pain:  Description: Pain management should include both nonpharmacologic and pharmacologic interventions.   Goal: Control of chronic pain  Description: Control of chronic pain  Outcome: Ongoing     Problem: Falls - Risk of:  Goal: Will remain free from falls  Description: Will remain free from falls  Outcome: Ongoing  Goal: Absence of physical injury  Description: Absence of physical injury  Outcome: Ongoing     Problem: Falls - Risk of:  Goal: Will remain free from falls  Description: Will remain free from falls  Outcome: Ongoing     Problem: Falls - Risk of:  Goal: Absence of physical injury  Description: Absence of physical injury  Outcome: Ongoing

## 2021-10-08 NOTE — H&P
PATIENT NAME: Charlie Cabrera            HISTORY OF PRESENT ILLNESS:               The patient is a 80 y.o. female who presents with concerns of a hiatal hernia, has had severe sx's with GERD, regurgitation, and obstruction. Seen and treated thru [facility] initially, He[facility] and returns for further care. No prior gastric surgery, hernia present for years. No acute GIB noted. Has seen Dr. Ramo Gorman in the distant past. Had CT and UGI, no recent EGD.      Past Medical History:    Past Medical History             Diagnosis Date    Arthritis      Asthma      COPD [facility] obstructive [facility] disease[facility]      Diabetes mellitus ([facility]      Hyperlipidemia      Hypertension      Influenza 02/08/2018    Thyroid disease              Past Surgical History:    Past Surgical History             Procedure Laterality Date    COLONOSCOPY                Current Medications:   Current Hospital Medications   No current facility-administered medications for this visit. Home Medications           Prior to Admission medications    Medication Sig Start Date End Date Taking?  Authorizing Provider   atorvastatin (LIPITOR) 20 MG tablet TAKE 1 TABLET BY MOUTH DAILY 6/21/21   Yes OPAL Chapman CNP   levothyroxine (SYNTHROID) 100 MCG tablet TAKE 1 TABLET BY MOUTH DAILY 3/15/21   Yes Shirley Mina MD   fluticasone-salmeterol (ADVAIR) 250-50 MCG/DOSE AEPB Inhale 1 puff into the lungs every 12 hours 1/13/21   Yes Shirley Mina MD   fluticasone [facility]) 50 MCG/ACT nasal spray 2 sprays by Nasal route daily as needed (as needed) 12/15/20   Yes OPAL Borrego NP   albuterol sulfate  (90 Base) MCG/ACT inhaler INHALE 2 PUFFS INTO LUNGS EVERY 6 HOURS AS NEEDED FOR SHORTNESS OF BREATH OR WHEEZING 12/8/20   Yes Shirley Mina MD   valsartan-hydrochlorothiazide (DIOVAN-HCT) 80-12.5 MG per tablet Take 1 tablet by mouth daily 7/9/20   Yes Connor Tuttle Zuleika Colindres MD   ipratropium-albuterol (DUONEB) 0.5-2.5 (3) MG/3ML SOLN nebulizer solution USE 1 VIAL VIA NEBULIZER EVERY 6 HOURS 9/27/19   Yes Dalia Schwarz MD   tiotropium (SPIRIVA HANDIHALER) 18 MCG inhalation capsule Inhale 1 capsule into the lungs daily 10/5/18   Yes Lobo Puente MD   cetirizine (ZYRTEC) 10 MG tablet Take 10 mg by mouth daily     Yes Historical Provider, MD   acetaminophen (TYLENOL) 500 MG tablet Take 1,000 mg by mouth daily as needed      Yes Historical Provider, MD   omeprazole (PRILOSEC) 20 MG delayed release capsule Take 1 capsule by mouth every morning (before breakfast) 1/13/21 4/13/21   Dalia Schwarz MD            Allergies:  Aspirin, Sulfa antibiotics, and Ace inhibitors     Social History:    reports that she quit smoking about 22 years ago. Her smoking use included cigarettes. She has a 10.00 pack-year smoking history. She has never used smokeless tobacco. She reports that she does not drink alcohol and does not use drugs.     Family History:    Family History             Problem Relation Age of Onset    Heart Disease Mother      High Blood Pressure Mother      Stroke Mother      Heart Disease Sister      High Blood Pressure Sister      Cancer Brother           lung            REVIEW OF SYSTEMS:  Review of Systems   Constitutional: Negative. HENT: Negative. Eyes: Negative. Respiratory: Negative. Cardiovascular: Negative. Gastrointestinal: Negative. Endocrine: Negative. Genitourinary: Negative. Musculoskeletal: Negative. Skin: Negative. Allergic/Immunologic: Negative. Neurological: Negative. Hematological: Negative.     Psychiatric/Behavioral: Negative.          PHYSICAL EXAM:  VITALS:  Wt 185 lb (83.9 kg)   BMI 34.96 kg/m²   CONSTITUTIONAL:  alert, no apparent distress and mildly obese  EYES:  sclera clear  ENT:  normocepalic, without obvious abnormality  NECK:  supple, symmetrical, trachea midline   LUNGS:  clear to auscultation  CARDIOVASCULAR:  regular rate and rhythm and no murmur noted  ABDOMEN:  no scars, normal bowel sounds, soft, non-distended, non-tender, voluntary guarding absent, no masses palpated, no hepatosplenomegally and hernia absent  MUSCULOSKELETAL:  0+ pitting edema lower extremities  NEUROLOGIC:  Mental Status Exam:  Level of Alertness:   awake  Orientation:   person, place, time  SKIN:  no bruising or bleeding, normal skin color, texture, turgor and no redness, warmth, or swelling           Radiology Review:      Delayed gastric emptying is identified. This appears to be related to a combination of markedly decreased peristalsis as well as some possible mass effect/compression of the gastric pylorus related to the patient's large hiatal hernia. Large hiatal hernia again demonstrated. The fundus as well as the majority of the body of the stomach are contained within the hernia. Portions of the body as well as the gastric antrum and pylorus remains subdiaphragmatic. Narrative     HISTORY:  eval hiatal hernia  eval hiatal hernia   COMPARISON: 6/28/2021, 6/27/2021   FLUOROSCOPY TIME: 2 minutes 55 seconds   TECHNIQUE:  Contrast was administered with fluoroscopic visualization and spot images   NOTE:  If there are questions about the content of this report, please contact [address] [place] [address] radiology by calling [phone]     FINDINGS:     HYPOPHARYNX:  No diverticulum, narrowing, or leak   ESOPHAGUS:  Decreased peristalsis involving the distal third of the esophagus and occasional tertiary contractions noted. Findings compatible with mild esophageal dysmotility involving the distal third of the esophagus. No mucosal irregularity,   narrowing, diverticulum, leak or obstruction   STOMACH:  Nasogastric tube present, tip located within the fundus of the stomach. Large hiatal hernia is again identified. This contains the fundus as well as a large portion of the body of the stomach.  Portions of the body as well as the gastric antrum   remain below the diaphragm. Pylorus and duodenum remain subdiaphragmatic. Delayed gastric emptying is noted. This appears to be related to a combination of markedly decreased peristalsis and possibly some mild mass effect/compression of the gastric   pylorus. PROXIMAL SMALL BOWEL:  Small bowel normal in caliber and appearance. BARIUM TABLET:  Not administered   OTHER: None        Large hiatal hernia with compression of the gastric pylorus/proximal duodenum, suggestive of gastric outlet obstruction. Large cystic lesion posterior to the uterus measures up to 6.8 cm and is favored to arise from the left ovary. This is incompletely evaluated by CT. A nonemergent pelvic ultrasound could be considered for further evaluation. Suggestion of hepatic steatosis. Cholelithiasis. These results were discussed with Corby Jones at 8:58 PM.  Narrative     HISTORY:   Bowel obstruction, high-grade pain fever   COMPARISON:  None   TECHNIQUE: Post IV contrast multiplanar CT images of the abdomen and pelvis   NOTE:  If there are questions about the content of this report, please contact [facility] radiology by calling [phone]     FINDINGS:   LOWER CHEST:  A large hiatal hernia is noted. Of note, there is significant compression of the gastric pylorus/proximal duodenum on axial image 12 of series 904, which is suggestive of gastric outlet obstruction. Prominent coronary calcifications. LIVER: Daphane Flies is suggestion of mild hepatic steatosis. GALLBLADDER/BILE DUCTS:  Gallstones in the gallbladder without gallbladder distention, wall thickening, or pericholecystic fluid   PANCREAS:  Unremarkable.  No mass or duct dilation   SPLEEN:  Unremarkable   ADRENALS:  Unremarkable     KIDNEYS/URETERS:  Unremarkable.  No hydronephrosis, stone, or suspicious mass   GI TRACT:  Large hiatal hernia as described. No additional significant abnormality in the remaining loops of small and large bowel.    VESSELS:  Moderate atherosclerotic calcifications without aneurysm or dissection   LYMPH NODES: Unremarkable.  No enlarged lymph nodes   ABD WALL:  Unremarkable   PELVIS:  A large cystic lesion posterior to the uterus is up to 6.8 cm and is favored to arise from the left ovary. BONES:  Chronic degenerative changes of the spine. Scattered hemangiomas. OTHER:  None  Other Result Text     Interface, Rad Results In - 06/27/2021  9:02 PM EDT   Formatting of this note might be different from the original.   HISTORY:   Bowel obstruction, high-grade pain fever   COMPARISON:  None   TECHNIQUE: Post IV contrast multiplanar CT images of the abdomen and pelvis   NOTE:  If there are questions about the content of this report, please contact [address] [place] [address] radiology by calling [phone]     FINDINGS:   LOWER CHEST:  A large hiatal hernia is noted. Of note, there is significant compression of the gastric pylorus/proximal duodenum on axial image 12 of series 904, which is suggestive of gastric outlet obstruction. Prominent coronary calcifications. LIVER: Spurger Fish is suggestion of mild hepatic steatosis. GALLBLADDER/BILE DUCTS:  Gallstones in the gallbladder without gallbladder distention, wall thickening, or pericholecystic fluid   PANCREAS:  Unremarkable.  No mass or duct dilation   SPLEEN:  Unremarkable   ADRENALS:  Unremarkable     KIDNEYS/URETERS:  Unremarkable.  No hydronephrosis, stone, or suspicious mass    GI TRACT:  Large hiatal hernia as described. No additional significant abnormality in the remaining loops of small and large bowel. VESSELS:  Moderate atherosclerotic calcifications without aneurysm or dissection   LYMPH NODES: Unremarkable.  No enlarged lymph nodes   ABD WALL:  Unremarkable   PELVIS:  A large cystic lesion posterior to the uterus is up to 6.8 cm and is favored to arise from the left ovary. BONES:  Chronic degenerative changes of the spine. Scattered hemangiomas.    OTHER:  None     IMPRESSION       Large hiatal hernia with compression of the gastric pylorus/proximal duodenum, suggestive of gastric outlet obstruction. Large cystic lesion posterior to the uterus measures up to 6.8 cm and is favored to arise from the left ovary. This is incompletely evaluated by CT. A nonemergent pelvic ultrasound could be considered for further evaluation. Suggestion of hepatic steatosis. Cholelithiasis. These results were discussed with Sahil Arguello at 8:58 PM.      Date: 06/27/21   Received From: [facility]               IMPRESSION/RECOMMENDATIONS:     Large hiatal hernia, intrathoracic stomach with acute on chronic symptomatology  Will benefit from repair       The problem and plan were discussed in detail with the patient today.  All questions have been answered, and they agree to proceed.     Thank you,        Ned Crowe MD

## 2021-10-08 NOTE — OP NOTE
[facility]                     [facility], [address] [place] [address]                                OPERATIVE REPORT    PATIENT NAME: Jazmyn Berumen                    :        1940  MED REC NO:   [id number]                          ROOM:       7235  ACCOUNT NO:   [de-identified]                           ADMIT DATE: 10/08/2021  PROVIDER:     Janae Champagne MD    DATE OF PROCEDURE:  10/08/2021    PREOPERATIVE DIAGNOSES:  Paraesophageal hiatal hernia, gastroesophageal  reflux disease, regurgitation and dysphagia with obstruction. POSTOPERATIVE DIAGNOSES:  Paraesophageal hiatal hernia, gastroesophageal  reflux disease, regurgitation and dysphagia with obstruction. PROCEDURE:  Laparoscopic repair of paraesophageal hiatal hernia,  reduction of intrathoracic stomach, Dorothy Bio-A mesh reinforcement of  hiatal repair and [id number] Nissen fundoplication. SURGEON:  Janae Champagne MD    ANESTHESIA:  General plus local.    ESTIMATED BLOOD LOSS:  Minimal.    COMPLICATIONS:  None. SPECIMENS:  Hernia sac. INDICATION:  The patient is a 29-year-old female with a longstanding  history of large hiatal hernia and intrathoracic stomach. The patient  had severe symptoms with GERD, regurgitation and obstruction. She was  seen at [facility] once prior for obstruction in the recent past as  well. The patient has had upper GI EGD and manometry completed for her  workup and presents for repair today. All questions were answered with  the patient. Antibiotics were ordered and administered. She consents  to proceed. ADDITIONAL DETAILS OF SURGERY:  The patient was brought to the operating  room and placed on the operating table in the supine position. Compression boots were placed. General anesthetic was administered. The abdomen was prepped and draped sterilely and time-out was done.    Supraumbilical 5-mm direct entry view port was placed and the esophagus further circumferentially, again  freeing it up and avoiding the vagus nerves. The hernia sac was then  taken off of the stomach anteriorly into the left to the fundus  unrolling this area and removing the hernia sac again starting just to  the patient's left of the anterior vagus and preserving this structure. The patient also had some lobulated fat on the right side, which was  dissected down and this was removed with the hernia sac and sent to  pathology. Repair of the hiatus was then performed. This was done with the  0-Ethibond sutures placing them in an interrupted fashion, securing each  one with a tie-knot clip. This was done to the size of a 56-Azeri  bougie with ability to admit a grasper up into the mediastinum through  the hiatus. Repair was then reinforced with the Nazlini Bio-A mesh which  was positioned and sutured to the common crura closure, the right crura,  the left crura with an 0-Ethibond secured with a tie-knot clip at each  site. The wrap was then performed over a 56-Azeri bougie. Three  sutures were placed. The left side of the fundus, the GE junction and  then the fundus which had been passed retroesophageally were sewn  together with the first two and the third suture was fundus to fundus. Each 0-Ethibond was secured with a tie-knot clip. The wrap was 2.5 cm  in length. It sat nicely in the upper abdomen and all surgical sites  appeared hemostatic. Liver retractor was removed. The undersurface of  the liver appeared hemostatic. The 11-mm port site was inspected. The  port was removed and the fascia was closed with PMI catheter and 0  Vicryl ties x2 at this site. Skin was closed at all sites with 4-0  Monocryl suture. Dermabond glue was placed. The patient was taken to  recovery room in stable condition postop.         Jet Leger MD    D: 10/08/2021 10:55:13       T: 10/08/2021 10:59:53     [email or web address]  Job#: [id number]     Doc#: [id number]    CC:  Diana Nunes MD Pamella Butt Md

## 2021-10-08 NOTE — ANESTHESIA POSTPROCEDURE EVALUATION
Department of Anesthesiology  Postprocedure Note    Patient: Alex Henning  MRN: [id number]  YOB: 1940  Date of evaluation: 10/8/2021  Time:  11:50 AM     Procedure Summary     Date: 10/08/21 Room / Location: [address]    Anesthesia Start: 0730 Anesthesia Stop: 3502    Procedure: LAPAROSCOPIC HIATAL HERNIA REPAIR, NISSEN FUNDOPLICATION (N/A Abdomen) Diagnosis:       (K44.9  HIATAL HERNIA)      (K21.9  GASTROESOPHAGEAL REFLUX DISEASE)    Surgeons: Bhavani Michelle MD Responsible Provider: Sujit Avelar MD    Anesthesia Type: general ASA Status: 3          Anesthesia Type: general    Jeremy Phase I: Jeremy Score: 9    Jeremy Phase II:      Last vitals: Reviewed and per EMR flowsheets.        Anesthesia Post Evaluation    Patient location during evaluation: PACU  Patient participation: complete - patient participated  Level of consciousness: awake  Airway patency: patent  Nausea & Vomiting: no vomiting  Complications: no  Cardiovascular status: hemodynamically stable  Respiratory status: acceptable  Hydration status: euvolemic  Multimodal analgesia pain management approach

## 2021-10-08 NOTE — CARE COORDINATION
Patient admitted as Observation/OPIB with an anticipated short hospitalization length of stay. Chart reviewed and it appears that patient has minimal needs for discharge at this time. Discussed with patients nurse and requested that case management be notified if discharge needs are identified. *Case management will continue to follow progress and update discharge plan as needed.     Electronically signed by Gianna Westbrook RN on 10/8/2021 at 4:21 PM

## 2021-10-09 VITALS
DIASTOLIC BLOOD PRESSURE: 70 MMHG | SYSTOLIC BLOOD PRESSURE: 121 MMHG | OXYGEN SATURATION: 91 % | WEIGHT: 178 LBS | TEMPERATURE: 97.9 F | HEIGHT: 61 IN | BODY MASS INDEX: 33.61 KG/M2 | RESPIRATION RATE: 16 BRPM | HEART RATE: 82 BPM

## 2021-10-09 PROBLEM — K44.9 LARGE HIATAL HERNIA: Status: ACTIVE | Noted: 2021-10-09

## 2021-10-09 LAB
ANION GAP SERPL CALCULATED.3IONS-SCNC: 8 MMOL/L (ref 3–16)
BASOPHILS ABSOLUTE: 0 K/UL (ref 0–0.2)
BASOPHILS RELATIVE PERCENT: 0.1 %
BUN BLDV-MCNC: 14 MG/DL (ref 7–20)
CALCIUM SERPL-MCNC: 8.6 MG/DL (ref 8.3–10.6)
CHLORIDE BLD-SCNC: 102 MMOL/L (ref 99–110)
CO2: 25 MMOL/L (ref 21–32)
CREAT SERPL-MCNC: 0.5 MG/DL (ref 0.6–1.2)
EOSINOPHILS ABSOLUTE: 0 K/UL (ref 0–0.6)
EOSINOPHILS RELATIVE PERCENT: 0.1 %
GFR AFRICAN AMERICAN: >60
GFR NON-AFRICAN AMERICAN: >60
GLUCOSE BLD-MCNC: 128 MG/DL (ref 70–99)
GLUCOSE BLD-MCNC: 133 MG/DL (ref 70–99)
GLUCOSE BLD-MCNC: 135 MG/DL (ref 70–99)
GLUCOSE BLD-MCNC: 139 MG/DL (ref 70–99)
HCT VFR BLD CALC: 39.5 % (ref 36–48)
HEMOGLOBIN: 13.3 G/DL (ref 12–16)
LYMPHOCYTES ABSOLUTE: 1.2 K/UL (ref 1–5.1)
LYMPHOCYTES RELATIVE PERCENT: 12.1 %
MCH RBC QN AUTO: 29.7 PG (ref 26–34)
MCHC RBC AUTO-ENTMCNC: 33.6 G/DL (ref 31–36)
MCV RBC AUTO: 88.4 FL (ref 80–100)
MONOCYTES ABSOLUTE: 0.9 K/UL (ref 0–1.3)
MONOCYTES RELATIVE PERCENT: 9.4 %
NEUTROPHILS ABSOLUTE: 7.8 K/UL (ref 1.7–7.7)
NEUTROPHILS RELATIVE PERCENT: 78.3 %
PDW BLD-RTO: 13.6 % (ref 12.4–15.4)
PERFORMED ON: ABNORMAL
PLATELET # BLD: 247 K/UL (ref 135–450)
PMV BLD AUTO: 8.5 FL (ref 5–10.5)
POTASSIUM SERPL-SCNC: 4.2 MMOL/L (ref 3.5–5.1)
RBC # BLD: 4.47 M/UL (ref 4–5.2)
SODIUM BLD-SCNC: 135 MMOL/L (ref 136–145)
WBC # BLD: 9.9 K/UL (ref 4–11)

## 2021-10-09 PROCEDURE — 96374 THER/PROPH/DIAG INJ IV PUSH: CPT

## 2021-10-09 PROCEDURE — G0378 HOSPITAL OBSERVATION PER HR: HCPCS

## 2021-10-09 PROCEDURE — 80048 BASIC METABOLIC PNL TOTAL CA: CPT

## 2021-10-09 PROCEDURE — 94761 N-INVAS EAR/PLS OXIMETRY MLT: CPT

## 2021-10-09 PROCEDURE — 2580000003 HC RX 258: Performed by: SURGERY

## 2021-10-09 PROCEDURE — 6360000002 HC RX W HCPCS: Performed by: SURGERY

## 2021-10-09 PROCEDURE — 99024 POSTOP FOLLOW-UP VISIT: CPT | Performed by: SURGERY

## 2021-10-09 PROCEDURE — 94640 AIRWAY INHALATION TREATMENT: CPT

## 2021-10-09 PROCEDURE — 6370000000 HC RX 637 (ALT 250 FOR IP): Performed by: SURGERY

## 2021-10-09 PROCEDURE — 2700000000 HC OXYGEN THERAPY PER DAY

## 2021-10-09 PROCEDURE — 96372 THER/PROPH/DIAG INJ SC/IM: CPT

## 2021-10-09 PROCEDURE — 36415 COLL VENOUS BLD VENIPUNCTURE: CPT

## 2021-10-09 PROCEDURE — 85025 COMPLETE CBC W/AUTO DIFF WBC: CPT

## 2021-10-09 PROCEDURE — 1200000000 HC SEMI PRIVATE

## 2021-10-09 RX ORDER — HYDROCODONE BITARTRATE AND ACETAMINOPHEN 5; 325 MG/1; MG/1
1-2 TABLET ORAL EVERY 6 HOURS PRN
Qty: 15 TABLET | Refills: 0 | Status: SHIPPED | OUTPATIENT
Start: 2021-10-09 | End: 2021-10-12

## 2021-10-09 RX ADMIN — ENOXAPARIN SODIUM 40 MG: 40 INJECTION SUBCUTANEOUS at 09:29

## 2021-10-09 RX ADMIN — TIOTROPIUM BROMIDE INHALATION SPRAY 2 PUFF: 3.12 SPRAY, METERED RESPIRATORY (INHALATION) at 11:20

## 2021-10-09 RX ADMIN — HYDROCHLOROTHIAZIDE 12.5 MG: 25 TABLET ORAL at 09:27

## 2021-10-09 RX ADMIN — IPRATROPIUM BROMIDE AND ALBUTEROL SULFATE 1 AMPULE: .5; 3 SOLUTION RESPIRATORY (INHALATION) at 17:32

## 2021-10-09 RX ADMIN — Medication 2 PUFF: at 11:19

## 2021-10-09 RX ADMIN — METOCLOPRAMIDE 10 MG: 5 INJECTION, SOLUTION INTRAMUSCULAR; INTRAVENOUS at 07:50

## 2021-10-09 RX ADMIN — ACETAMINOPHEN 1000 MG: 500 TABLET ORAL at 05:16

## 2021-10-09 RX ADMIN — SODIUM CHLORIDE, PRESERVATIVE FREE 10 ML: 5 INJECTION INTRAVENOUS at 11:55

## 2021-10-09 RX ADMIN — PANTOPRAZOLE SODIUM 40 MG: 40 TABLET, DELAYED RELEASE ORAL at 05:17

## 2021-10-09 RX ADMIN — METOCLOPRAMIDE 10 MG: 5 INJECTION, SOLUTION INTRAMUSCULAR; INTRAVENOUS at 11:54

## 2021-10-09 RX ADMIN — ATORVASTATIN CALCIUM 10 MG: 10 TABLET, FILM COATED ORAL at 09:27

## 2021-10-09 RX ADMIN — IPRATROPIUM BROMIDE AND ALBUTEROL SULFATE 1 AMPULE: .5; 3 SOLUTION RESPIRATORY (INHALATION) at 11:18

## 2021-10-09 RX ADMIN — VALSARTAN 80 MG: 80 TABLET ORAL at 09:26

## 2021-10-09 RX ADMIN — LEVOTHYROXINE SODIUM 100 MCG: 0.1 TABLET ORAL at 05:17

## 2021-10-09 RX ADMIN — CETIRIZINE HYDROCHLORIDE 10 MG: 10 TABLET, FILM COATED ORAL at 09:27

## 2021-10-09 RX ADMIN — ACETAMINOPHEN 1000 MG: 500 TABLET ORAL at 11:53

## 2021-10-09 ASSESSMENT — PAIN DESCRIPTION - PAIN TYPE
TYPE: ACUTE PAIN
TYPE: ACUTE PAIN

## 2021-10-09 ASSESSMENT — PAIN SCALES - GENERAL
PAINLEVEL_OUTOF10: 8
PAINLEVEL_OUTOF10: 4
PAINLEVEL_OUTOF10: 8
PAINLEVEL_OUTOF10: 4
PAINLEVEL_OUTOF10: 0

## 2021-10-09 ASSESSMENT — PAIN DESCRIPTION - LOCATION
LOCATION: NECK
LOCATION: NECK

## 2021-10-09 ASSESSMENT — PAIN DESCRIPTION - DESCRIPTORS
DESCRIPTORS: SORE
DESCRIPTORS: SORE

## 2021-10-09 ASSESSMENT — PAIN DESCRIPTION - ORIENTATION
ORIENTATION: OUTER
ORIENTATION: OUTER

## 2021-10-09 NOTE — PLAN OF CARE
Problem: Pain:  Goal: Pain level will decrease  Description: Pain level will decrease  10/9/2021 1853 by Agapito Mcarthur RN  Outcome: Completed  10/9/2021 1317 by Agapito Mcarthur RN  Outcome: Ongoing  Goal: Control of acute pain  Description: Control of acute pain  10/9/2021 1853 by Agapito Mcarthur RN  Outcome: Completed  10/9/2021 1317 by Agapito Mcarthur RN  Outcome: Ongoing  Goal: Control of chronic pain  Description: Control of chronic pain  10/9/2021 1853 by Agapito Mcarthur RN  Outcome: Completed  10/9/2021 1317 by Agapito Mcarthur RN  Outcome: Ongoing     Problem: Falls - Risk of:  Goal: Will remain free from falls  Description: Will remain free from falls  10/9/2021 1853 by Agapito Mcarthur RN  Outcome: Completed  10/9/2021 1317 by Agapito Mcarthur RN  Outcome: Ongoing  Goal: Absence of physical injury  Description: Absence of physical injury  10/9/2021 1853 by Agapito Mcarthur RN  Outcome: Completed  10/9/2021 1317 by Agapito Mcarthur RN  Outcome: Ongoing

## 2021-10-09 NOTE — PROGRESS NOTES
Assessment completed. Patient alert and oriented and able to make all her needs known. Daughter at bedside. Sx incisions to abd CDI, no drainage noted and well approximated. POC and education reviewed with patient. Medications administered as ordered. All needs met at this time. Call light in reach. Will continue to monitor. 1630: Patient ambulating to bathroom with SBA, on room air, diet upgraded to full liquids and tolerating well. 1825: Discharge instructions reviewed with patient and daughter. All questions answered. 1845: Patient discharged via w/c with all personal belongings.

## 2021-10-09 NOTE — PROGRESS NOTES
Robyn Jaime is a 80 y.o. female patient.     Current Facility-Administered Medications   Medication Dose Route Frequency Provider Last Rate Last Admin    acetaminophen (TYLENOL) tablet 1,000 mg  1,000 mg Oral Q8H Feliz Block MD   1,000 mg at 10/09/21 0516    albuterol sulfate  (90 Base) MCG/ACT inhaler 2 puff  2 puff Inhalation Q6H PRN Feliz Block MD        atorvastatin (LIPITOR) tablet 10 mg  10 mg Oral Daily Feliz Block MD   10 mg at 10/09/21 7330    cetirizine (ZYRTEC) tablet 10 mg  10 mg Oral Daily Feliz Block MD   10 mg at 10/09/21 3551    budesonide-formoterol (SYMBICORT) 80-4.5 MCG/ACT inhaler 2 puff  2 puff Inhalation BID Feliz Block MD        ipratropium-albuterol (DUONEB) nebulizer solution 1 ampule  1 ampule Inhalation 4x daily Feliz Block MD        levothyroxine (SYNTHROID) tablet 100 mcg  100 mcg Oral Daily Feliz Block MD   100 mcg at 10/09/21 0517    pantoprazole (PROTONIX) tablet 40 mg  40 mg Oral QAM AC Feliz Block MD   40 mg at 10/09/21 0517    tiotropium (SPIRIVA RESPIMAT) 2.5 MCG/ACT inhaler 2 puff  2 puff Inhalation Daily Feliz Block MD        dextrose 5 % and 0.45 % NaCl with KCl 20 mEq infusion   IntraVENous Continuous Feliz Block MD 75 mL/hr at 10/08/21 1118 New Bag at 10/08/21 1118    sodium chloride flush 0.9 % injection 5-40 mL  5-40 mL IntraVENous 2 times per day Feliz Block MD        sodium chloride flush 0.9 % injection 5-40 mL  5-40 mL IntraVENous PRN Feliz Block MD        0.9 % sodium chloride infusion  25 mL IntraVENous PRN Feliz Block MD        enoxaparin (LOVENOX) injection 40 mg  40 mg SubCUTAneous Daily Feliz Block MD   40 mg at 10/09/21 0929    oxyCODONE (ROXICODONE) immediate release tablet 5 mg  5 mg Oral Q4H PRN Feliz Block MD   5 mg at 10/08/21 1928    morphine (PF) injection 2 mg  2 mg IntraVENous Q2H PRN Kvng Ross MD   2 mg at 10/08/21 1737    Or    morphine injection 4 mg  4 mg IntraVENous Q2H PRN Kvng Ross MD        ondansetron [facility]) injection 4 mg  4 mg IntraVENous Q4H PRN Kvng Ross MD        metoclopramide [facility]) injection 10 mg  10 mg IntraVENous Q6H Kvng Ross MD   10 mg at 10/09/21 0750    ketorolac (TORADOL) injection 15 mg  15 mg IntraVENous Q6H PRN Kvng Ross MD   15 mg at 10/08/21 1218    albuterol (PROVENTIL) nebulizer solution 2.5 mg  2.5 mg Nebulization Once Daylin MD Marion        valsartan (DIOVAN) tablet 80 mg  80 mg Oral Daily Kvng Ross MD   80 mg at 10/09/21 8226    And    hydroCHLOROthiazide (HYDRODIURIL) tablet 12.5 mg  12.5 mg Oral Daily Kvng Ross MD   12.5 mg at 10/09/21 9839     Allergies   Allergen Reactions    Aspirin      Recurrent GI blood Loss?  Sulfa Antibiotics      Other reaction(s): Other (See Comments)  hallucinations  hallucinations    Ace Inhibitors Palpitations     Swelling and cough  cough     Active Problems:    Paraesophageal hiatal hernia  Resolved Problems:    * No resolved hospital problems. *    Blood pressure (!) 148/81, pulse 85, temperature 97.7 °F (36.5 °C), temperature source Oral, resp. rate 16, height 5' 1\" (1.549 m), weight 178 lb (80.7 kg), SpO2 95 %, not currently breastfeeding. Subjective:  Symptoms:  Stable. Diet:  Dietary issues: Tolerating clear liquids. Activity level: Returning to normal.    Pain:  She complains of pain that is mild. Objective:  General Appearance:  Comfortable. Vital signs: (most recent): Blood pressure (!) 148/81, pulse 85, temperature 97.7 °F (36.5 °C), temperature source Oral, resp. rate 16, height 5' 1\" (1.549 m), weight 178 lb (80.7 kg), SpO2 95 %, not currently breastfeeding. Output: Producing urine. Lungs:  Normal effort and normal respiratory rate. Abdomen: Abdomen is soft and non-distended. (Incisions approximated without evidence of infection. Moderate ecchymosis surrounding left upper quadrant incision ). Neurological: Patient is alert and oriented to person, place and time. Skin:  Warm. Assessment & Plan postop day #1 status post laparoscopic repair of hiatal hernia. Doing fairly well. Tolerating a clear liquid diet. Still requiring 3 L of oxygen via nasal cannula. Will advance to full liquid diet. Wean O2. Possible p.m. discharge.     Steve Hendrix MD  10/9/2021

## 2021-10-11 ENCOUNTER — TELEPHONE (OUTPATIENT)
Dept: FAMILY MEDICINE CLINIC | Age: 81
End: 2021-10-11

## 2021-10-11 ENCOUNTER — CARE COORDINATION (OUTPATIENT)
Dept: CASE MANAGEMENT | Age: 81
End: 2021-10-11

## 2021-10-11 VITALS
SYSTOLIC BLOOD PRESSURE: 149 MMHG | OXYGEN SATURATION: 97 % | TEMPERATURE: 97.5 F | RESPIRATION RATE: 18 BRPM | HEART RATE: 88 BPM | DIASTOLIC BLOOD PRESSURE: 82 MMHG

## 2021-10-11 DIAGNOSIS — K44.9 LARGE HIATAL HERNIA: Primary | ICD-10-CM

## 2021-10-11 PROCEDURE — 1111F DSCHRG MED/CURRENT MED MERGE: CPT | Performed by: FAMILY MEDICINE

## 2021-10-11 PROCEDURE — 93005 ELECTROCARDIOGRAM TRACING: CPT | Performed by: EMERGENCY MEDICINE

## 2021-10-11 NOTE — DISCHARGE SUMMARY
[facility]                     [facility], [address]                               DISCHARGE SUMMARY    PATIENT NAME: Kari Pace                    :        1940  MED REC NO:   [id number]                          ROOM:       4469  ACCOUNT NO:   [de-identified]                           ADMIT DATE: 10/08/2021  PROVIDER:     Santhohs Goodman MD                  DISCHARGE DATE:  10/09/2021    ADMITTING DIAGNOSIS:  Paraesophageal hiatal hernia. POSTOPERATIVE DIAGNOSIS:  Paraesophageal hiatal hernia. PROCEDURE:  Laparoscopic repair of paraesophageal hiatal hernia with  Nissen fundoplication. CONDITION UPON DISCHARGE:  Stable. HOSPITAL COURSE:  The patient is a 79-year-old female with a  paraesophageal hiatal hernia and gastroesophageal reflux disease, who  underwent laparoscopic repair of paraesophageal hiatal hernia with  Nissen fundoplication per Dr. Piedad Bhatia on 10/08/2021. The following  day, she was doing well and was discharged home. She was discharged  home with a prescription for oral pain medications and was given  instructions on a full liquid diet. She will follow up with Dr. Piedad Bhatia in the office in two weeks. Emi Mendez.  Fred Rodriguez MD    D: 10/10/2021 12:56:36       T: 10/10/2021 12:59:53     [email or web address]  Job#: [id number]     Doc#: [id number]    CC:

## 2021-10-11 NOTE — CARE COORDINATION
[facility] Transitions Initial Follow Up Call:  Patient reports that she is doing well, denies any redness, swelling, drainage at incision sites and is afebrile. Discussed discharge instructions and reviewed medications, 1111F completed. She will call surgeon and PCP to schedule follow up. Patient denies any questions or concerns at this time. CTN will continue with outreach follow up calls. Call within 2 business days of discharge: Yes    Patient: Nataly Jimenez Patient : 1940   MRN: [id number]  Reason for Admission: Laparoscopic repair of paraesophageal hiatal hernia,  Discharge Date: 10/9/21 RARS: Readmission Risk Score: 6      Last Discharge [address] [address]       Complaint Diagnosis Description Type Department Provider    10/8/21  Paraesophageal hiatal hernia Admission (Discharged) Lamine Barnes MD           Spoke with: Nataly Jimenez      Transitions of Care Initial Call    Was this an external facility discharge? No Discharge Facility:     Challenges to be reviewed by the provider   Additional needs identified to be addressed with provider: No  none             Method of communication with provider : none      Advance Care Planning:   Does patient have an Advance Directive: reviewed and current, reviewed and needs to be updated, not on file; education provided, not on file, patient declined education, decision maker updated and referral to internal ACP facilitator. Was this a readmission? No  Patient stated reason for admission: Laparoscopic repair of paraesophageal hiatal hernia,  Patients top risk factors for readmission: medical condition-.    Care Transition Nurse (CTN) contacted the patient by telephone to perform post hospital discharge assessment. Verified name and  with patient as identifiers. Provided introduction to self, and explanation of the CTN role.      CTN reviewed discharge instructions, medical action plan and red flags with patient who verbalized understanding. Patient given an opportunity to ask questions and does not have any further questions or concerns at this time. Were discharge instructions available to patient? Yes. Reviewed appropriate site of care based on symptoms and resources available to patient including: PCP, Urgent care clinics and When to call 911. The patient agrees to contact the PCP office for questions related to their healthcare. Medication reconciliation was performed with patient, who verbalizes understanding of administration of home medications. Advised obtaining a 90-day supply of all daily and as-needed medications. Covid Risk Education     Educated patient about risk for severe COVID-19 due to risk factors according to CDC guidelines. CTN reviewed discharge instructions, medical action plan and red flag symptoms with the patient who verbalized understanding. Discussed COVID vaccination status: Yes. Education provided on COVID-19 vaccination as appropriate. Discussed exposure protocols and quarantine with CDC Guidelines. Patient was given an opportunity to verbalize any questions and concerns and agrees to contact CTN or health care provider for questions related to their healthcare. Reviewed and educated patient on any new and changed medications related to discharge diagnosis. Was patient discharged with a pulse oximeter? No       CTN provided contact information. Plan for follow-up call in 5-7 days based on severity of symptoms and risk factors.   Plan for next call: symptom management-., self management-. and follow up appointment-.          Care Transitions 24 Hour Call    Do you have any ongoing symptoms?: No  Do you have a copy of your discharge instructions?: Yes  Do you have all of your prescriptions and are they filled?: Yes  Have you been contacted by a [address] [address] Pharmacist?: No  Have you scheduled your follow up appointment?: No  Were you discharged with any Home Care or Post Acute Services: No  Do you feel like you have everything you need to keep you well at home?: Yes  Care Transitions Interventions         Follow Up  Future Appointments   Date Time Provider Dino Hilton   11/16/2021 11:15 AM Shakira Cano  Marbin Urbina RN

## 2021-10-12 ENCOUNTER — HOSPITAL ENCOUNTER (EMERGENCY)
Age: 81
Discharge: LEFT BEFORE DISCHARGE | End: 2021-10-12
Payer: MEDICARE

## 2021-10-12 LAB
EKG ATRIAL RATE: 88 BPM
EKG DIAGNOSIS: NORMAL
EKG P AXIS: 78 DEGREES
EKG P-R INTERVAL: 156 MS
EKG Q-T INTERVAL: 354 MS
EKG QRS DURATION: 94 MS
EKG QTC CALCULATION (BAZETT): 428 MS
EKG R AXIS: -7 DEGREES
EKG T AXIS: 22 DEGREES
EKG VENTRICULAR RATE: 88 BPM

## 2021-10-12 PROCEDURE — 93010 ELECTROCARDIOGRAM REPORT: CPT | Performed by: INTERNAL MEDICINE

## 2021-10-12 NOTE — ED PROVIDER NOTES
EKG is reviewed myself. Dated today at 2306. Rate 88 sinus rhythm normal EKG. There is no significant change compared to 20 September 2020.      Rosa Goodrich MD  10/11/21 [id number]

## 2021-10-12 NOTE — TELEPHONE ENCOUNTER
Son called into on call service with concern for shortness of breath. Was told at hospital discharge on recent surgery to discontinue all inhalers including Spiriva, Advair, Duoneb and Albuterol. Per son unsure as to why, but she is having some SOB in relation to her COPD/Asthma now. I advised it was okay to use the nebulizer and PRN albuterol tonight and will send to Dr. Jeovany Nowak to advise on the Advair and Spiriva.

## 2021-10-13 ENCOUNTER — OFFICE VISIT (OUTPATIENT)
Dept: FAMILY MEDICINE CLINIC | Age: 81
End: 2021-10-13
Payer: MEDICARE

## 2021-10-13 VITALS
DIASTOLIC BLOOD PRESSURE: 80 MMHG | OXYGEN SATURATION: 96 % | BODY MASS INDEX: 33.82 KG/M2 | HEART RATE: 98 BPM | WEIGHT: 179 LBS | TEMPERATURE: 97 F | SYSTOLIC BLOOD PRESSURE: 118 MMHG

## 2021-10-13 DIAGNOSIS — R60.0 LOCALIZED EDEMA: ICD-10-CM

## 2021-10-13 DIAGNOSIS — J45.40 MODERATE PERSISTENT ASTHMA WITHOUT COMPLICATION: Primary | ICD-10-CM

## 2021-10-13 PROCEDURE — 1111F DSCHRG MED/CURRENT MED MERGE: CPT | Performed by: FAMILY MEDICINE

## 2021-10-13 PROCEDURE — G8484 FLU IMMUNIZE NO ADMIN: HCPCS | Performed by: FAMILY MEDICINE

## 2021-10-13 PROCEDURE — 1090F PRES/ABSN URINE INCON ASSESS: CPT | Performed by: FAMILY MEDICINE

## 2021-10-13 PROCEDURE — 4040F PNEUMOC VAC/ADMIN/RCVD: CPT | Performed by: FAMILY MEDICINE

## 2021-10-13 PROCEDURE — 1123F ACP DISCUSS/DSCN MKR DOCD: CPT | Performed by: FAMILY MEDICINE

## 2021-10-13 PROCEDURE — G8427 DOCREV CUR MEDS BY ELIG CLIN: HCPCS | Performed by: FAMILY MEDICINE

## 2021-10-13 PROCEDURE — 99214 OFFICE O/P EST MOD 30 MIN: CPT | Performed by: FAMILY MEDICINE

## 2021-10-13 PROCEDURE — G8399 PT W/DXA RESULTS DOCUMENT: HCPCS | Performed by: FAMILY MEDICINE

## 2021-10-13 PROCEDURE — G8417 CALC BMI ABV UP PARAM F/U: HCPCS | Performed by: FAMILY MEDICINE

## 2021-10-13 PROCEDURE — 1036F TOBACCO NON-USER: CPT | Performed by: FAMILY MEDICINE

## 2021-10-13 RX ORDER — ALBUTEROL SULFATE 90 UG/1
2 INHALANT RESPIRATORY (INHALATION) EVERY 6 HOURS PRN
COMMUNITY
End: 2021-10-13 | Stop reason: SDUPTHER

## 2021-10-13 RX ORDER — POTASSIUM CHLORIDE 750 MG/1
10 TABLET, EXTENDED RELEASE ORAL DAILY
Qty: 30 TABLET | Refills: 0 | Status: SHIPPED | OUTPATIENT
Start: 2021-10-13 | End: 2021-11-04

## 2021-10-13 RX ORDER — FUROSEMIDE 20 MG/1
20 TABLET ORAL DAILY
Qty: 30 TABLET | Refills: 0 | Status: SHIPPED | OUTPATIENT
Start: 2021-10-13 | End: 2021-11-04

## 2021-10-13 RX ORDER — ALBUTEROL SULFATE 90 UG/1
2 INHALANT RESPIRATORY (INHALATION) EVERY 6 HOURS PRN
Qty: 1 EACH | Refills: 5 | Status: SHIPPED | OUTPATIENT
Start: 2021-10-13

## 2021-10-13 ASSESSMENT — ENCOUNTER SYMPTOMS
SHORTNESS OF BREATH: 1
BACK PAIN: 0
COUGH: 1
ABDOMINAL PAIN: 0

## 2021-10-13 NOTE — PROGRESS NOTES
SUBJECTIVE:    Charlie Cabrera is a 80 y.o. female who presents for a follow up visit. Chief Complaint   Patient presents with   [place] ED Follow-up     E/R f/u from [facility] on Monday for shortness of breath. Last Friday had hiatal hernia surgery. HPI   Patient is seen today in follow-up from an ER visit to [facility] emergency room yesterday. Patient had hiatal hernia surgery 4 days prior. She had been doing fairly well initially but started complaining of shortness of breath. She also had a slight cough. She has a history of asthma and she stated that at time of discharge after her surgery 4 days prior she was told not to continue her inhalers. She did restart her inhalers yesterday and is doing better. In the emergency room she had a CT of her chest that was negative for pulmonary embolus there was slight atelectasis and minimal pleural effusion. Today she complains of some swelling in her right lower extremity that has been there for a couple of days. Patient's medications, allergies, past medical,surgical, social and family histories were reviewed and updated as appropriate.      Past Medical History:   Diagnosis Date    Arthritis     Asthma     Atrial fibrillation (HCC)     past history    COPD [facility]ronic obstructive pulmonary disease) [facility] [address]     Diabetes mellitus ([facility] [address]     no meds    Hyperlipidemia     Hypertension     Influenza 02/08/2018    Thyroid disease      Past Surgical History:   Procedure Laterality Date    COLONOSCOPY      ENDOSCOPY, COLON, DIAGNOSTIC      HIATAL HERNIA REPAIR N/A 10/8/2021    LAPAROSCOPIC HIATAL HERNIA REPAIR, NISSEN FUNDOPLICATION performed by Neel Beauchamp MD at [facility] N/A 9/2/2021    EGD DIAGNOSTIC ONLY performed by Elbert Can MD at [facility]     Family History   Problem Relation Age of Onset    Heart Disease Mother     High Blood Pressure Mother     Stroke Mother     Heart Disease Sister     High Blood Pressure Sister     Cancer Brother         lung     Social History     Tobacco Use    Smoking status: Former Smoker     Packs/day: 1.00     Years: 10.00     Pack years: 10.00     Types: Cigarettes     Quit date: 10/5/1998     Years since quittin.0    Smokeless tobacco: Never Used    Tobacco comment: was only smoking socially on and off   Substance Use Topics    Alcohol use: No     Alcohol/week: 0.0 standard drinks      Allergies   Allergen Reactions    Aspirin      Recurrent GI blood Loss?  Sulfa Antibiotics      Other reaction(s): Other (See Comments)  hallucinations  hallucinations    Ace Inhibitors Palpitations     Swelling and cough  cough     Current Outpatient Medications on File Prior to Visit   Medication Sig Dispense Refill    omeprazole (PRILOSEC) 20 MG delayed release capsule TAKE 1 CAPSULE BY MOUTH EVERY MORNING BEFORE BREAKFAST 90 capsule 2    levothyroxine (SYNTHROID) 100 MCG tablet TAKE 1 TABLET BY MOUTH DAILY 90 tablet 1    valsartan-hydroCHLOROthiazide (DIOVAN-HCT) 80-12.5 MG per tablet TAKE 1 TABLET BY MOUTH DAILY 90 tablet 3    Cyanocobalamin (B-12 PO) Take by mouth daily      atorvastatin (LIPITOR) 20 MG tablet TAKE 1 TABLET BY MOUTH DAILY (Patient taking differently: Take 10 mg by mouth daily ) 90 tablet 3    acetaminophen (TYLENOL) 500 MG tablet Take 1,000 mg by mouth daily as needed       fluticasone (FLONASE) 50 MCG/ACT nasal spray 2 sprays by Nasal route daily as needed (as needed) (Patient not taking: Reported on 10/13/2021) 1 Bottle 2    cetirizine (ZYRTEC) 10 MG tablet Take 10 mg by mouth daily (Patient not taking: Reported on 10/13/2021)       No current facility-administered medications on file prior to visit. Review of Systems   HENT: Positive for congestion. Respiratory: Positive for cough and shortness of breath. Cardiovascular: Positive for leg swelling. Negative for chest pain.    Gastrointestinal: Negative for abdominal pain. Genitourinary: Negative for difficulty urinating. Musculoskeletal: Negative for arthralgias and back pain. Neurological: Negative for dizziness and light-headedness. OBJECTIVE:    /80   Pulse 98   Temp 97 °F (36.1 °C)   Wt 179 lb (81.2 kg)   SpO2 96%   BMI 33.82 kg/m²    Physical Exam  Constitutional:       General: She is not in acute distress. Appearance: She is well-developed. She is obese. HENT:      Head: Normocephalic and atraumatic. Right Ear: Tympanic membrane and external ear normal.      Left Ear: Tympanic membrane and external ear normal.      Nose: Nose normal.      Mouth/Throat:      Mouth: Mucous membranes are moist.      Pharynx: No posterior oropharyngeal erythema. Eyes:      General:         Right eye: No discharge. Conjunctiva/sclera: Conjunctivae normal.   Neck:      Thyroid: No thyromegaly. Vascular: No JVD. Trachea: No tracheal deviation. Cardiovascular:      Rate and Rhythm: Normal rate and regular rhythm. Heart sounds: Normal heart sounds. Pulmonary:      Effort: Pulmonary effort is normal. No respiratory distress. Breath sounds: Normal breath sounds. No rales. Musculoskeletal:      Cervical back: Normal range of motion and neck supple. Right lower leg: Edema ( Trace) present. Left lower leg: No edema. Lymphadenopathy:      Cervical: No cervical adenopathy. Skin:     General: Skin is warm and dry. Neurological:      Mental Status: She is alert and oriented to person, place, and time. Psychiatric:         Mood and Affect: Mood normal.         Behavior: Behavior normal.         ASSESSMENT/PLAN:    Austin Cerrato was seen today for ed follow-up. Diagnoses and all orders for this visit:    Moderate persistent asthma without complication  -     Albuterol Sulfate, sensor, (PROAIR DIGIHALER) 108 (90 Base) MCG/ACT AEPB;  Inhale 2 puffs into the lungs every 6 hours as needed (SOB)    Localized edema  - furosemide (LASIX) 20 MG tablet; Take 1 tablet by mouth daily  -     potassium chloride (KLOR-CON M) 10 MEQ extended release tablet; Take 1 tablet by mouth daily  -     VL Extremity Venous Right; Future  -     Basic Metabolic Panel; Future  -     VL Extremity Venous Right    Patient's BNP in the emergency room was only 110. She does have an incentive spirometer and have encouraged her to continue to use it and her nebulizer treatments as needed. Again she is encouraged to continue the Advair and Spiriva that had previously been stopped. I have also given her Lasix and potassium to use 3 days in a row. She will have a basic metabolic profile in just few days and follow-up here in 3 weeks. She will be called with results of her right lower extremity Doppler ultrasound. Return in about 3 weeks (around 11/3/2021). Please note portions of this note were completed with a voicerecognition program.  Efforts were made to edit the dictations but occasionally words are mis-transcribed.

## 2021-10-14 ENCOUNTER — HOSPITAL ENCOUNTER (OUTPATIENT)
Dept: VASCULAR LAB | Age: 81
Discharge: HOME | End: 2021-10-14
Payer: MEDICARE

## 2021-10-14 DIAGNOSIS — R60.0 LOCALIZED EDEMA: Primary | ICD-10-CM

## 2021-10-14 PROCEDURE — 93971 EXTREMITY STUDY: CPT

## 2021-10-20 ENCOUNTER — CARE COORDINATION (OUTPATIENT)
Dept: CASE MANAGEMENT | Age: 81
End: 2021-10-20

## 2021-10-21 ENCOUNTER — OFFICE VISIT (OUTPATIENT)
Dept: SURGERY | Age: 81
End: 2021-10-21

## 2021-10-21 VITALS — SYSTOLIC BLOOD PRESSURE: 107 MMHG | DIASTOLIC BLOOD PRESSURE: 62 MMHG | BODY MASS INDEX: 33.82 KG/M2 | WEIGHT: 179 LBS

## 2021-10-21 DIAGNOSIS — K44.9 PARAESOPHAGEAL HIATAL HERNIA: Primary | ICD-10-CM

## 2021-10-21 PROCEDURE — 99024 POSTOP FOLLOW-UP VISIT: CPT | Performed by: SURGERY

## 2021-10-29 ENCOUNTER — CARE COORDINATION (OUTPATIENT)
Dept: CASE MANAGEMENT | Age: 81
End: 2021-10-29

## 2021-10-29 NOTE — CARE COORDINATION
[facility] Transitions Follow Up Call    10/29/2021    Patient: Ginette Agosto  Patient : 1940   MRN: [id number]  Reason for Admission: Laparoscopic repair of paraesophageal hiatal hernia  Discharge Date: 10/12/21 RARS: Readmission Risk Score: 6         Spoke with: [id number] [place] Transitions Subsequent and Final Call    Subsequent and Final Calls  Do you have any ongoing symptoms?: No  Have your medications changed?: No  Do you have any questions related to your medications?: No  Do you currently have any active services?: No  Do you have any needs or concerns that I can assist you with?: No  Identified Barriers: None  Care Transitions Interventions  Other Interventions: Follow Up: Follow Up: Patient reports that she is doing well, denies any questions or concerns at this time. CTN provided Dr. Roxanna Hernández contact information, Dr. Alhaji Greene would like her to follow up with him.   CTN will continue with outreach follow up calls  Future Appointments   Date Time Provider Dino Hilton   2021 12:45 PM Pamella Ngo  Marbin Urbina RN

## 2021-10-29 NOTE — CARE COORDINATION
[facility] Transitions Initial Follow Up Call    Call within 2 business days of discharge: Yes    Patient: Elsie Barajas Patient : 1940   MRN: [id number]  Reason for Admission: ***  Discharge Date: 10/12/21 RARS: Readmission Risk Score: 6      Last Discharge [facility]       Complaint Diagnosis Description Type Department Provider    10/12/21 Shortness of Breath  ED (LWBS AFTER) F ED            Spoke with: ***    Facility: [unfilled]    Non-face-to-face services provided:  {TCMPD:14219:p}    Care Transitions 24 Hour Call    Do you have any ongoing symptoms?: No  Do you have all of your prescriptions and are they filled?: Yes  Were you discharged with any Home Care or Post Acute Services: No  Care Transitions Interventions         Follow Up: Patient reports that she is doing well, denies any questions or concerns at this time. CTN provided Dr. Francisco Rose contact information, Dr. Krys Davila would like her to follow up with him. CTN will continue with outreach follow up calls.     Future Appointments   Date Time Provider Dino Hilton   2021 12:45 PM Nikhil Good MD [address] [place] [address] Alejandra Olivera, MICHAEL

## 2021-11-04 ENCOUNTER — OFFICE VISIT (OUTPATIENT)
Dept: FAMILY MEDICINE CLINIC | Age: 81
End: 2021-11-04
Payer: MEDICARE

## 2021-11-04 VITALS
TEMPERATURE: 96.1 F | SYSTOLIC BLOOD PRESSURE: 110 MMHG | BODY MASS INDEX: 32.31 KG/M2 | DIASTOLIC BLOOD PRESSURE: 70 MMHG | WEIGHT: 171 LBS

## 2021-11-04 DIAGNOSIS — Z23 NEED FOR INFLUENZA VACCINATION: Primary | ICD-10-CM

## 2021-11-04 DIAGNOSIS — E78.00 PURE HYPERCHOLESTEROLEMIA: ICD-10-CM

## 2021-11-04 DIAGNOSIS — K21.9 GASTROESOPHAGEAL REFLUX DISEASE WITHOUT ESOPHAGITIS: ICD-10-CM

## 2021-11-04 DIAGNOSIS — R73.9 HYPERGLYCEMIA: ICD-10-CM

## 2021-11-04 DIAGNOSIS — E03.9 ACQUIRED HYPOTHYROIDISM: ICD-10-CM

## 2021-11-04 DIAGNOSIS — I10 ESSENTIAL HYPERTENSION: ICD-10-CM

## 2021-11-04 PROCEDURE — 1123F ACP DISCUSS/DSCN MKR DOCD: CPT | Performed by: FAMILY MEDICINE

## 2021-11-04 PROCEDURE — G8484 FLU IMMUNIZE NO ADMIN: HCPCS | Performed by: FAMILY MEDICINE

## 2021-11-04 PROCEDURE — 99214 OFFICE O/P EST MOD 30 MIN: CPT | Performed by: FAMILY MEDICINE

## 2021-11-04 PROCEDURE — 1111F DSCHRG MED/CURRENT MED MERGE: CPT | Performed by: FAMILY MEDICINE

## 2021-11-04 PROCEDURE — G8399 PT W/DXA RESULTS DOCUMENT: HCPCS | Performed by: FAMILY MEDICINE

## 2021-11-04 PROCEDURE — G0008 ADMIN INFLUENZA VIRUS VAC: HCPCS | Performed by: FAMILY MEDICINE

## 2021-11-04 PROCEDURE — 4040F PNEUMOC VAC/ADMIN/RCVD: CPT | Performed by: FAMILY MEDICINE

## 2021-11-04 PROCEDURE — G8417 CALC BMI ABV UP PARAM F/U: HCPCS | Performed by: FAMILY MEDICINE

## 2021-11-04 PROCEDURE — G8427 DOCREV CUR MEDS BY ELIG CLIN: HCPCS | Performed by: FAMILY MEDICINE

## 2021-11-04 PROCEDURE — 1090F PRES/ABSN URINE INCON ASSESS: CPT | Performed by: FAMILY MEDICINE

## 2021-11-04 PROCEDURE — 1036F TOBACCO NON-USER: CPT | Performed by: FAMILY MEDICINE

## 2021-11-04 PROCEDURE — 90694 VACC AIIV4 NO PRSRV 0.5ML IM: CPT | Performed by: FAMILY MEDICINE

## 2021-11-04 ASSESSMENT — COPD QUESTIONNAIRES: COPD: 1

## 2021-11-04 ASSESSMENT — ENCOUNTER SYMPTOMS
DIARRHEA: 0
RHINORRHEA: 0
BACK PAIN: 0
SHORTNESS OF BREATH: 1
CONSTIPATION: 1
COUGH: 1

## 2021-11-04 NOTE — PROGRESS NOTES
Vaccine Information Sheet, \"Influenza - Inactivated\"  given to [facility]note 2[facility], or parent/legal guardian of  [facility] and verbalized understanding. Patient responses:    Have you ever had a reaction to a flu vaccine? No  Do you have any current illness? No  Have you ever had Guillian Climax Syndrome? No  Do you have a serious allergy to any of the follow: Neomycin, Polymyxin, Thimerosal, eggs or egg products? No    Flu vaccine given per order. Please see immunization tab. Risks and benefits explained. Current VIS given.       Immunizations Administered     Name Date Dose Route    Influenza, Quadv, adjuvanted, 65 yrs +, IM, PF (Fluad) 11/4/2021 0.5 mL Intramuscular    Site: Deltoid- Right    Lot: [id number]    NDC: 58604-063-99

## 2021-11-04 NOTE — PATIENT INSTRUCTIONS
Patient Education        Influenza (Flu) Vaccine (Inactivated or Recombinant): What You Need to Know  Why get vaccinated? Influenza vaccine can prevent influenza (flu). Flu is a contagious disease that spreads around the [place] every year, usually between October and May. Anyone can get the flu, but it is more dangerous for some people. Infants and young children, people 72years of age and older, pregnant women, and people with certain health conditions or a weakened immune system are at greatest risk of flu complications. Pneumonia, bronchitis, sinus infections and ear infections are examples of flu-related complications. If you have a medical condition, such as heart disease, cancer or diabetes, flu can make it worse. Flu can cause fever and chills, sore throat, muscle aches, fatigue, cough, headache, and runny or stuffy nose. Some people may have vomiting and diarrhea, though this is more common in children than adults. Each year, thousands of people in the [facility] die from flu, and many more are hospitalized. Flu vaccine prevents millions of illnesses and flu-related visits to the doctor each year. Influenza vaccine  CDC recommends everyone 10months of age and older get vaccinated every flu season. Children 6 months through 6years of age may need 2 doses during a single flu season. Everyone else needs only 1 dose each flu season. It takes about 2 weeks for protection to develop after vaccination. There are many flu viruses, and they are always changing. Each year a new flu vaccine is made to protect against three or four viruses that are likely to cause disease in the upcoming flu season. Even when the vaccine doesn't exactly match these viruses, it may still provide some protection. Influenza vaccine does not cause flu. Influenza vaccine may be given at the same time as other vaccines.   Talk with your health care provider  Tell your vaccine provider if the person getting the vaccine:  · Has had an allergic reaction after a previous dose of influenza vaccine, or has any severe, life-threatening allergies. · Has ever had Guillain-Barré Syndrome (also called GBS). In some cases, your health care provider may decide to postpone influenza vaccination to a future visit. People with minor illnesses, such as a cold, may be vaccinated. People who are moderately or severely ill should usually wait until they recover before getting influenza vaccine. Your health care provider can give you more information. Risks of a vaccine reaction  · Soreness, redness, and swelling where shot is given, fever, muscle aches, and headache can happen after influenza vaccine. · There may be a very small increased risk of Guillain-Barré Syndrome (GBS) after inactivated influenza vaccine (the flu shot). Simon Milner children who get the flu shot along with pneumococcal vaccine (PCV13), and/or DTaP vaccine at the same time might be slightly more likely to have a seizure caused by fever. Tell your health care provider if a child who is getting flu vaccine has ever had a seizure. People sometimes faint after medical procedures, including vaccination. Tell your provider if you feel dizzy or have vision changes or ringing in the ears. As with any medicine, there is a very remote chance of a vaccine causing a severe allergic reaction, other serious injury, or death. What if there is a serious problem? An allergic reaction could occur after the vaccinated person leaves the clinic. If you see signs of a severe allergic reaction (hives, swelling of the face and throat, difficulty breathing, a fast heartbeat, dizziness, or weakness), call 9-1-1 and get the person to the nearest hospital.  For other signs that concern you, call your health care provider. Adverse reactions should be reported to the Vaccine Adverse Event Reporting System (VAERS).  Your health care provider will usually file this report, or you can do it yourself. Visit the VAERS website at www.vaers. hhs.gov or call 9-905.122.3062. VAERS is only for reporting reactions, and VAERS staff do not give medical advice. The National Vaccine Injury Compensation Program  The National Vaccine Injury Compensation Program (VICP) is a federal program that was created to compensate people who may have been injured by certain vaccines. Visit the VICP website at www.hrsa.gov/vaccinecompensation or call 9-408.770.9947 to learn about the program and about filing a claim. There is a time limit to file a claim for compensation. How can I learn more? · Ask your healthcare provider. · Call your local or state health department. · Contact the Centers for Disease Control and Prevention (CDC):  ? Call 8-335.670.7014 (0-744-EGG-INFO) or  ? Visit CDC's website at www.cdc.gov/flu  Vaccine Information Statement (Interim)  Inactivated Influenza Vaccine  8/15/2019  [id number]  Department of Health and Human Services  Centers for Disease Control and Prevention  Many Vaccine Information Statements are available in [place] and other languages. See www.immunize.org/vis. Muchas hojas de información sobre vacunas están disponibles en español y en otros idiomas. Visite www.immunize.org/vis. Care instructions adapted under license by [facility] ([facility]). If you have questions about a medical condition or this instruction, always ask your healthcare professional. [address] [address] any warranty or liability for your use of this information. Patient Education        Constipation: Care Instructions  Your Care Instructions     Constipation means that you have a hard time passing stools (bowel movements). People pass stools from 3 times a day to once every 3 days. What is normal for you may be different. Constipation may occur with pain in the rectum and cramping. The pain may get worse when you try to pass stools.  Sometimes there are small amounts of bright red blood on toilet paper or the surface of stools. This is because of enlarged veins near the rectum (hemorrhoids). A few changes in your diet and lifestyle may help you avoid ongoing constipation. Your doctor may also prescribe medicine to help loosen your stool. Some medicines can cause constipation. These include pain medicines and antidepressants. Tell your doctor about all the medicines you take. Your doctor may want to make a medicine change to ease your symptoms. Follow-up care is a key part of your treatment and safety. Be sure to make and go to all appointments, and call your doctor if you are having problems. It's also a good idea to know your test results and keep a list of the medicines you take. How can you care for yourself at home? · Drink plenty of fluids. If you have kidney, heart, or liver disease and have to limit fluids, talk with your doctor before you increase the amount of fluids you drink. · Include high-fiber foods in your diet each day. These include fruits, vegetables, beans, and whole grains. · Get at least 30 minutes of exercise on most days of the week. Walking is a good choice. You also may want to do other activities, such as running, swimming, cycling, or playing tennis or team sports. · Take a fiber supplement, such as Citrucel or Metamucil, every day. Read and follow all instructions on the label. · Schedule time each day for a bowel movement. A daily routine may help. Take your time having your bowel movement. · Support your feet with a small step stool when you sit on the toilet. This helps flex your hips and places your pelvis in a squatting position. · Your doctor may recommend an over-the-counter laxative to relieve your constipation. Examples are Milk of Magnesia and MiraLax. Read and follow all instructions on the label. Do not use laxatives on a long-term basis. When should you call for help?    Call your doctor now or seek immediate medical care if:    · You have new or worse belly pain.     · You have new or worse nausea or vomiting.     · You have blood in your stools. Watch closely for changes in your health, and be sure to contact your doctor if:    · Your constipation is getting worse.     · You do not get better as expected. Where can you learn more? Go to https://chpepiceweb.[employer]. org and sign in to your [employer] account. Enter 21 [phone] in the [employer] box to learn more about \"Constipation: Care Instructions. \"     If you do not have an account, please click on the \"Sign Up Now\" link. Current as of: July 1, 2021               Content Version: 13.0  © 2006-2021 Healthwise[employer] Inc[employer]. Care instructions adapted under license by [facility] ([facility]). If you have questions about a medical condition or this instruction, always ask your healthcare professional. [address] any warranty or liability for your use of this information.

## 2021-11-04 NOTE — PROGRESS NOTES
SUBJECTIVE:    Janeth Parish is a 80 y.o. female who presents for a follow up visit. Chief Complaint   Patient presents with    Follow-up     Patient is here for a follow up. Discuss lab. COPD  She complains of cough and shortness of breath. This is a chronic problem. The current episode started more than 1 year ago. The cough is productive of sputum. Associated symptoms include appetite change and dyspnea on exertion. Pertinent negatives include no postnasal drip or rhinorrhea. Her symptoms are aggravated by exercise, change in weather and URI. Her symptoms are alleviated by beta-agonist. She reports moderate improvement on treatment. Her past medical history is significant for COPD. Hypertension  This is a chronic problem. The current episode started more than 1 year ago. The problem is controlled. Associated symptoms include shortness of breath. Risk factors for coronary artery disease include obesity, sedentary lifestyle and post-menopausal state. Past treatments include angiotensin blockers and diuretics. The current treatment provides significant improvement. Compliance problems include exercise. Patient's medications, allergies, past medical,surgical, social and family histories were reviewed and updated as appropriate.      Past Medical History:   Diagnosis Date    Arthritis     Asthma     Atrial fibrillation (HCC)     past history    [facility]     Diabetes mellitus ([facility]     no meds    Hyperlipidemia     Hypertension     Influenza 02/08/2018    Thyroid disease      Past Surgical History:   Procedure Laterality Date    COLONOSCOPY      ENDOSCOPY, COLON, DIAGNOSTIC      HIATAL HERNIA REPAIR N/A 10/8/2021    LAPAROSCOPIC HIATAL HERNIA REPAIR, NISSEN FUNDOPLICATION performed by Arsen Pimentel MD at [address] N/A 9/2/2021    EGD DIAGNOSTIC ONLY performed by James Erickson MD at [address] [facility] ENDOSCOPY     Family History   Problem Relation Age of Onset    Heart Disease Mother     High Blood Pressure Mother     Stroke Mother     Heart Disease Sister     High Blood Pressure Sister     Cancer Brother         lung     Social History     Tobacco Use    Smoking status: Former Smoker     Packs/day: 1.00     Years: 10.00     Pack years: 10.00     Types: Cigarettes     Quit date: 10/5/1998     Years since quittin.0    Smokeless tobacco: Never Used    Tobacco comment: was only smoking socially on and off   Substance Use Topics    Alcohol use: No     Alcohol/week: 0.0 standard drinks      Allergies   Allergen Reactions    Aspirin      Recurrent GI blood Loss?  Sulfa Antibiotics      Other reaction(s): Other (See Comments)  hallucinations  hallucinations    Ace Inhibitors Palpitations     Swelling and cough  cough     Current Outpatient Medications on File Prior to Visit   Medication Sig Dispense Refill    Albuterol Sulfate, sensor, (PROAIR DIGIHALER) 108 (90 Base) MCG/ACT AEPB Inhale 2 puffs into the lungs every 6 hours as needed (SOB) 1 each 5    omeprazole (PRILOSEC) 20 MG delayed release capsule TAKE 1 CAPSULE BY MOUTH EVERY MORNING BEFORE BREAKFAST 90 capsule 2    levothyroxine (SYNTHROID) 100 MCG tablet TAKE 1 TABLET BY MOUTH DAILY 90 tablet 1    valsartan-hydroCHLOROthiazide (DIOVAN-HCT) 80-12.5 MG per tablet TAKE 1 TABLET BY MOUTH DAILY 90 tablet 3    Cyanocobalamin (B-12 PO) Take by mouth daily      atorvastatin (LIPITOR) 20 MG tablet TAKE 1 TABLET BY MOUTH DAILY (Patient taking differently: Take 10 mg by mouth daily ) 90 tablet 3    fluticasone (FLONASE) 50 MCG/ACT nasal spray 2 sprays by Nasal route daily as needed (as needed) 1 Bottle 2    cetirizine (ZYRTEC) 10 MG tablet Take 10 mg by mouth daily       acetaminophen (TYLENOL) 500 MG tablet Take 1,000 mg by mouth daily as needed        No current facility-administered medications on file prior to visit.         Review of Systems   Constitutional: Positive for appetite change. HENT: Negative for congestion, postnasal drip and rhinorrhea. Respiratory: Positive for cough and shortness of breath. Cardiovascular: Positive for dyspnea on exertion. Gastrointestinal: Positive for constipation. Negative for diarrhea. Genitourinary: Negative for difficulty urinating. Musculoskeletal: Negative for arthralgias and back pain. Psychiatric/Behavioral: Negative for sleep disturbance. OBJECTIVE:    /70   Temp 96.1 °F (35.6 °C)   Wt 171 lb (77.6 kg)   BMI 32.31 kg/m²    Physical Exam  Constitutional:       Appearance: Normal appearance. She is well-developed. She is obese. HENT:      Head: Normocephalic and atraumatic. Right Ear: Tympanic membrane and external ear normal.      Left Ear: Tympanic membrane and external ear normal.      Nose: Nose normal.      Mouth/Throat:      Mouth: Mucous membranes are moist.      Pharynx: No posterior oropharyngeal erythema. Eyes:      General:         Right eye: No discharge. Conjunctiva/sclera: Conjunctivae normal.   Neck:      Thyroid: No thyromegaly. Vascular: No JVD. Trachea: No tracheal deviation. Cardiovascular:      Rate and Rhythm: Normal rate and regular rhythm. Heart sounds: Normal heart sounds. Pulmonary:      Effort: Pulmonary effort is normal. No respiratory distress. Breath sounds: Normal breath sounds. No rales. Musculoskeletal:      Cervical back: Normal range of motion and neck supple. Right lower leg: No edema. Left lower leg: No edema. Lymphadenopathy:      Cervical: No cervical adenopathy. Skin:     General: Skin is warm and dry. Neurological:      Mental Status: She is alert and oriented to person, place, and time. Psychiatric:         Mood and Affect: Mood normal.         Behavior: Behavior normal.         ASSESSMENT/PLAN:    Walter Perez was seen today for follow-up.     Diagnoses and all orders for this visit:    Need for influenza vaccination  -     INFLUENZA, QUADV, ADJUVANTED, 72 YRS =, IM, PF, PREFILL SYR, 0.5ML (FLUAD)    Essential hypertension  Good control on meds    Gastroesophageal reflux disease without esophagitis  Stable    Pure hypercholesterolemia  -     Comprehensive Metabolic Panel, Fasting; Future  -     Lipid, Fasting; Future  -     CBC Auto Differential; Future    Acquired hypothyroidism  -     TSH with Reflex; Future    Hyperglycemia  -     Hemoglobin A1C; Future        Return in about 3 months (around 2/4/2022). Please note portions of this note were completed with a voicerecognition program.  Efforts were made to edit the dictations but occasionally words are mis-transcribed.

## 2021-11-05 ENCOUNTER — CARE COORDINATION (OUTPATIENT)
Dept: CASE MANAGEMENT | Age: 81
End: 2021-11-05

## 2022-01-11 ENCOUNTER — OFFICE VISIT (OUTPATIENT)
Dept: FAMILY MEDICINE CLINIC | Age: 82
End: 2022-01-11
Payer: MEDICARE

## 2022-01-11 VITALS — OXYGEN SATURATION: 97 % | TEMPERATURE: 98.6 F | HEART RATE: 67 BPM

## 2022-01-11 DIAGNOSIS — R05.9 COUGH: Primary | ICD-10-CM

## 2022-01-11 PROCEDURE — G8399 PT W/DXA RESULTS DOCUMENT: HCPCS | Performed by: PHYSICIAN ASSISTANT

## 2022-01-11 PROCEDURE — 1090F PRES/ABSN URINE INCON ASSESS: CPT | Performed by: PHYSICIAN ASSISTANT

## 2022-01-11 PROCEDURE — G8484 FLU IMMUNIZE NO ADMIN: HCPCS | Performed by: PHYSICIAN ASSISTANT

## 2022-01-11 PROCEDURE — 99213 OFFICE O/P EST LOW 20 MIN: CPT | Performed by: PHYSICIAN ASSISTANT

## 2022-01-11 PROCEDURE — G8417 CALC BMI ABV UP PARAM F/U: HCPCS | Performed by: PHYSICIAN ASSISTANT

## 2022-01-11 PROCEDURE — 1123F ACP DISCUSS/DSCN MKR DOCD: CPT | Performed by: PHYSICIAN ASSISTANT

## 2022-01-11 PROCEDURE — 1036F TOBACCO NON-USER: CPT | Performed by: PHYSICIAN ASSISTANT

## 2022-01-11 PROCEDURE — 4040F PNEUMOC VAC/ADMIN/RCVD: CPT | Performed by: PHYSICIAN ASSISTANT

## 2022-01-11 PROCEDURE — G8427 DOCREV CUR MEDS BY ELIG CLIN: HCPCS | Performed by: PHYSICIAN ASSISTANT

## 2022-01-11 ASSESSMENT — ENCOUNTER SYMPTOMS
VOMITING: 0
NAUSEA: 0
EYE PAIN: 0
WHEEZING: 0
SHORTNESS OF BREATH: 1
COUGH: 1
SORE THROAT: 0
SINUS PAIN: 1
DIARRHEA: 0

## 2022-01-11 NOTE — PATIENT INSTRUCTIONS
Ana Faustin was seen today for cough. Diagnoses and all orders for this visit:    Cough  -     COVID-19       Treat symptomatically and quarantine until you hear from us.

## 2022-01-11 NOTE — PROGRESS NOTES
Subjective:      Patient ID: Danetta Snellen is a 80 y.o. female. HPI Patient is here today due to a 7 day history of head congestion, productive cough with clear phlegm. Denies fever, body aches, chills, n/v/d, ST, loss of taste or smell. She is always SOB a little bit but nothing out of the ordinary. She has not taken anything otc. She is a nonsmoker. She has had 3 Covid vaccines. She did get a flu shot as well. Review of Systems   Constitutional: Negative for appetite change, chills, fatigue and fever. HENT: Positive for congestion and sinus pain. Negative for ear pain and sore throat. Eyes: Negative for pain. Respiratory: Positive for cough and shortness of breath (but not more than usual). Negative for wheezing. Gastrointestinal: Negative for diarrhea, nausea and vomiting. Musculoskeletal: Negative for myalgias. Skin: Negative for rash. Neurological: Negative for dizziness and headaches. Objective:   Physical Exam  Vitals reviewed. Constitutional:       General: She is not in acute distress. Appearance: Normal appearance. She is well-developed and well-groomed. HENT:      Head: Normocephalic. Right Ear: Tympanic membrane and ear canal normal.      Left Ear: Tympanic membrane and ear canal normal.      Nose: Mucosal edema and congestion present. No rhinorrhea. Right Sinus: No maxillary sinus tenderness or frontal sinus tenderness. Left Sinus: No maxillary sinus tenderness or frontal sinus tenderness. Mouth/Throat:      Pharynx: Oropharynx is clear. Uvula midline. Cardiovascular:      Rate and Rhythm: Normal rate and regular rhythm. Heart sounds: Normal heart sounds. Pulmonary:      Effort: Pulmonary effort is normal. No respiratory distress. Breath sounds: Normal breath sounds. Musculoskeletal:      Cervical back: Neck supple. Lymphadenopathy:      Cervical: No cervical adenopathy. Skin:     General: Skin is warm and dry. Findings: No rash. Neurological:      Mental Status: She is alert and oriented to person, place, and time. Psychiatric:         Attention and Perception: Attention normal.         Mood and Affect: Mood normal.         Speech: Speech normal.         Behavior: Behavior normal. Behavior is cooperative. Assessment:      Hollie Cid was seen today for cough. Diagnoses and all orders for this visit:    Cough  -     COVID-19    Other orders  -     COVID-19  -     COVID-19  -     COVID-19             Plan:      Treat symptomatically, quarantine until you hear from us.          Dema Seip, [place]

## 2022-01-12 LAB — SARS-COV-2: DETECTED

## 2022-01-31 ASSESSMENT — ENCOUNTER SYMPTOMS
HEARTBURN: 1
SHORTNESS OF BREATH: 1

## 2022-01-31 NOTE — PROGRESS NOTES
SUBJECTIVE:    Jama Chung is a 80 y.o. female who presents for a follow up visit. Chief Complaint   Patient presents with    Follow-up     Patient is here for a follow up. Discuss lab. No new concerns. Hyperlipidemia  This is a chronic problem. The current episode started more than 1 year ago. Lipid results: Total cholesterol 187, triglycerides 100, HDL 54, . Factors aggravating her hyperlipidemia include thiazides. Associated symptoms include shortness of breath. Pertinent negatives include no chest pain. Current antihyperlipidemic treatment includes statins. The current treatment provides significant improvement of lipids. Compliance problems include adherence to exercise and adherence to diet. Risk factors for coronary artery disease include dyslipidemia, hypertension, obesity, post-menopausal and a sedentary lifestyle. Hypertension  This is a chronic problem. The current episode started more than 1 year ago. The problem is controlled. Associated symptoms include shortness of breath. Pertinent negatives include no chest pain or palpitations. Risk factors for coronary artery disease include dyslipidemia, obesity, sedentary lifestyle and post-menopausal state. Past treatments include angiotensin blockers and diuretics. The current treatment provides significant improvement. Compliance problems include exercise and diet. Gastroesophageal Reflux  She complains of heartburn. She reports no chest pain. This is a chronic problem. The current episode started more than 1 year ago. The problem has been resolved. The heartburn is located in the substernum. The heartburn is of mild intensity. The heartburn does not wake her from sleep. The heartburn does not limit her activity. The heartburn doesn't change with position. The symptoms are aggravated by certain foods. Risk factors include obesity. She has tried a PPI for the symptoms. The treatment provided significant relief.  Past procedures include an EGD. Past invasive treatments include reflux surgery. Patient's medications, allergies, past medical,surgical, social and family histories were reviewed and updated as appropriate. Past Medical History:   Diagnosis Date    Arthritis     Asthma     Atrial fibrillation (HCC)     past history    COPD (chronic obstructive pulmonary disease) ([facility]     Diabetes mellitus ([facility]     no meds    Hyperlipidemia     Hypertension     Influenza 2018    Thyroid disease      Past Surgical History:   Procedure Laterality Date    COLONOSCOPY      ENDOSCOPY, COLON, DIAGNOSTIC      HIATAL HERNIA REPAIR N/A 10/8/2021    LAPAROSCOPIC HIATAL HERNIA REPAIR, NISSEN FUNDOPLICATION performed by Roger Mendoza MD at [place] [address] N/A 2021    EGD DIAGNOSTIC ONLY performed by Chintan Butler MD at [address] [place] [address]     Family History   Problem Relation Age of Onset    Heart Disease Mother     High Blood Pressure Mother     Stroke Mother     Heart Disease Sister     High Blood Pressure Sister     Cancer Brother         lung     Social History     Tobacco Use    Smoking status: Former Smoker     Packs/day: 1.00     Years: 10.00     Pack years: 10.00     Types: Cigarettes     Quit date: 10/5/1998     Years since quittin.3    Smokeless tobacco: Never Used    Tobacco comment: was only smoking socially on and off   Substance Use Topics    Alcohol use: No     Alcohol/week: 0.0 standard drinks      Allergies   Allergen Reactions    Aspirin      Recurrent GI blood Loss?  Sulfa Antibiotics      Other reaction(s):  Other (See Comments)  hallucinations  hallucinations    Ace Inhibitors Palpitations     Swelling and cough  cough     Current Outpatient Medications on File Prior to Visit   Medication Sig Dispense Refill    Albuterol Sulfate, sensor, (PROAIR DIGIHALER) 108 (90 Base) MCG/ACT AEPB Inhale 2 puffs into the lungs every 6 hours as needed (SOB) 1 each 5    omeprazole (PRILOSEC) 20 MG delayed release capsule TAKE 1 CAPSULE BY MOUTH EVERY MORNING BEFORE BREAKFAST 90 capsule 2    levothyroxine (SYNTHROID) 100 MCG tablet TAKE 1 TABLET BY MOUTH DAILY 90 tablet 1    valsartan-hydroCHLOROthiazide (DIOVAN-HCT) 80-12.5 MG per tablet TAKE 1 TABLET BY MOUTH DAILY 90 tablet 3    Cyanocobalamin (B-12 PO) Take by mouth daily      atorvastatin (LIPITOR) 20 MG tablet TAKE 1 TABLET BY MOUTH DAILY (Patient taking differently: Take 10 mg by mouth daily ) 90 tablet 3    cetirizine (ZYRTEC) 10 MG tablet Take 10 mg by mouth daily       acetaminophen (TYLENOL) 500 MG tablet Take 1,000 mg by mouth daily as needed        No current facility-administered medications on file prior to visit. Review of Systems   Constitutional: Negative for activity change and unexpected weight change. HENT: Positive for postnasal drip and rhinorrhea. Negative for congestion. Respiratory: Positive for shortness of breath. Negative for chest tightness. Cardiovascular: Negative for chest pain, palpitations and leg swelling. Gastrointestinal: Positive for heartburn. Negative for constipation and diarrhea. Genitourinary: Negative for difficulty urinating. Neurological: Negative for dizziness and light-headedness. Psychiatric/Behavioral: Negative for dysphoric mood and sleep disturbance. The patient is not nervous/anxious. OBJECTIVE:    /80   Temp 97.2 °F (36.2 °C)   Wt 171 lb (77.6 kg)   BMI 32.31 kg/m²    Physical Exam  Constitutional:       Appearance: Normal appearance. She is well-developed. She is obese. HENT:      Head: Normocephalic and atraumatic. Right Ear: External ear normal.      Left Ear: External ear normal.      Nose: Nose normal.      Mouth/Throat:      Mouth: Mucous membranes are moist.      Pharynx: No posterior oropharyngeal erythema. Eyes:      General:         Right eye: No discharge.       Conjunctiva/sclera: Conjunctivae normal.   Neck: Thyroid: No thyromegaly. Vascular: No JVD. Trachea: No tracheal deviation. Cardiovascular:      Rate and Rhythm: Normal rate and regular rhythm. Heart sounds: Normal heart sounds. Pulmonary:      Effort: Pulmonary effort is normal. No respiratory distress. Breath sounds: Normal breath sounds. No rales. Musculoskeletal:      Cervical back: Normal range of motion and neck supple. Right lower leg: No edema. Left lower leg: No edema. Lymphadenopathy:      Cervical: No cervical adenopathy. Skin:     General: Skin is warm and dry. Neurological:      Mental Status: She is alert and oriented to person, place, and time. Psychiatric:         Mood and Affect: Mood normal.         Behavior: Behavior normal.         ASSESSMENT/PLAN:    Sky Ortega was seen today for follow-up. Diagnoses and all orders for this visit:    Acquired hypothyroidism  Doing well on current dose of Synthroid. Most recent TSH was 0.862. Essential hypertension  Excellent control on current medications    Pure hypercholesterolemia  -     Comprehensive Metabolic Panel, Fasting; Future  -     Lipid, Fasting; Future    Gastroesophageal reflux disease without esophagitis  Symptoms in good control with omeprazole 20 mg daily    Allergic rhinitis, unspecified seasonality, unspecified trigger  -     fluticasone (FLONASE) 50 MCG/ACT nasal spray; 2 sprays by Nasal route daily as needed for Allergies (as needed)        Return in about 6 months (around 8/1/2022). Please note portions of this note were completed with a voicerecognition program.  Efforts were made to edit the dictations but occasionally words are mis-transcribed.

## 2022-02-01 ENCOUNTER — OFFICE VISIT (OUTPATIENT)
Dept: FAMILY MEDICINE CLINIC | Age: 82
End: 2022-02-01
Payer: MEDICARE

## 2022-02-01 VITALS
TEMPERATURE: 97.2 F | BODY MASS INDEX: 32.31 KG/M2 | WEIGHT: 171 LBS | SYSTOLIC BLOOD PRESSURE: 118 MMHG | DIASTOLIC BLOOD PRESSURE: 80 MMHG

## 2022-02-01 DIAGNOSIS — E78.00 PURE HYPERCHOLESTEROLEMIA: ICD-10-CM

## 2022-02-01 DIAGNOSIS — J30.9 ALLERGIC RHINITIS, UNSPECIFIED SEASONALITY, UNSPECIFIED TRIGGER: ICD-10-CM

## 2022-02-01 DIAGNOSIS — E03.9 ACQUIRED HYPOTHYROIDISM: Primary | ICD-10-CM

## 2022-02-01 DIAGNOSIS — I10 ESSENTIAL HYPERTENSION: ICD-10-CM

## 2022-02-01 DIAGNOSIS — K21.9 GASTROESOPHAGEAL REFLUX DISEASE WITHOUT ESOPHAGITIS: ICD-10-CM

## 2022-02-01 PROCEDURE — 1090F PRES/ABSN URINE INCON ASSESS: CPT | Performed by: FAMILY MEDICINE

## 2022-02-01 PROCEDURE — 99214 OFFICE O/P EST MOD 30 MIN: CPT | Performed by: FAMILY MEDICINE

## 2022-02-01 PROCEDURE — G8399 PT W/DXA RESULTS DOCUMENT: HCPCS | Performed by: FAMILY MEDICINE

## 2022-02-01 PROCEDURE — G8484 FLU IMMUNIZE NO ADMIN: HCPCS | Performed by: FAMILY MEDICINE

## 2022-02-01 PROCEDURE — G8417 CALC BMI ABV UP PARAM F/U: HCPCS | Performed by: FAMILY MEDICINE

## 2022-02-01 PROCEDURE — 4040F PNEUMOC VAC/ADMIN/RCVD: CPT | Performed by: FAMILY MEDICINE

## 2022-02-01 PROCEDURE — 1036F TOBACCO NON-USER: CPT | Performed by: FAMILY MEDICINE

## 2022-02-01 PROCEDURE — 1123F ACP DISCUSS/DSCN MKR DOCD: CPT | Performed by: FAMILY MEDICINE

## 2022-02-01 PROCEDURE — G8427 DOCREV CUR MEDS BY ELIG CLIN: HCPCS | Performed by: FAMILY MEDICINE

## 2022-02-01 RX ORDER — FLUTICASONE PROPIONATE 50 MCG
2 SPRAY, SUSPENSION (ML) NASAL DAILY PRN
Qty: 1 EACH | Refills: 3 | Status: SHIPPED | OUTPATIENT
Start: 2022-02-01

## 2022-02-01 ASSESSMENT — ENCOUNTER SYMPTOMS
RHINORRHEA: 1
DIARRHEA: 0
CONSTIPATION: 0
CHEST TIGHTNESS: 0

## 2022-02-07 ENCOUNTER — OFFICE VISIT (OUTPATIENT)
Dept: CARDIOLOGY CLINIC | Age: 82
End: 2022-02-07
Payer: MEDICARE

## 2022-02-07 VITALS
WEIGHT: 171.2 LBS | HEIGHT: 61 IN | SYSTOLIC BLOOD PRESSURE: 114 MMHG | HEART RATE: 86 BPM | BODY MASS INDEX: 32.32 KG/M2 | DIASTOLIC BLOOD PRESSURE: 70 MMHG | OXYGEN SATURATION: 95 %

## 2022-02-07 DIAGNOSIS — E78.5 HYPERLIPIDEMIA, UNSPECIFIED HYPERLIPIDEMIA TYPE: ICD-10-CM

## 2022-02-07 DIAGNOSIS — I25.10 NON-OCCLUSIVE CORONARY ARTERY DISEASE: Primary | ICD-10-CM

## 2022-02-07 DIAGNOSIS — I10 ESSENTIAL HYPERTENSION: ICD-10-CM

## 2022-02-07 DIAGNOSIS — R06.09 DOE (DYSPNEA ON EXERTION): ICD-10-CM

## 2022-02-07 PROCEDURE — 1123F ACP DISCUSS/DSCN MKR DOCD: CPT | Performed by: INTERNAL MEDICINE

## 2022-02-07 PROCEDURE — G8484 FLU IMMUNIZE NO ADMIN: HCPCS | Performed by: INTERNAL MEDICINE

## 2022-02-07 PROCEDURE — 1036F TOBACCO NON-USER: CPT | Performed by: INTERNAL MEDICINE

## 2022-02-07 PROCEDURE — G8399 PT W/DXA RESULTS DOCUMENT: HCPCS | Performed by: INTERNAL MEDICINE

## 2022-02-07 PROCEDURE — G8417 CALC BMI ABV UP PARAM F/U: HCPCS | Performed by: INTERNAL MEDICINE

## 2022-02-07 PROCEDURE — 1090F PRES/ABSN URINE INCON ASSESS: CPT | Performed by: INTERNAL MEDICINE

## 2022-02-07 PROCEDURE — G8427 DOCREV CUR MEDS BY ELIG CLIN: HCPCS | Performed by: INTERNAL MEDICINE

## 2022-02-07 PROCEDURE — 4040F PNEUMOC VAC/ADMIN/RCVD: CPT | Performed by: INTERNAL MEDICINE

## 2022-02-07 PROCEDURE — 99214 OFFICE O/P EST MOD 30 MIN: CPT | Performed by: INTERNAL MEDICINE

## 2022-02-07 RX ORDER — ATORVASTATIN CALCIUM 40 MG/1
40 TABLET, FILM COATED ORAL DAILY
Qty: 90 TABLET | Refills: 0 | Status: SHIPPED | OUTPATIENT
Start: 2022-02-07

## 2022-02-07 NOTE — PROGRESS NOTES
[address]  Cardiac Consult     Referring Provider:  Danni Carlson MD     Chief Complaint   Patient presents with    Coronary Artery Disease    Hypertension        History of Present Illness:  80 y.o. female seen in f/u for dyspnea and chest pain. She is a former smoker and has HTN and prediabetes. She has neuropathy so is very inactive. She has been having chest tightness for a few months. No clear exacerbating or relieving factors. Associated dyspnea. Dyspnea is severe with exertion. Associated with diaphoresis. She was seen a few weeks ago and ECHO and stress ordered. Stress normal. ECHO normal LV with minor valvular abnormalities c/w her age. She is doing pretty well. No significant chest discomfort. Past Medical History:   has a past medical history of Arthritis, Asthma, Atrial fibrillation ([facility] COPD (chronic obstructive pulmonary disease) ([facility] Diabetes mellitus ([facility] Hyperlipidemia, Hypertension, Influenza, and Thyroid disease. Surgical History:   has a past surgical history that includes Colonoscopy; Endoscopy, colon, diagnostic; Upper gastrointestinal endoscopy (N/A, 2021); and hiatal hernia repair (N/A, 10/8/2021). Social History:  Social History     Tobacco Use    Smoking status: Former Smoker     Packs/day: 1.00     Years: 10.00     Pack years: 10.00     Types: Cigarettes     Quit date: 10/5/1998     Years since quittin.3    Smokeless tobacco: Never Used    Tobacco comment: was only smoking socially on and off   Substance Use Topics    Alcohol use: No     Alcohol/week: 0.0 standard drinks        Family History:  family history includes Cancer in her brother; Heart Disease in her mother and sister; High Blood Pressure in her mother and sister; Stroke in her mother. Allergies:  Aspirin, Sulfa antibiotics, and Ace inhibitors     Home Medications:  Prior to Visit Medications    Medication Sig Taking?  Authorizing Provider   atorvastatin (LIPITOR) 40 MG tablet Take 1 tablet by mouth daily Yes Jess Patterson MD   fluticasone [facility]) 50 MCG/ACT nasal spray 2 sprays by Nasal route daily as needed for Allergies (as needed) Yes Cristal Sweet MD   Albuterol Sulfate, sensor, (PROAIR DIGIHALER) 108 (90 Base) MCG/ACT AEPB Inhale 2 puffs into the lungs every 6 hours as needed (SOB) Yes Cristal Sweet MD   omeprazole (PRILOSEC) 20 MG delayed release capsule TAKE 1 CAPSULE BY MOUTH EVERY MORNING BEFORE BREAKFAST Yes Cristal Sweet MD   levothyroxine (SYNTHROID) 100 MCG tablet TAKE 1 TABLET BY MOUTH DAILY Yes Katlin Reyez APRN - CNP   valsartan-hydroCHLOROthiazide (DIOVAN-HCT) 80-12.5 MG per tablet TAKE 1 TABLET BY MOUTH DAILY Yes OPAL Estrada - CNP   Cyanocobalamin (B-12 PO) Take by mouth daily Yes Historical Provider, MD   cetirizine (ZYRTEC) 10 MG tablet Take 10 mg by mouth daily  Yes Historical Provider, MD   acetaminophen (TYLENOL) 500 MG tablet Take 1,000 mg by mouth daily as needed  Yes Historical Provider, MD       [x] Medications and dosages reviewed.     ROS:  [x]Full ROS obtained and negative except as mentioned in HPI      Physical Examination:    Vitals:    02/07/22 1329   BP: 114/70   Site: Left Upper Arm   Position: Sitting   Cuff Size: Medium Adult   Pulse: 86   SpO2: 95%   Weight: 171 lb 3.2 oz (77.7 kg)   Height: 5' 1\" (1.549 m)        · GENERAL: Well developed, well nourished, No acute distress  · NEUROLOGICAL: Alert and oriented  · PSYCH: Calm affect  · SKIN: Warm and dry, No visible rash,   · EYES: Pupils equal and round, Sclera non-icteric,   · HENT:  External ears and nose unremarkable, mucus membranes moist  · MUSCULOSKELETAL: Normal cephalic, neck supple  · CAROTID: Normal upstroke, no bruits  · CARDIAC: JVP normal, Normal PMI, tachycardic, slightly irregular rate and rhythm, normal S1S2, No murmur, rub, or gallop  · RESPIRATORY: Normal respiratory effort, clear to auscultation bilaterally  · EXTREMITIES: No LE edema  · GASTROINTESTINAL: normal bowel sounds, soft, non-tender, No hepatomegaly     Myoview 1/2020   Summary  Normal myocardial perfusion. Normal LV size and systolic function. ECHO 1/2020   -Normal left ventricle size, wall thickness, and systolic function with an   estimated ejection fraction of 55%. Grade I diastolic dysfunction with   normal LV filling pressures. -Mild mitral regurgitation.   -The left atrium is mildly dilated. -The aortic valve appears sclerotic but opens well. -Mild tricuspid regurgitation with PASP of 31 mmHg. CARDIAC CATH 9/2020  Anatomy:   LM-normal  LAD-30% proximal and mid  Cx-normal  OM1- normal  RCA-20% mid and distal, dominant  RPDA- normal  LVEF-60%     Impression:  1. Mild nonobstructive CAD. 2.  Normal LV systolic function. 3.  Noncardiac chest pain. Assessment:     Chest Pain:  Non cardiac resolved. Shortness of breath:  Improved. Non cardiac  follow    HTN:  /70 (Site: Left Upper Arm, Position: Sitting, Cuff Size: Medium Adult)   Pulse 86   Ht 5' 1\" (1.549 m)   Wt 171 lb 3.2 oz (77.7 kg)   SpO2 95%   BMI 32.35 kg/m²   Well controlled. Continue current medications    Hyperlipidemia:  JQC=664. Goal ,100  JOQ=909  Increase lipitor to 40  Repeat labs 3 months    Plan:    Increase lipitor to 40  Labs 3 months  Then can see me prn and f/u PCP  Will need to get lipitor long term from PCP    Discussed in detail with pt.  She will call if issues arise      Thank you for allowing me to participate in the care of this individual.      Jennifer Ngo M.D., [facility]

## 2022-02-07 NOTE — PATIENT INSTRUCTIONS
Increase Lipitor to 40 mg daily  Fasting labs in 3 months  Follow up cholesterol with PCP  Follow up with Dr Ace Natarajan as needed

## 2022-03-05 DIAGNOSIS — E03.9 ACQUIRED HYPOTHYROIDISM: ICD-10-CM

## 2022-03-07 RX ORDER — LEVOTHYROXINE SODIUM 0.1 MG/1
TABLET ORAL
Qty: 90 TABLET | Refills: 1 | Status: SHIPPED | OUTPATIENT
Start: 2022-03-07 | End: 2022-09-01

## 2022-03-07 NOTE — TELEPHONE ENCOUNTER
Medication:   Requested Prescriptions     Pending Prescriptions Disp Refills    levothyroxine (SYNTHROID) 100 MCG tablet [Pharmacy Med Name: LEVOTHYROXINE 0.100MG (100MCG) TAB] 90 tablet 1     Sig: TAKE 1 TABLET BY MOUTH DAILY      Last Filled:      Patient Phone Number: [phone] (home)     Last appt: 2/1/2022   Next appt: 8/2/2022    Last OARRS:   RX Monitoring 12/21/2016   Attestation The Prescription Monitoring Report for this patient was reviewed today. Periodic Controlled Substance Monitoring Possible medication side effects, risk of tolerance and/or dependence, and alternative treatments discussed; No signs of potential drug abuse or diversion identified.      PDMP Monitoring:    Last PDMP Laurie Gonzalez as Reviewed [facility]):  Review User Review Instant Review Result          Preferred Pharmacy:   [address] [phone] [facility] [phone]  [address]  [address] [facility] [address]  Phone: [phone] Fax: [phone]

## 2022-06-15 RX ORDER — ATORVASTATIN CALCIUM 40 MG/1
40 TABLET, FILM COATED ORAL DAILY
Qty: 90 TABLET | Refills: 0 | OUTPATIENT
Start: 2022-06-15

## 2022-06-15 NOTE — TELEPHONE ENCOUNTER
Per Dr. Queta Gracia note from 2/7/22, cholesterol follow up (which includes Lipitor refills) need to come from his PCP as this patient is on a prn basis for follow up with us.

## 2022-06-15 NOTE — TELEPHONE ENCOUNTER
Received refill request for Atorvastatin from Miguel Wheeler.     Last ov:02/07/2022 MMK    Last labs:01/26/2022 Lipid & CMP    Last Refill:02/07/2022 #90 tabs w/ 0 refill    Next appointment:None

## 2022-06-21 RX ORDER — ATORVASTATIN CALCIUM 40 MG/1
40 TABLET, FILM COATED ORAL DAILY
Qty: 90 TABLET | Refills: 0 | OUTPATIENT
Start: 2022-06-21

## 2022-06-22 RX ORDER — ATORVASTATIN CALCIUM 40 MG/1
40 TABLET, FILM COATED ORAL DAILY
Qty: 90 TABLET | Refills: 0 | OUTPATIENT
Start: 2022-06-22

## 2022-06-22 NOTE — TELEPHONE ENCOUNTER
Per Dr. Ronny Paniagua note from 2/7/22, cholesterol follow up (which includes Lipitor refills) need to come from his PCP as this patient is on a prn basis for follow up with us.

## 2022-06-22 NOTE — TELEPHONE ENCOUNTER
Received refill request for Lipitor from [address]     Last ov: 02/07/2022 MMK    Last labs: 01/26/2022    Last Refill: 02/072022 #90 w/ 0 refills    Next appointment: NONE

## 2022-07-03 DIAGNOSIS — K21.9 GASTROESOPHAGEAL REFLUX DISEASE WITHOUT ESOPHAGITIS: ICD-10-CM

## 2022-07-03 DIAGNOSIS — I10 ESSENTIAL HYPERTENSION: ICD-10-CM

## 2022-07-05 RX ORDER — OMEPRAZOLE 20 MG/1
CAPSULE, DELAYED RELEASE ORAL
Qty: 90 CAPSULE | Refills: 2 | Status: SHIPPED | OUTPATIENT
Start: 2022-07-05

## 2022-07-05 RX ORDER — VALSARTAN AND HYDROCHLOROTHIAZIDE 80; 12.5 MG/1; MG/1
1 TABLET, FILM COATED ORAL DAILY
Qty: 90 TABLET | Refills: 0 | Status: SHIPPED | OUTPATIENT
Start: 2022-07-05

## 2022-07-05 NOTE — TELEPHONE ENCOUNTER
Medication:   Requested Prescriptions     Pending Prescriptions Disp Refills    valsartan-hydroCHLOROthiazide (DIOVAN-HCT) 80-12.5 MG per tablet [Pharmacy Med Name: VALSARTAN/HCTZ 80MG/12.5MG TABLETS] 90 tablet 3     Sig: TAKE 1 TABLET BY MOUTH DAILY      Last Filled:      Patient Phone Number: [phone] (home)     Last appt: 2/1/2022   Next appt: 8/2/2022    Last OARRS:   RX Monitoring 12/21/2016   Attestation The Prescription Monitoring Report for this patient was reviewed today. Periodic Controlled Substance Monitoring Possible medication side effects, risk of tolerance and/or dependence, and alternative treatments discussed; No signs of potential drug abuse or diversion identified.      PDMP Monitoring:    Last PDMP Augusto Galvin as Reviewed [facility]):  Review User Review Instant Review Result          Preferred Pharmacy:   Catie Cruz [address] [phone] - F [phone]  [address] [facility] [address]  Phone: [phone] Fax: [phone]    Lala Amaro [address] [place] [address] [phone] - f [phone]  [address]  [address] [facility] [address]  Phone: [phone] Fax: [phone]    [facility] [id number] [address] [address]  Phone: [phone] Fax: [phone]

## 2022-07-24 LAB
A/G RATIO: 1.4 (ref 1.2–2.2)
ALBUMIN SERPL-MCNC: 4.1 G/DL (ref 3.7–4.7)
ALP BLD-CCNC: 101 IU/L (ref 44–121)
ALT SERPL-CCNC: 5 IU/L (ref 0–32)
AMBIGUOUS ABBREVIATION: NORMAL
AST SERPL-CCNC: 16 IU/L (ref 0–40)
BILIRUB SERPL-MCNC: 1.4 MG/DL (ref 0–1.2)
BUN / CREAT RATIO: 16 (ref 12–28)
BUN BLDV-MCNC: 13 MG/DL (ref 8–27)
CALCIUM SERPL-MCNC: 9.3 MG/DL (ref 8.7–10.3)
CHLORIDE BLD-SCNC: 99 MMOL/L (ref 96–106)
CHOLESTEROL, TOTAL: 139 MG/DL (ref 100–199)
CO2: 27 MMOL/L (ref 20–29)
COMMENT: NORMAL
CREAT SERPL-MCNC: 0.79 MG/DL (ref 0.57–1)
ESTIMATED GLOMERULAR FILTRATION RATE CREATININE EQUATION: 76 ML/MIN/1.73
GLOBULIN: 3 G/DL (ref 1.5–4.5)
GLUCOSE BLD-MCNC: 96 MG/DL (ref 65–99)
HDLC SERPL-MCNC: 52 MG/DL
LDL CHOLESTEROL CALCULATED: 74 MG/DL (ref 0–99)
POTASSIUM SERPL-SCNC: 4.3 MMOL/L (ref 3.5–5.2)
SODIUM BLD-SCNC: 140 MMOL/L (ref 134–144)
TOTAL PROTEIN: 7.1 G/DL (ref 6–8.5)
TRIGL SERPL-MCNC: 65 MG/DL (ref 0–149)
VLDLC SERPL CALC-MCNC: 13 MG/DL (ref 5–40)

## 2022-08-01 ASSESSMENT — ENCOUNTER SYMPTOMS
WHEEZING: 1
HEARTBURN: 1
SHORTNESS OF BREATH: 1

## 2022-08-01 ASSESSMENT — COPD QUESTIONNAIRES: COPD: 1

## 2022-08-01 NOTE — PROGRESS NOTES
SUBJECTIVE:    Ginette Agosto is a [de-identified] y.o. female who presents for a follow up visit. Chief Complaint   Patient presents with    Medicare AWV     AWV/RTC- no new concerns. Discuss lab. Hyperlipidemia  This is a chronic problem. The current episode started more than 1 year ago. The problem is controlled. Lipid results: Cholesterol 139, triglycerides 65, HDL 52, LDL 74. Exacerbating diseases include hypothyroidism and obesity. Factors aggravating her hyperlipidemia include thiazides. Associated symptoms include shortness of breath. Current antihyperlipidemic treatment includes statins. The current treatment provides significant improvement of lipids. Compliance problems include adherence to exercise and adherence to diet. Risk factors for coronary artery disease include dyslipidemia, hypertension, obesity, a sedentary lifestyle and post-menopausal.   Hypertension  This is a chronic problem. The current episode started more than 1 year ago. Associated symptoms include shortness of breath. Pertinent negatives include no headaches. Risk factors for coronary artery disease include dyslipidemia, obesity, sedentary lifestyle and post-menopausal state. Past treatments include angiotensin blockers and diuretics. Compliance problems include exercise and diet. COPD  She complains of shortness of breath and wheezing. There is no cough. This is a chronic problem. The current episode started more than 1 year ago. The problem occurs intermittently. The problem has been waxing and waning. Associated symptoms include heartburn. Pertinent negatives include no headaches, postnasal drip or rhinorrhea. Her symptoms are aggravated by exercise and URI. Her symptoms are alleviated by beta-agonist. She reports moderate improvement on treatment. Her past medical history is significant for bronchitis and COPD. Gastroesophageal Reflux  She complains of heartburn and wheezing. She reports no coughing.  This is a chronic problem. The current episode started more than 1 year ago. The heartburn is located in the substernum. The heartburn is of mild intensity. The heartburn does not wake her from sleep. The heartburn does not limit her activity. The heartburn doesn't change with position. The symptoms are aggravated by certain foods. Pertinent negatives include no fatigue. Risk factors include obesity and lack of exercise. She has tried a PPI for the symptoms. The treatment provided significant relief. Patient's medications, allergies, past medical,surgical, social and family histories were reviewed and updated as appropriate. Past Medical History:   Diagnosis Date    Arthritis     Asthma     Atrial fibrillation ([facility] [address]     past history    COPD (chronic obstructive pulmonary disease) ([facility] [address]     Diabetes mellitus ([facility] [address]     no meds    Hyperlipidemia     Hypertension     Influenza 2018    Thyroid disease      Past Surgical History:   Procedure Laterality Date    COLONOSCOPY      ENDOSCOPY, COLON, DIAGNOSTIC      HIATAL HERNIA REPAIR N/A 10/8/2021    LAPAROSCOPIC HIATAL HERNIA REPAIR, NISSEN FUNDOPLICATION performed by Annamaria Saldivar MD at [address] [facility] N/A 2021    EGD DIAGNOSTIC ONLY performed by Mary Ross MD at [address]     Family History   Problem Relation Age of Onset    Heart Disease Mother     High Blood Pressure Mother     Stroke Mother     Heart Disease Sister     High Blood Pressure Sister     Cancer Brother         lung     Social History     Tobacco Use    Smoking status: Former     Packs/day: 1.00     Years: 10.00     Pack years: 10.00     Types: Cigarettes     Quit date: 10/5/1998     Years since quittin.8    Smokeless tobacco: Never    Tobacco comments:     was only smoking socially on and off   Substance Use Topics    Alcohol use: No     Alcohol/week: 0.0 standard drinks      Allergies   Allergen Reactions    Aspirin      Recurrent GI blood Loss? Sulfa Antibiotics      Other reaction(s): Other (See Comments)  hallucinations  hallucinations    Ace Inhibitors Palpitations     Swelling and cough  cough     Current Outpatient Medications on File Prior to Visit   Medication Sig Dispense Refill    valsartan-hydroCHLOROthiazide (DIOVAN-HCT) 80-12.5 MG per tablet TAKE 1 TABLET BY MOUTH DAILY 90 tablet 0    omeprazole (PRILOSEC) 20 MG delayed release capsule TAKE 1 CAPSULE BY MOUTH EVERY MORNING BEFORE BREAKFAST 90 capsule 2    levothyroxine (SYNTHROID) 100 MCG tablet TAKE 1 TABLET BY MOUTH DAILY 90 tablet 1    atorvastatin (LIPITOR) 40 MG tablet Take 1 tablet by mouth daily 90 tablet 0    fluticasone (FLONASE) 50 MCG/ACT nasal spray 2 sprays by Nasal route daily as needed for Allergies (as needed) 1 each 3    Albuterol Sulfate, sensor, (PROAIR DIGIHALER) 108 (90 Base) MCG/ACT AEPB Inhale 2 puffs into the lungs every 6 hours as needed (SOB) 1 each 5    Cyanocobalamin (B-12 PO) Take by mouth daily      cetirizine (ZYRTEC) 10 MG tablet Take 10 mg by mouth daily       acetaminophen (TYLENOL) 500 MG tablet Take 1,000 mg by mouth daily as needed        No current facility-administered medications on file prior to visit. Review of Systems   Constitutional:  Negative for activity change and fatigue. HENT:  Negative for congestion, postnasal drip and rhinorrhea. Respiratory:  Positive for shortness of breath and wheezing. Negative for cough and chest tightness. Cardiovascular:  Negative for leg swelling. Gastrointestinal:  Positive for heartburn. Negative for constipation and diarrhea. Genitourinary:  Negative for difficulty urinating. Musculoskeletal:  Positive for arthralgias (fingers, knees). Negative for back pain. Neurological:  Negative for dizziness, light-headedness and headaches. Psychiatric/Behavioral:  Positive for sleep disturbance (staying up late watching TV). Negative for dysphoric mood. The patient is not nervous/anxious. OBJECTIVE:    /80   Temp 97.2 °F (36.2 °C)   Ht 5' 1\" (1.549 m)   Wt 179 lb (81.2 kg)   BMI 33.82 kg/m²    Physical Exam  Constitutional:       General: She is not in acute distress. Appearance: She is well-developed. She is obese. HENT:      Head: Normocephalic and atraumatic. Right Ear: Tympanic membrane and external ear normal.      Left Ear: Tympanic membrane and external ear normal.      Nose: Nose normal.      Mouth/Throat:      Mouth: Mucous membranes are moist.      Pharynx: No posterior oropharyngeal erythema. Eyes:      General:         Right eye: No discharge. Conjunctiva/sclera: Conjunctivae normal.   Neck:      Thyroid: No thyromegaly. Vascular: No JVD. Trachea: No tracheal deviation. Cardiovascular:      Rate and Rhythm: Normal rate and regular rhythm. Heart sounds: Normal heart sounds. Pulmonary:      Effort: Pulmonary effort is normal. No respiratory distress. Breath sounds: Normal breath sounds. No rales. Musculoskeletal:      Cervical back: Normal range of motion and neck supple. Right lower leg: No edema. Left lower leg: No edema. Lymphadenopathy:      Cervical: No cervical adenopathy. Skin:     General: Skin is warm and dry. Neurological:      Mental Status: She is alert and oriented to person, place, and time. Psychiatric:         Mood and Affect: Mood normal.         Behavior: Behavior normal.       ASSESSMENT/PLAN:    Alban Mejias was seen today for medicare aw. Diagnoses and all orders for this visit:    Acquired hypothyroidism  Doing well with current dose of levothyroxine at 100 mcg daily. We will continue current dose.   Her last TSH was in January and I will order another for her 6-month follow-up    Gastroesophageal reflux disease without esophagitis  Symptoms in good control with use of omeprazole 20 mg daily    Essential hypertension  Pressure is in good control with current medication    Mixed simple and Increased Fatigue, Loneliness, Social Isolation, Stress or Anger?: No  Do you get the social and emotional support that you need?: Yes  Do you have a Living Will?: (!) No    Advance Directives       Power of Saadia Cortez Will ACP-Advance Directive ACP-Power of     Not on File Not on File Not on File Not on File        General Health Risk Interventions:  No Living Will: Advance Care Planning addressed with patient today    Health Habits/Nutrition:  Physical Activity: Unknown    Days of Exercise per Week: 7 days    Minutes of Exercise per Session: Not on file     Have you lost any weight without trying in the past 3 months?: No  Body mass index: (!) 33.82  Have you seen the dentist within the past year?: (!) No  Health Habits/Nutrition Interventions:  Dental exam overdue:  Patient has dentures    Hearing/Vision:  Do you or your family notice any trouble with your hearing that hasn't been managed with hearing aids?: (!) Yes (right ear)  Do you have difficulty driving, watching TV, or doing any of your daily activities because of your eyesight?: No  Have you had an eye exam within the past year?: (!) No  No results found. Hearing/Vision Interventions:  Hearing concerns:  patient declines any further evaluation/treatment for hearing issues  Vision concerns:  patient encouraged to make appointment with his/her eye specialist            Objective   Vitals:    08/02/22 1409   BP: 118/80   Temp: 97.2 °F (36.2 °C)   Weight: 179 lb (81.2 kg)   Height: 5' 1\" (1.549 m)      Body mass index is 33.82 kg/m². Allergies   Allergen Reactions    Aspirin      Recurrent GI blood Loss? Sulfa Antibiotics      Other reaction(s): Other (See Comments)  hallucinations  hallucinations    Ace Inhibitors Palpitations     Swelling and cough  cough     Prior to Visit Medications    Medication Sig Taking?  Authorizing Provider   valsartan-hydroCHLOROthiazide (DIOVAN-HCT) 80-12.5 MG per tablet TAKE 1 TABLET BY MOUTH DAILY Yes Frankie Bermudez MD   omeprazole (PRILOSEC) 20 MG delayed release capsule TAKE 1 CAPSULE BY MOUTH EVERY MORNING BEFORE BREAKFAST Yes Frankie Bermudez MD   levothyroxine (SYNTHROID) 100 MCG tablet TAKE 1 TABLET BY MOUTH DAILY Yes Frankie Bermudez MD   atorvastatin (LIPITOR) 40 MG tablet Take 1 tablet by mouth daily Yes Sapna Hartman MD   fluticasone (FLONASE) 50 MCG/ACT nasal spray 2 sprays by Nasal route daily as needed for Allergies (as needed) Yes Frankie Bermudez MD   Albuterol Sulfate, sensor, (PROAIR DIGIHALER) 108 (90 Base) MCG/ACT AEPB Inhale 2 puffs into the lungs every 6 hours as needed (SOB) Yes Frankie Bermudez MD   Cyanocobalamin (B-12 PO) Take by mouth daily Yes Historical Provider, MD   cetirizine (ZYRTEC) 10 MG tablet Take 10 mg by mouth daily  Yes Historical Provider, MD   acetaminophen (TYLENOL) 500 MG tablet Take 1,000 mg by mouth daily as needed  Yes Historical Provider, MD Roberts (Including outside providers/suppliers regularly involved in providing care):   Patient Care Team:  Frankie Bermudez MD as PCP - General (Family Medicine)  Frankie Bermudez MD as PCP - [facility]  Yudy Mccoy MD as Surgeon (General Surgery)     Reviewed and updated this visit:  Tobacco  Allergies  Meds  Med Hx  Surg Hx  Soc Hx  Fam Hx

## 2022-08-02 ENCOUNTER — OFFICE VISIT (OUTPATIENT)
Dept: FAMILY MEDICINE CLINIC | Age: 82
End: 2022-08-02
Payer: MEDICARE

## 2022-08-02 VITALS
HEIGHT: 61 IN | DIASTOLIC BLOOD PRESSURE: 80 MMHG | SYSTOLIC BLOOD PRESSURE: 118 MMHG | WEIGHT: 179 LBS | BODY MASS INDEX: 33.79 KG/M2 | TEMPERATURE: 97.2 F

## 2022-08-02 DIAGNOSIS — Z00.00 INITIAL MEDICARE ANNUAL WELLNESS VISIT: ICD-10-CM

## 2022-08-02 DIAGNOSIS — E03.9 ACQUIRED HYPOTHYROIDISM: Primary | ICD-10-CM

## 2022-08-02 DIAGNOSIS — E78.00 PURE HYPERCHOLESTEROLEMIA: ICD-10-CM

## 2022-08-02 DIAGNOSIS — J41.8 MIXED SIMPLE AND MUCOPURULENT CHRONIC BRONCHITIS (HCC): ICD-10-CM

## 2022-08-02 DIAGNOSIS — K21.9 GASTROESOPHAGEAL REFLUX DISEASE WITHOUT ESOPHAGITIS: ICD-10-CM

## 2022-08-02 DIAGNOSIS — I10 ESSENTIAL HYPERTENSION: ICD-10-CM

## 2022-08-02 PROCEDURE — 1036F TOBACCO NON-USER: CPT | Performed by: FAMILY MEDICINE

## 2022-08-02 PROCEDURE — 1090F PRES/ABSN URINE INCON ASSESS: CPT | Performed by: FAMILY MEDICINE

## 2022-08-02 PROCEDURE — 3023F SPIROM DOC REV: CPT | Performed by: FAMILY MEDICINE

## 2022-08-02 PROCEDURE — G8427 DOCREV CUR MEDS BY ELIG CLIN: HCPCS | Performed by: FAMILY MEDICINE

## 2022-08-02 PROCEDURE — G8399 PT W/DXA RESULTS DOCUMENT: HCPCS | Performed by: FAMILY MEDICINE

## 2022-08-02 PROCEDURE — G8417 CALC BMI ABV UP PARAM F/U: HCPCS | Performed by: FAMILY MEDICINE

## 2022-08-02 PROCEDURE — G0438 PPPS, INITIAL VISIT: HCPCS | Performed by: FAMILY MEDICINE

## 2022-08-02 PROCEDURE — 1124F ACP DISCUSS-NO DSCNMKR DOCD: CPT | Performed by: FAMILY MEDICINE

## 2022-08-02 PROCEDURE — 99214 OFFICE O/P EST MOD 30 MIN: CPT | Performed by: FAMILY MEDICINE

## 2022-08-02 SDOH — ECONOMIC STABILITY: FOOD INSECURITY: WITHIN THE PAST 12 MONTHS, THE FOOD YOU BOUGHT JUST DIDN'T LAST AND YOU DIDN'T HAVE MONEY TO GET MORE.: NEVER TRUE

## 2022-08-02 SDOH — ECONOMIC STABILITY: FOOD INSECURITY: WITHIN THE PAST 12 MONTHS, YOU WORRIED THAT YOUR FOOD WOULD RUN OUT BEFORE YOU GOT MONEY TO BUY MORE.: NEVER TRUE

## 2022-08-02 ASSESSMENT — ENCOUNTER SYMPTOMS
RHINORRHEA: 0
DIARRHEA: 0
BACK PAIN: 0
CHEST TIGHTNESS: 0
CONSTIPATION: 0
COUGH: 0

## 2022-08-02 ASSESSMENT — PATIENT HEALTH QUESTIONNAIRE - PHQ9
SUM OF ALL RESPONSES TO PHQ9 QUESTIONS 1 & 2: 2
SUM OF ALL RESPONSES TO PHQ QUESTIONS 1-9: 2
2. FEELING DOWN, DEPRESSED OR HOPELESS: 1
SUM OF ALL RESPONSES TO PHQ QUESTIONS 1-9: 2
1. LITTLE INTEREST OR PLEASURE IN DOING THINGS: 1
SUM OF ALL RESPONSES TO PHQ QUESTIONS 1-9: 2
SUM OF ALL RESPONSES TO PHQ QUESTIONS 1-9: 2

## 2022-08-02 ASSESSMENT — LIFESTYLE VARIABLES: HOW OFTEN DO YOU HAVE A DRINK CONTAINING ALCOHOL: NEVER

## 2022-08-02 ASSESSMENT — SOCIAL DETERMINANTS OF HEALTH (SDOH): HOW HARD IS IT FOR YOU TO PAY FOR THE VERY BASICS LIKE FOOD, HOUSING, MEDICAL CARE, AND HEATING?: NOT HARD AT ALL

## 2022-08-02 NOTE — PATIENT INSTRUCTIONS
Personalized Preventive Plan for Sg Dao - 8/2/2022  Medicare offers a range of preventive health benefits. Some of the tests and screenings are paid in full while other may be subject to a deductible, co-insurance, and/or copay. Some of these benefits include a comprehensive review of your medical history including lifestyle, illnesses that may run in your family, and various assessments and screenings as appropriate. After reviewing your medical record and screening and assessments performed today your provider may have ordered immunizations, labs, imaging, and/or referrals for you. A list of these orders (if applicable) as well as your Preventive Care list are included within your After Visit Summary for your review. Other Preventive Recommendations:    A preventive eye exam performed by an eye specialist is recommended every 1-2 years to screen for glaucoma; cataracts, macular degeneration, and other eye disorders. A preventive dental visit is recommended every 6 months. Try to get at least 150 minutes of exercise per week or 10,000 steps per day on a pedometer . Order or download the FREE \"Exercise & Physical Activity: Your Everyday Guide\" from [employer]. Call 7-621.203.7815 or search [employer] online. You need 0854-6870 mg of calcium and 6551-6587 IU of vitamin D per day. It is possible to meet your calcium requirement with diet alone, but a vitamin D supplement is usually necessary to meet this goal.  When exposed to the sun, use a sunscreen that protects against both UVA and UVB radiation with an SPF of 30 or greater. Reapply every 2 to 3 hours or after sweating, drying off with a towel, or swimming. Always wear a seat belt when traveling in a car. Always wear a helmet when riding a bicycle or motorcycle.

## 2022-09-01 DIAGNOSIS — E03.9 ACQUIRED HYPOTHYROIDISM: ICD-10-CM

## 2022-09-01 RX ORDER — LEVOTHYROXINE SODIUM 0.1 MG/1
TABLET ORAL
Qty: 90 TABLET | Refills: 1 | Status: SHIPPED | OUTPATIENT
Start: 2022-09-01

## 2022-09-06 NOTE — CARE COORDINATION
[facility] Transitions Follow Up Call    10/20/2021    Patient: Mike Castleman  Patient : 1940   MRN: <[id number]>  Reason for Admission: Laparoscopic repair of paraesophageal hiatal hernia,  Discharge Date: 10/12/21 RARS: Readmission Risk Score: 6         Spoke with: Elie Chávez  Patient states she is feeling fine. Appetite and fluid intake is good. She is still on soft foods. Her incision site is good. No redness or swelling. She denies pain or fever. No problems with bladder or bowel elimination. Patient has had f/u with PCP. She goes back next week. She sees her surgeon tomorrow. Care Transitions Follow Up Call    Needs to be reviewed by the provider   Additional needs identified to be addressed with provider: No  none             Method of communication with provider : none      Care Transition Nurse (CTN) contacted the patient by telephone to follow up after admission on 10/8/2021. Verified name and  with patient as identifiers. Addressed changes since last contact: none  Discussed follow-up appointments. If no appointment was previously scheduled, appointment scheduling offered: NA. Is follow up appointment scheduled within 7 days of discharge? NA. Advance Care Planning:   Does patient have an Advance Directive: not on file. CTN reviewed discharge instructions, medical action plan and red flags with patient and discussed any barriers to care and/or understanding of plan of care after discharge. Discussed appropriate site of care based on symptoms and resources available to patient including: Urgent care clinics. The patient agrees to contact the PCP office for questions related to their healthcare. Patients top risk factors for readmission: medical condition  Interventions to address risk factors:       Non-[facility] follow up appointment(s):    CTN provided contact information for future needs.  Plan for follow-up call in 7-10 days based on severity of symptoms and risk factors. Plan for next call: self management        Care Transitions Subsequent and Final Call    Subsequent and Final Calls  Do you have any ongoing symptoms?: No  Have your medications changed?: No  Do you have any questions related to your medications?: No  Do you currently have any active services?: No  Do you have any needs or concerns that I can assist you with?: No  Identified Barriers: None  Care Transitions Interventions  Other Interventions:            Follow Up  Future Appointments   Date Time Provider Dino Hilton   10/21/2021 12:20 PM Lisbet Stone MD [facility]   11/4/2021 12:45 PM Jose Banegas MD [address], LPN no chest pain, no cough, and no shortness of breath.

## 2022-12-20 DIAGNOSIS — I10 ESSENTIAL HYPERTENSION: ICD-10-CM

## 2022-12-20 RX ORDER — VALSARTAN AND HYDROCHLOROTHIAZIDE 80; 12.5 MG/1; MG/1
1 TABLET, FILM COATED ORAL DAILY
Qty: 90 TABLET | Refills: 0 | Status: SHIPPED | OUTPATIENT
Start: 2022-12-20

## 2022-12-30 NOTE — PROGRESS NOTES
diet  Normal activities  All questions answered    Call for any addn concerns    Arsen Pimentel MD
children/spouse

## 2023-01-25 LAB
A/G RATIO: 1.3 (ref 1.2–2.2)
ALBUMIN SERPL-MCNC: 4 G/DL (ref 3.7–4.7)
ALP BLD-CCNC: 99 IU/L (ref 44–121)
ALT SERPL-CCNC: 6 IU/L (ref 0–32)
AST SERPL-CCNC: 19 IU/L (ref 0–40)
BASOPHILS ABSOLUTE: 0 X10E3/UL (ref 0–0.2)
BASOPHILS RELATIVE PERCENT: 1 %
BILIRUB SERPL-MCNC: 1.1 MG/DL (ref 0–1.2)
BUN / CREAT RATIO: 19 (ref 12–28)
BUN BLDV-MCNC: 15 MG/DL (ref 8–27)
CALCIUM SERPL-MCNC: 9.1 MG/DL (ref 8.7–10.3)
CHLORIDE BLD-SCNC: 100 MMOL/L (ref 96–106)
CHOLESTEROL, TOTAL: 177 MG/DL (ref 100–199)
CO2: 28 MMOL/L (ref 20–29)
COMMENT: ABNORMAL
CREAT SERPL-MCNC: 0.78 MG/DL (ref 0.57–1)
EOSINOPHILS ABSOLUTE: 0.1 X10E3/UL (ref 0–0.4)
EOSINOPHILS RELATIVE PERCENT: 2 %
ERYTHROCYTES, NUCLEATED/100 LEU: NORMAL
ESTIMATED GLOMERULAR FILTRATION RATE CREATININE EQUATION: 77 ML/MIN/1.73
GLOBULIN: 3 G/DL (ref 1.5–4.5)
GLUCOSE BLD-MCNC: 95 MG/DL (ref 70–99)
HCT VFR BLD CALC: 44 % (ref 34–46.6)
HDLC SERPL-MCNC: 51 MG/DL
HEMOGLOBIN: 14.6 G/DL (ref 11.1–15.9)
IMMATURE CELLS ABSOLUTE COUNT: NORMAL
IMMATURE GRANS (ABS): 0 X10E3/UL (ref 0–0.1)
IMMATURE GRANULOCYTES: 0 %
LDL CHOLESTEROL CALCULATED: 113 MG/DL (ref 0–99)
LYMPHOCYTES ABSOLUTE: 2 X10E3/UL (ref 0.7–3.1)
LYMPHOCYTES RELATIVE PERCENT: 32 %
MCH RBC QN AUTO: 28.3 PG (ref 26.6–33)
MCHC RBC AUTO-ENTMCNC: 33.2 G/DL (ref 31.5–35.7)
MCV RBC AUTO: 85 FL (ref 79–97)
MONOCYTES ABSOLUTE: 0.6 X10E3/UL (ref 0.1–0.9)
MONOCYTES RELATIVE PERCENT: 10 %
MORPHOLOGY: NORMAL
NEUTROPHILS ABSOLUTE: 3.5 X10E3/UL (ref 1.4–7)
PDW BLD-RTO: 11.9 % (ref 11.7–15.4)
PLATELET # BLD: 284 X10E3/UL (ref 150–450)
POTASSIUM SERPL-SCNC: 4.1 MMOL/L (ref 3.5–5.2)
RBC # BLD: 5.15 X10E6/UL (ref 3.77–5.28)
SEGMENTED NEUTROPHILS RELATIVE PERCENT: 55 %
SODIUM BLD-SCNC: 141 MMOL/L (ref 134–144)
TOTAL PROTEIN: 7 G/DL (ref 6–8.5)
TRIGL SERPL-MCNC: 70 MG/DL (ref 0–149)
TSH SERPL DL<=0.05 MIU/L-ACNC: 1.78 UIU/ML (ref 0.45–4.5)
VLDLC SERPL CALC-MCNC: 13 MG/DL (ref 5–40)
WBC # BLD: 6.2 X10E3/UL (ref 3.4–10.8)

## 2023-02-02 ENCOUNTER — OFFICE VISIT (OUTPATIENT)
Dept: FAMILY MEDICINE CLINIC | Age: 83
End: 2023-02-02
Payer: MEDICARE

## 2023-02-02 VITALS
WEIGHT: 192 LBS | SYSTOLIC BLOOD PRESSURE: 120 MMHG | DIASTOLIC BLOOD PRESSURE: 80 MMHG | TEMPERATURE: 98 F | BODY MASS INDEX: 36.28 KG/M2

## 2023-02-02 DIAGNOSIS — Z23 NEED FOR PROPHYLACTIC VACCINATION AND INOCULATION AGAINST INFLUENZA: Primary | ICD-10-CM

## 2023-02-02 DIAGNOSIS — K21.9 GASTROESOPHAGEAL REFLUX DISEASE WITHOUT ESOPHAGITIS: ICD-10-CM

## 2023-02-02 DIAGNOSIS — E03.9 ACQUIRED HYPOTHYROIDISM: ICD-10-CM

## 2023-02-02 DIAGNOSIS — I47.1 MULTIFOCAL ATRIAL TACHYCARDIA (HCC): ICD-10-CM

## 2023-02-02 DIAGNOSIS — J41.8 MIXED SIMPLE AND MUCOPURULENT CHRONIC BRONCHITIS (HCC): ICD-10-CM

## 2023-02-02 DIAGNOSIS — I10 ESSENTIAL HYPERTENSION: ICD-10-CM

## 2023-02-02 DIAGNOSIS — R73.9 HYPERGLYCEMIA: ICD-10-CM

## 2023-02-02 DIAGNOSIS — E78.00 PURE HYPERCHOLESTEROLEMIA: ICD-10-CM

## 2023-02-02 PROCEDURE — G8484 FLU IMMUNIZE NO ADMIN: HCPCS | Performed by: FAMILY MEDICINE

## 2023-02-02 PROCEDURE — 99214 OFFICE O/P EST MOD 30 MIN: CPT | Performed by: FAMILY MEDICINE

## 2023-02-02 PROCEDURE — G8427 DOCREV CUR MEDS BY ELIG CLIN: HCPCS | Performed by: FAMILY MEDICINE

## 2023-02-02 PROCEDURE — 90694 VACC AIIV4 NO PRSRV 0.5ML IM: CPT | Performed by: FAMILY MEDICINE

## 2023-02-02 PROCEDURE — G8417 CALC BMI ABV UP PARAM F/U: HCPCS | Performed by: FAMILY MEDICINE

## 2023-02-02 PROCEDURE — 1036F TOBACCO NON-USER: CPT | Performed by: FAMILY MEDICINE

## 2023-02-02 PROCEDURE — G0008 ADMIN INFLUENZA VIRUS VAC: HCPCS | Performed by: FAMILY MEDICINE

## 2023-02-02 PROCEDURE — G8399 PT W/DXA RESULTS DOCUMENT: HCPCS | Performed by: FAMILY MEDICINE

## 2023-02-02 PROCEDURE — 3079F DIAST BP 80-89 MM HG: CPT | Performed by: FAMILY MEDICINE

## 2023-02-02 PROCEDURE — 3023F SPIROM DOC REV: CPT | Performed by: FAMILY MEDICINE

## 2023-02-02 PROCEDURE — 1124F ACP DISCUSS-NO DSCNMKR DOCD: CPT | Performed by: FAMILY MEDICINE

## 2023-02-02 PROCEDURE — 1090F PRES/ABSN URINE INCON ASSESS: CPT | Performed by: FAMILY MEDICINE

## 2023-02-02 PROCEDURE — 3074F SYST BP LT 130 MM HG: CPT | Performed by: FAMILY MEDICINE

## 2023-02-02 RX ORDER — ATORVASTATIN CALCIUM 40 MG/1
40 TABLET, FILM COATED ORAL DAILY
Qty: 90 TABLET | Refills: 3 | Status: SHIPPED | OUTPATIENT
Start: 2023-02-02

## 2023-02-02 RX ORDER — OMEPRAZOLE 20 MG/1
CAPSULE, DELAYED RELEASE ORAL
Qty: 90 CAPSULE | Refills: 2 | Status: SHIPPED | OUTPATIENT
Start: 2023-02-02

## 2023-02-02 SDOH — ECONOMIC STABILITY: HOUSING INSECURITY
IN THE LAST 12 MONTHS, WAS THERE A TIME WHEN YOU DID NOT HAVE A STEADY PLACE TO SLEEP OR SLEPT IN A SHELTER (INCLUDING NOW)?: NO

## 2023-02-02 SDOH — ECONOMIC STABILITY: FOOD INSECURITY: WITHIN THE PAST 12 MONTHS, YOU WORRIED THAT YOUR FOOD WOULD RUN OUT BEFORE YOU GOT MONEY TO BUY MORE.: NEVER TRUE

## 2023-02-02 SDOH — ECONOMIC STABILITY: FOOD INSECURITY: WITHIN THE PAST 12 MONTHS, THE FOOD YOU BOUGHT JUST DIDN'T LAST AND YOU DIDN'T HAVE MONEY TO GET MORE.: NEVER TRUE

## 2023-02-02 SDOH — ECONOMIC STABILITY: INCOME INSECURITY: HOW HARD IS IT FOR YOU TO PAY FOR THE VERY BASICS LIKE FOOD, HOUSING, MEDICAL CARE, AND HEATING?: NOT HARD AT ALL

## 2023-02-02 ASSESSMENT — PATIENT HEALTH QUESTIONNAIRE - PHQ9
2. FEELING DOWN, DEPRESSED OR HOPELESS: 0
5. POOR APPETITE OR OVEREATING: 0
6. FEELING BAD ABOUT YOURSELF - OR THAT YOU ARE A FAILURE OR HAVE LET YOURSELF OR YOUR FAMILY DOWN: 0
1. LITTLE INTEREST OR PLEASURE IN DOING THINGS: 0
3. TROUBLE FALLING OR STAYING ASLEEP: 0
4. FEELING TIRED OR HAVING LITTLE ENERGY: 0
SUM OF ALL RESPONSES TO PHQ QUESTIONS 1-9: 0
8. MOVING OR SPEAKING SO SLOWLY THAT OTHER PEOPLE COULD HAVE NOTICED. OR THE OPPOSITE, BEING SO FIGETY OR RESTLESS THAT YOU HAVE BEEN MOVING AROUND A LOT MORE THAN USUAL: 0
SUM OF ALL RESPONSES TO PHQ QUESTIONS 1-9: 0
7. TROUBLE CONCENTRATING ON THINGS, SUCH AS READING THE NEWSPAPER OR WATCHING TELEVISION: 0
9. THOUGHTS THAT YOU WOULD BE BETTER OFF DEAD, OR OF HURTING YOURSELF: 0
SUM OF ALL RESPONSES TO PHQ9 QUESTIONS 1 & 2: 0

## 2023-02-02 ASSESSMENT — ENCOUNTER SYMPTOMS
COUGH: 0
BACK PAIN: 0
DIARRHEA: 0
HAIR LOSS: 0
WHEEZING: 0
HEARTBURN: 1
CONSTIPATION: 0
DIFFICULTY BREATHING: 1
SHORTNESS OF BREATH: 0

## 2023-02-02 NOTE — PROGRESS NOTES
SUBJECTIVE:    Bria Mondragon is a [de-identified] y.o. female who presents for a follow up visit. Chief Complaint   Patient presents with    Follow-up     Patient is here for a follow up. No new concerns. Discuss lab. Asthma  She complains of difficulty breathing. There is no cough, shortness of breath or wheezing. This is a chronic problem. The current episode started more than 1 year ago. The problem occurs rarely. The cough is non-productive. Associated symptoms include dyspnea on exertion and heartburn. Pertinent negatives include no weight loss. Her symptoms are aggravated by exercise, strenuous activity and change in weather. Her symptoms are alleviated by beta-agonist. She reports significant improvement on treatment. Her past medical history is significant for asthma. Hyperlipidemia  This is a chronic problem. The current episode started more than 1 year ago. Lipid results: Total cholesterol 177, triglycerides 70, HDL 51, . Exacerbating diseases include hypothyroidism and obesity. Factors aggravating her hyperlipidemia include thiazides. Pertinent negatives include no shortness of breath. Current antihyperlipidemic treatment includes statins. The current treatment provides significant improvement of lipids. Compliance problems include adherence to exercise and adherence to diet. Hypertension  This is a chronic problem. The current episode started more than 1 year ago. The problem is controlled. Pertinent negatives include no palpitations or shortness of breath. Risk factors for coronary artery disease include sedentary lifestyle, obesity and dyslipidemia. Past treatments include angiotensin blockers and diuretics. The current treatment provides significant improvement. Compliance problems include exercise and diet. Identifiable causes of hypertension include a thyroid problem. Gastroesophageal Reflux  She complains of heartburn. She reports no coughing or no wheezing.  This is a chronic problem. The current episode started more than 1 year ago. The problem occurs rarely. The heartburn is located in the substernum. The heartburn is of mild intensity. The heartburn does not wake her from sleep. The heartburn does not limit her activity. The heartburn doesn't change with position. The symptoms are aggravated by certain foods. Pertinent negatives include no fatigue or weight loss. Risk factors include obesity. She has tried a PPI for the symptoms. The treatment provided significant relief. Past procedures include an EGD. Thyroid Problem  Presents for follow-up visit. Symptoms include weight gain. Patient reports no anxiety, cold intolerance, constipation, depressed mood, diaphoresis, diarrhea, dry skin, fatigue, hair loss, leg swelling, palpitations or weight loss. The symptoms have been stable. Her past medical history is significant for hyperlipidemia. TSH done on 1/24/2023 was 1.78 and her current dose of Synthroid is 100 mcg daily. Patient's medications, allergies, past medical,surgical, social and family histories were reviewed and updated as appropriate.      Past Medical History:   Diagnosis Date    Arthritis     Asthma     Atrial fibrillation ([facility]     past history    COPD (chronic obstructive pulmonary disease) ([facility]     Diabetes mellitus ([facility] [address]     no meds    Hyperlipidemia     Hypertension     Influenza 02/08/2018    Thyroid disease      Past Surgical History:   Procedure Laterality Date    COLONOSCOPY      ENDOSCOPY, COLON, DIAGNOSTIC      HIATAL HERNIA REPAIR N/A 10/8/2021    LAPAROSCOPIC HIATAL HERNIA REPAIR, NISSEN FUNDOPLICATION performed by Everette Ralph MD at [address] [facility] N/A 9/2/2021    EGD DIAGNOSTIC ONLY performed by Dakota Hummel MD at [address] [place] [address]     Family History   Problem Relation Age of Onset    Heart Disease Mother     High Blood Pressure Mother     Stroke Mother     Heart Disease Sister     High Blood Pressure Sister Cancer Brother         lung     Social History     Tobacco Use    Smoking status: Former     Packs/day: 1.00     Years: 10.00     Pack years: 10.00     Types: Cigarettes     Quit date: 10/5/1998     Years since quittin.3    Smokeless tobacco: Never    Tobacco comments:     was only smoking socially on and off   Substance Use Topics    Alcohol use: No     Alcohol/week: 0.0 standard drinks      Allergies   Allergen Reactions    Aspirin      Recurrent GI blood Loss? Sulfa Antibiotics      Other reaction(s): Other (See Comments)  hallucinations  hallucinations    Ace Inhibitors Palpitations     Swelling and cough  cough     Current Outpatient Medications on File Prior to Visit   Medication Sig Dispense Refill    valsartan-hydroCHLOROthiazide (DIOVAN-HCT) 80-12.5 MG per tablet TAKE 1 TABLET BY MOUTH DAILY 90 tablet 0    levothyroxine (SYNTHROID) 100 MCG tablet TAKE 1 TABLET BY MOUTH DAILY 90 tablet 1    fluticasone (FLONASE) 50 MCG/ACT nasal spray 2 sprays by Nasal route daily as needed for Allergies (as needed) 1 each 3    Albuterol Sulfate, sensor, (PROAIR DIGIHALER) 108 (90 Base) MCG/ACT AEPB Inhale 2 puffs into the lungs every 6 hours as needed (SOB) 1 each 5    Cyanocobalamin (B-12 PO) Take by mouth daily      cetirizine (ZYRTEC) 10 MG tablet Take 10 mg by mouth daily       acetaminophen (TYLENOL) 500 MG tablet Take 1,000 mg by mouth daily as needed        No current facility-administered medications on file prior to visit. Review of Systems   Constitutional:  Positive for weight gain. Negative for diaphoresis, fatigue and weight loss. HENT:  Negative for congestion. Respiratory:  Negative for cough, shortness of breath and wheezing. Cardiovascular:  Positive for dyspnea on exertion. Negative for palpitations. Gastrointestinal:  Positive for heartburn. Negative for constipation and diarrhea. Endocrine: Negative for cold intolerance. Genitourinary:  Negative for difficulty urinating. Musculoskeletal:  Positive for arthralgias (fingers). Negative for back pain. Neurological:  Negative for dizziness and light-headedness. Psychiatric/Behavioral:  Negative for dysphoric mood and sleep disturbance. The patient is not nervous/anxious. OBJECTIVE:    /80   Temp 98 °F (36.7 °C)   Wt 192 lb (87.1 kg)   BMI 36.28 kg/m²    Physical Exam  Constitutional:       General: She is not in acute distress. Appearance: She is obese. HENT:      Head: Normocephalic and atraumatic. Right Ear: Tympanic membrane normal.      Left Ear: Tympanic membrane normal.      Nose: Nose normal. No rhinorrhea. Mouth/Throat:      Mouth: Mucous membranes are moist.      Pharynx: No posterior oropharyngeal erythema. Cardiovascular:      Rate and Rhythm: Normal rate and regular rhythm. Pulses: Normal pulses. Heart sounds: Normal heart sounds. Pulmonary:      Effort: Pulmonary effort is normal.      Breath sounds: Normal breath sounds. Musculoskeletal:      Cervical back: Neck supple. Right lower leg: No edema. Left lower leg: No edema. Lymphadenopathy:      Cervical: No cervical adenopathy. Neurological:      General: No focal deficit present. Mental Status: She is alert. Psychiatric:         Mood and Affect: Mood normal.       ASSESSMENT/PLAN:    Janeth Mehta was seen today for follow-up. Diagnoses and all orders for this visit:    Need for prophylactic vaccination and inoculation against influenza  -     Influenza, FLUAD, (age 72 y+), IM, PF, 0.5 mL    Gastroesophageal reflux disease without esophagitis  Symptoms are controlled if she takes her medication  -     omeprazole (PRILOSEC) 20 MG delayed release capsule; TAKE 1 CAPSULE BY MOUTH EVERY MORNING BEFORE BREAKFAST    Acquired hypothyroidism  Stable on current dose with therapeutic TSH of 1.78.     Mixed simple and mucopurulent chronic bronchitis ([address]  Still with occasional productive cough    Multifocal atrial tachycardia ([address]  Well-controlled and asymptomatic    Essential hypertension  Well-controlled on current medications    Pure hypercholesterolemia  Patient ran out of her atorvastatin and her LDL is 113 and not at goal.  We will start her back on her medications and recheck in 6 months  -     atorvastatin (LIPITOR) 40 MG tablet; Take 1 tablet by mouth daily  -     Comprehensive Metabolic Panel, Fasting; Future  -     CBC with Auto Differential; Future  -     Lipid, Fasting; Future    Hyperglycemia  -     Hemoglobin A1C; Future      Return in about 6 months (around 8/2/2023). Please note portions of this note were completed with a voicerecognition program.  Efforts were made to edit the dictations but occasionally words are mis-transcribed.

## 2023-03-06 DIAGNOSIS — I10 ESSENTIAL HYPERTENSION: ICD-10-CM

## 2023-03-06 DIAGNOSIS — E03.9 ACQUIRED HYPOTHYROIDISM: ICD-10-CM

## 2023-03-06 RX ORDER — LEVOTHYROXINE SODIUM 0.1 MG/1
TABLET ORAL
Qty: 90 TABLET | Refills: 1 | Status: SHIPPED | OUTPATIENT
Start: 2023-03-06

## 2023-03-06 RX ORDER — VALSARTAN AND HYDROCHLOROTHIAZIDE 80; 12.5 MG/1; MG/1
1 TABLET, FILM COATED ORAL DAILY
Qty: 90 TABLET | Refills: 0 | Status: SHIPPED | OUTPATIENT
Start: 2023-03-06

## 2023-07-18 DIAGNOSIS — I10 ESSENTIAL HYPERTENSION: ICD-10-CM

## 2023-07-19 RX ORDER — VALSARTAN AND HYDROCHLOROTHIAZIDE 80; 12.5 MG/1; MG/1
1 TABLET, FILM COATED ORAL DAILY
Qty: 90 TABLET | Refills: 0 | Status: SHIPPED | OUTPATIENT
Start: 2023-07-19

## 2023-07-27 DIAGNOSIS — E78.00 PURE HYPERCHOLESTEROLEMIA: ICD-10-CM

## 2023-07-27 DIAGNOSIS — R73.9 HYPERGLYCEMIA: ICD-10-CM

## 2023-07-27 LAB
ALBUMIN SERPL-MCNC: 4.3 G/DL (ref 3.4–5)
ALBUMIN/GLOB SERPL: 1.5 {RATIO} (ref 1.1–2.2)
ALP SERPL-CCNC: 104 U/L (ref 40–129)
ALT SERPL-CCNC: 7 U/L (ref 10–40)
ANION GAP SERPL CALCULATED.3IONS-SCNC: 10 MMOL/L (ref 3–16)
AST SERPL-CCNC: 17 U/L (ref 15–37)
BASOPHILS # BLD: 0 K/UL (ref 0–0.2)
BASOPHILS NFR BLD: 0.2 %
BILIRUB SERPL-MCNC: 1.2 MG/DL (ref 0–1)
BUN SERPL-MCNC: 14 MG/DL (ref 7–20)
CALCIUM SERPL-MCNC: 9.4 MG/DL (ref 8.3–10.6)
CHLORIDE SERPL-SCNC: 101 MMOL/L (ref 99–110)
CHOLEST SERPL-MCNC: 138 MG/DL (ref 0–199)
CO2 SERPL-SCNC: 28 MMOL/L (ref 21–32)
CREAT SERPL-MCNC: 0.7 MG/DL (ref 0.6–1.2)
DEPRECATED RDW RBC AUTO: 13.8 % (ref 12.4–15.4)
EOSINOPHIL # BLD: 0.1 K/UL (ref 0–0.6)
EOSINOPHIL NFR BLD: 2.1 %
GFR SERPLBLD CREATININE-BSD FMLA CKD-EPI: >60 ML/MIN/{1.73_M2}
GLUCOSE P FAST SERPL-MCNC: 116 MG/DL (ref 70–99)
HCT VFR BLD AUTO: 43 % (ref 36–48)
HDLC SERPL-MCNC: 45 MG/DL (ref 40–60)
HGB BLD-MCNC: 14.5 G/DL (ref 12–16)
LDL CHOLESTEROL CALCULATED: 74 MG/DL
LYMPHOCYTES # BLD: 1.9 K/UL (ref 1–5.1)
LYMPHOCYTES NFR BLD: 27.8 %
MCH RBC QN AUTO: 28.7 PG (ref 26–34)
MCHC RBC AUTO-ENTMCNC: 33.7 G/DL (ref 31–36)
MCV RBC AUTO: 85.3 FL (ref 80–100)
MONOCYTES # BLD: 0.6 K/UL (ref 0–1.3)
MONOCYTES NFR BLD: 8.7 %
NEUTROPHILS # BLD: 4.3 K/UL (ref 1.7–7.7)
NEUTROPHILS NFR BLD: 61.2 %
PLATELET # BLD AUTO: 260 K/UL (ref 135–450)
PMV BLD AUTO: 8.6 FL (ref 5–10.5)
POTASSIUM SERPL-SCNC: 4.3 MMOL/L (ref 3.5–5.1)
PROT SERPL-MCNC: 7.2 G/DL (ref 6.4–8.2)
RBC # BLD AUTO: 5.04 M/UL (ref 4–5.2)
SODIUM SERPL-SCNC: 139 MMOL/L (ref 136–145)
TRIGL SERPL-MCNC: 95 MG/DL (ref 0–150)
TSH SERPL DL<=0.005 MIU/L-ACNC: 0.7 UIU/ML (ref 0.27–4.2)
VLDLC SERPL CALC-MCNC: 19 MG/DL
WBC # BLD AUTO: 6.9 K/UL (ref 4–11)

## 2023-07-28 LAB
EST. AVERAGE GLUCOSE BLD GHB EST-MCNC: 122.6 MG/DL
HBA1C MFR BLD: 5.9 %

## 2023-08-03 ENCOUNTER — OFFICE VISIT (OUTPATIENT)
Dept: FAMILY MEDICINE CLINIC | Age: 83
End: 2023-08-03

## 2023-08-03 VITALS
RESPIRATION RATE: 20 BRPM | TEMPERATURE: 98.8 F | OXYGEN SATURATION: 97 % | WEIGHT: 200.8 LBS | SYSTOLIC BLOOD PRESSURE: 124 MMHG | HEART RATE: 79 BPM | DIASTOLIC BLOOD PRESSURE: 78 MMHG | BODY MASS INDEX: 37.94 KG/M2

## 2023-08-03 DIAGNOSIS — M79.604 PAIN IN BOTH LOWER EXTREMITIES: ICD-10-CM

## 2023-08-03 DIAGNOSIS — I25.10 NON-OCCLUSIVE CORONARY ARTERY DISEASE: Primary | ICD-10-CM

## 2023-08-03 DIAGNOSIS — I10 ESSENTIAL HYPERTENSION: ICD-10-CM

## 2023-08-03 DIAGNOSIS — M79.605 PAIN IN BOTH LOWER EXTREMITIES: ICD-10-CM

## 2023-08-03 DIAGNOSIS — G60.9 IDIOPATHIC PERIPHERAL NEUROPATHY: ICD-10-CM

## 2023-08-03 DIAGNOSIS — R73.9 HYPERGLYCEMIA: ICD-10-CM

## 2023-08-03 DIAGNOSIS — E78.00 PURE HYPERCHOLESTEROLEMIA: ICD-10-CM

## 2023-08-03 DIAGNOSIS — E03.9 ACQUIRED HYPOTHYROIDISM: ICD-10-CM

## 2023-08-03 DIAGNOSIS — K21.9 GASTROESOPHAGEAL REFLUX DISEASE WITHOUT ESOPHAGITIS: ICD-10-CM

## 2023-08-03 ASSESSMENT — ENCOUNTER SYMPTOMS
DIARRHEA: 0
SHORTNESS OF BREATH: 1
BACK PAIN: 1
COUGH: 1
CONSTIPATION: 0
CHEST TIGHTNESS: 0
RHINORRHEA: 0

## 2023-08-03 ASSESSMENT — COPD QUESTIONNAIRES: COPD: 1

## 2023-08-04 LAB — CK SERPL-CCNC: 60 U/L (ref 26–192)

## 2023-08-05 LAB — MYOGLOBIN SERPL-MCNC: 33 NG/ML

## 2023-09-05 DIAGNOSIS — E03.9 ACQUIRED HYPOTHYROIDISM: ICD-10-CM

## 2023-09-05 DIAGNOSIS — K21.9 GASTROESOPHAGEAL REFLUX DISEASE WITHOUT ESOPHAGITIS: ICD-10-CM

## 2023-09-05 RX ORDER — LEVOTHYROXINE SODIUM 0.1 MG/1
100 TABLET ORAL DAILY
Qty: 90 TABLET | Refills: 0 | Status: SHIPPED | OUTPATIENT
Start: 2023-09-05

## 2023-09-05 RX ORDER — OMEPRAZOLE 20 MG/1
CAPSULE, DELAYED RELEASE ORAL
Qty: 90 CAPSULE | Refills: 0 | Status: SHIPPED | OUTPATIENT
Start: 2023-09-05

## 2023-09-08 ENCOUNTER — OFFICE VISIT (OUTPATIENT)
Dept: FAMILY MEDICINE CLINIC | Age: 83
End: 2023-09-08

## 2023-09-08 VITALS
RESPIRATION RATE: 16 BRPM | HEART RATE: 74 BPM | WEIGHT: 199 LBS | DIASTOLIC BLOOD PRESSURE: 82 MMHG | TEMPERATURE: 98.1 F | BODY MASS INDEX: 37.6 KG/M2 | OXYGEN SATURATION: 96 % | SYSTOLIC BLOOD PRESSURE: 122 MMHG

## 2023-09-08 DIAGNOSIS — G60.9 IDIOPATHIC PERIPHERAL NEUROPATHY: ICD-10-CM

## 2023-09-08 DIAGNOSIS — I10 ESSENTIAL HYPERTENSION: Primary | ICD-10-CM

## 2023-09-08 ASSESSMENT — ENCOUNTER SYMPTOMS
BACK PAIN: 1
CONSTIPATION: 0
DIARRHEA: 0
SINUS PAIN: 0
ABDOMINAL PAIN: 1
SINUS PRESSURE: 0
RHINORRHEA: 0

## 2023-09-08 NOTE — PROGRESS NOTES
SUBJECTIVE:    Dianna Jimenez is a 82 y.o. female who presents for a follow up visit. Chief Complaint   Patient presents with    Follow-up     Bilateral leg pain. Completed labs. Leg Pain   Incident onset: chronic. There was no injury mechanism. The pain is present in the left leg, right leg, left knee, left ankle, left heel, right thigh, right knee, right ankle and right heel. The quality of the pain is described as aching. The pain is moderate. The pain has been Constant since onset. Associated symptoms include tingling (in heels). Pertinent negatives include no muscle weakness or numbness. She has tried rest for the symptoms. The treatment provided mild relief. Patient's medications, allergies, past medical,surgical, social and family histories were reviewed and updated as appropriate.      Past Medical History:   Diagnosis Date    Arthritis     Asthma     Atrial fibrillation ([address])     past history    COPD (chronic obstructive pulmonary disease) ([address])     Diabetes mellitus ([address])     no meds    Hyperlipidemia     Hypertension     Influenza 2018    Thyroid disease      Past Surgical History:   Procedure Laterality Date    COLONOSCOPY      ENDOSCOPY, COLON, DIAGNOSTIC      HIATAL HERNIA REPAIR N/A 10/8/2021    LAPAROSCOPIC HIATAL HERNIA REPAIR, NISSEN FUNDOPLICATION performed by Karen Steen MD at [facility] N/A 2021    EGD DIAGNOSTIC ONLY performed by Ronny Petty MD at [facility]     Family History   Problem Relation Age of Onset    Heart Disease Mother     High Blood Pressure Mother     Stroke Mother     Heart Disease Sister     High Blood Pressure Sister     Cancer Brother         lung     Social History     Tobacco Use    Smoking status: Former     Packs/day: 1.00     Years: 10.00     Pack years: 10.00     Types: Cigarettes     Quit date: 10/5/1998     Years since quittin.9    Smokeless tobacco: Never    Tobacco comments:     was

## 2023-10-16 DIAGNOSIS — I10 ESSENTIAL HYPERTENSION: ICD-10-CM

## 2023-10-17 RX ORDER — VALSARTAN AND HYDROCHLOROTHIAZIDE 80; 12.5 MG/1; MG/1
1 TABLET, FILM COATED ORAL DAILY
Qty: 90 TABLET | Refills: 0 | Status: SHIPPED | OUTPATIENT
Start: 2023-10-17

## 2023-12-13 DIAGNOSIS — E03.9 ACQUIRED HYPOTHYROIDISM: ICD-10-CM

## 2023-12-13 RX ORDER — LEVOTHYROXINE SODIUM 0.1 MG/1
100 TABLET ORAL DAILY
Qty: 90 TABLET | Refills: 0 | Status: SHIPPED | OUTPATIENT
Start: 2023-12-13

## 2023-12-27 DIAGNOSIS — E03.9 ACQUIRED HYPOTHYROIDISM: ICD-10-CM

## 2023-12-27 DIAGNOSIS — E78.00 PURE HYPERCHOLESTEROLEMIA: ICD-10-CM

## 2023-12-27 DIAGNOSIS — K21.9 GASTROESOPHAGEAL REFLUX DISEASE WITHOUT ESOPHAGITIS: ICD-10-CM

## 2023-12-27 RX ORDER — OMEPRAZOLE 20 MG/1
CAPSULE, DELAYED RELEASE ORAL
Qty: 90 CAPSULE | Refills: 0 | Status: SHIPPED | OUTPATIENT
Start: 2023-12-27

## 2023-12-27 RX ORDER — LEVOTHYROXINE SODIUM 0.1 MG/1
100 TABLET ORAL DAILY
Qty: 90 TABLET | Refills: 0 | Status: SHIPPED | OUTPATIENT
Start: 2023-12-27

## 2023-12-27 RX ORDER — ATORVASTATIN CALCIUM 40 MG/1
40 TABLET, FILM COATED ORAL DAILY
Qty: 90 TABLET | Refills: 3 | Status: SHIPPED | OUTPATIENT
Start: 2023-12-27

## 2024-01-12 DIAGNOSIS — K21.9 GASTROESOPHAGEAL REFLUX DISEASE WITHOUT ESOPHAGITIS: ICD-10-CM

## 2024-01-12 DIAGNOSIS — I10 ESSENTIAL HYPERTENSION: ICD-10-CM

## 2024-01-12 RX ORDER — VALSARTAN AND HYDROCHLOROTHIAZIDE 80; 12.5 MG/1; MG/1
1 TABLET, FILM COATED ORAL DAILY
Qty: 90 TABLET | Refills: 0 | Status: SHIPPED | OUTPATIENT
Start: 2024-01-12

## 2024-01-12 RX ORDER — OMEPRAZOLE 20 MG/1
CAPSULE, DELAYED RELEASE ORAL
Qty: 90 CAPSULE | Refills: 0 | Status: SHIPPED | OUTPATIENT
Start: 2024-01-12

## 2024-01-12 NOTE — TELEPHONE ENCOUNTER
Medication:   Requested Prescriptions     Pending Prescriptions Disp Refills    omeprazole (PRILOSEC) 20 MG delayed release capsule [Pharmacy Med Name: OMEPRAZOLE 20MG CAPSULES] 90 capsule 0     Sig: TAKE 1 CAPSULE BY MOUTH EVERY MORNING BEFORE BREAKFAST    valsartan-hydroCHLOROthiazide (DIOVAN-HCT) 80-12.5 MG per tablet [Pharmacy Med Name: VALSARTAN/HCTZ 80MG/12.5MG TABLETS] 90 tablet 0     Sig: TAKE 1 TABLET BY MOUTH DAILY          Patient Phone Number: [phone] (home)     Last appt: 9/8/2023   Next appt: 2/6/2024    Last OARRS:       12/21/2016     1:06 PM   RX Monitoring   Attestation The Prescription Monitoring Report for this patient was reviewed today.   Periodic Controlled Substance Monitoring Possible medication side effects, risk of tolerance and/or dependence, and alternative treatments discussed;No signs of potential drug abuse or diversion identified.     PDMP Monitoring:    Last PDMP Red as Reviewed ([place]):  Review User Review Instant Review Result          Preferred Pharmacy:   [id number] [place], [place] - [address] - P [phone] - F [phone]  [address] [facility] [address]  Phone: [phone] Fax: [phone]    [id number] - [place], [place] - [address]  - P [phone] - F [phone]  [address] [facility] [address]  Phone: [phone] Fax: [phone]    [facility] [id number] [place], [place] - [address] [phone] - F [phone]  [address]  [facility] [address]  Phone: [phone] Fax: [phone]

## 2024-01-29 DIAGNOSIS — R73.9 HYPERGLYCEMIA: ICD-10-CM

## 2024-01-29 DIAGNOSIS — E78.00 PURE HYPERCHOLESTEROLEMIA: ICD-10-CM

## 2024-01-29 LAB
ALBUMIN SERPL-MCNC: 4.2 G/DL (ref 3.4–5)
ALBUMIN/GLOB SERPL: 1.3 {RATIO} (ref 1.1–2.2)
ALP SERPL-CCNC: 116 U/L (ref 40–129)
ALT SERPL-CCNC: 8 U/L (ref 10–40)
ANION GAP SERPL CALCULATED.3IONS-SCNC: 13 MMOL/L (ref 3–16)
AST SERPL-CCNC: 18 U/L (ref 15–37)
BASOPHILS # BLD: 0 K/UL (ref 0–0.2)
BASOPHILS NFR BLD: 0.3 %
BILIRUB SERPL-MCNC: 0.9 MG/DL (ref 0–1)
BUN SERPL-MCNC: 17 MG/DL (ref 7–20)
CALCIUM SERPL-MCNC: 9.4 MG/DL (ref 8.3–10.6)
CHLORIDE SERPL-SCNC: 101 MMOL/L (ref 99–110)
CHOLEST SERPL-MCNC: 148 MG/DL (ref 0–199)
CO2 SERPL-SCNC: 29 MMOL/L (ref 21–32)
CREAT SERPL-MCNC: 0.7 MG/DL (ref 0.6–1.2)
DEPRECATED RDW RBC AUTO: 14.1 % (ref 12.4–15.4)
EOSINOPHIL # BLD: 0.1 K/UL (ref 0–0.6)
EOSINOPHIL NFR BLD: 1.3 %
GFR SERPLBLD CREATININE-BSD FMLA CKD-EPI: >60 ML/MIN/{1.73_M2}
GLUCOSE P FAST SERPL-MCNC: 111 MG/DL (ref 70–99)
HCT VFR BLD AUTO: 44.5 % (ref 36–48)
HDLC SERPL-MCNC: 50 MG/DL (ref 40–60)
HGB BLD-MCNC: 14.9 G/DL (ref 12–16)
LDL CHOLESTEROL CALCULATED: 83 MG/DL
LYMPHOCYTES # BLD: 1.8 K/UL (ref 1–5.1)
LYMPHOCYTES NFR BLD: 23.9 %
MCH RBC QN AUTO: 28.9 PG (ref 26–34)
MCHC RBC AUTO-ENTMCNC: 33.4 G/DL (ref 31–36)
MCV RBC AUTO: 86.4 FL (ref 80–100)
MONOCYTES # BLD: 0.6 K/UL (ref 0–1.3)
MONOCYTES NFR BLD: 8.1 %
NEUTROPHILS # BLD: 4.9 K/UL (ref 1.7–7.7)
NEUTROPHILS NFR BLD: 66.4 %
PLATELET # BLD AUTO: 274 K/UL (ref 135–450)
PMV BLD AUTO: 8.5 FL (ref 5–10.5)
POTASSIUM SERPL-SCNC: 4.2 MMOL/L (ref 3.5–5.1)
PROT SERPL-MCNC: 7.4 G/DL (ref 6.4–8.2)
RBC # BLD AUTO: 5.15 M/UL (ref 4–5.2)
SODIUM SERPL-SCNC: 143 MMOL/L (ref 136–145)
TRIGL SERPL-MCNC: 77 MG/DL (ref 0–150)
VLDLC SERPL CALC-MCNC: 15 MG/DL
WBC # BLD AUTO: 7.4 K/UL (ref 4–11)

## 2024-01-30 LAB
EST. AVERAGE GLUCOSE BLD GHB EST-MCNC: 122.6 MG/DL
HBA1C MFR BLD: 5.9 %

## 2024-02-04 NOTE — PROGRESS NOTES
nasal spray 2 sprays by Nasal route daily as needed for Allergies (as needed) Yes Jb Tuttle Jr., MD   Cyanocobalamin (B-12 PO) Take by mouth daily Yes ProviderVasile MD   cetirizine (ZYRTEC) 10 MG tablet Take 1 tablet by mouth daily Yes ProviderVasile MD   acetaminophen (TYLENOL) 500 MG tablet Take 2 tablets by mouth daily as needed Yes ProviderVasile MD       CareTeam (Including outside providers/suppliers regularly involved in providing care):   Patient Care Team:  Jb Tuttle Jr., MD as PCP - General (Family Medicine)  Jb Tuttle Jr., MD as PCP - Empaneled Provider  Unruly Palmer MD as Surgeon (General Surgery)     Reviewed and updated this visit:  Tobacco  Allergies  Meds  Med Hx  Surg Hx  Soc Hx  Fam Hx

## 2024-02-06 ENCOUNTER — OFFICE VISIT (OUTPATIENT)
Dept: FAMILY MEDICINE CLINIC | Age: 84
End: 2024-02-06
Payer: MEDICARE

## 2024-02-06 VITALS
TEMPERATURE: 97.2 F | RESPIRATION RATE: 20 BRPM | WEIGHT: 200.8 LBS | OXYGEN SATURATION: 95 % | HEART RATE: 83 BPM | HEIGHT: 61 IN | SYSTOLIC BLOOD PRESSURE: 110 MMHG | DIASTOLIC BLOOD PRESSURE: 68 MMHG | BODY MASS INDEX: 37.91 KG/M2

## 2024-02-06 DIAGNOSIS — Z00.00 MEDICARE ANNUAL WELLNESS VISIT, SUBSEQUENT: ICD-10-CM

## 2024-02-06 DIAGNOSIS — J41.8 MIXED SIMPLE AND MUCOPURULENT CHRONIC BRONCHITIS (HCC): ICD-10-CM

## 2024-02-06 DIAGNOSIS — K21.9 GASTROESOPHAGEAL REFLUX DISEASE WITHOUT ESOPHAGITIS: ICD-10-CM

## 2024-02-06 DIAGNOSIS — I10 ESSENTIAL HYPERTENSION: Primary | ICD-10-CM

## 2024-02-06 DIAGNOSIS — E66.01 SEVERE OBESITY (BMI 35.0-39.9) WITH COMORBIDITY (HCC): ICD-10-CM

## 2024-02-06 DIAGNOSIS — J45.40 MODERATE PERSISTENT ASTHMA WITHOUT COMPLICATION: ICD-10-CM

## 2024-02-06 DIAGNOSIS — E78.00 PURE HYPERCHOLESTEROLEMIA: ICD-10-CM

## 2024-02-06 DIAGNOSIS — R73.9 HYPERGLYCEMIA: ICD-10-CM

## 2024-02-06 DIAGNOSIS — E03.9 ACQUIRED HYPOTHYROIDISM: ICD-10-CM

## 2024-02-06 PROCEDURE — G8427 DOCREV CUR MEDS BY ELIG CLIN: HCPCS | Performed by: FAMILY MEDICINE

## 2024-02-06 PROCEDURE — 1124F ACP DISCUSS-NO DSCNMKR DOCD: CPT | Performed by: FAMILY MEDICINE

## 2024-02-06 PROCEDURE — 3078F DIAST BP <80 MM HG: CPT | Performed by: FAMILY MEDICINE

## 2024-02-06 PROCEDURE — 3074F SYST BP LT 130 MM HG: CPT | Performed by: FAMILY MEDICINE

## 2024-02-06 PROCEDURE — G8484 FLU IMMUNIZE NO ADMIN: HCPCS | Performed by: FAMILY MEDICINE

## 2024-02-06 PROCEDURE — 1036F TOBACCO NON-USER: CPT | Performed by: FAMILY MEDICINE

## 2024-02-06 PROCEDURE — G0439 PPPS, SUBSEQ VISIT: HCPCS | Performed by: FAMILY MEDICINE

## 2024-02-06 PROCEDURE — 1090F PRES/ABSN URINE INCON ASSESS: CPT | Performed by: FAMILY MEDICINE

## 2024-02-06 PROCEDURE — 99214 OFFICE O/P EST MOD 30 MIN: CPT | Performed by: FAMILY MEDICINE

## 2024-02-06 PROCEDURE — 3023F SPIROM DOC REV: CPT | Performed by: FAMILY MEDICINE

## 2024-02-06 PROCEDURE — G8399 PT W/DXA RESULTS DOCUMENT: HCPCS | Performed by: FAMILY MEDICINE

## 2024-02-06 PROCEDURE — G8417 CALC BMI ABV UP PARAM F/U: HCPCS | Performed by: FAMILY MEDICINE

## 2024-02-06 RX ORDER — ALBUTEROL SULFATE 90 UG/1
2 INHALANT RESPIRATORY (INHALATION) EVERY 6 HOURS PRN
Qty: 1 EACH | Refills: 5 | Status: SHIPPED | OUTPATIENT
Start: 2024-02-06

## 2024-02-06 SDOH — ECONOMIC STABILITY: FOOD INSECURITY: WITHIN THE PAST 12 MONTHS, YOU WORRIED THAT YOUR FOOD WOULD RUN OUT BEFORE YOU GOT MONEY TO BUY MORE.: NEVER TRUE

## 2024-02-06 SDOH — ECONOMIC STABILITY: INCOME INSECURITY: HOW HARD IS IT FOR YOU TO PAY FOR THE VERY BASICS LIKE FOOD, HOUSING, MEDICAL CARE, AND HEATING?: NOT HARD AT ALL

## 2024-02-06 SDOH — ECONOMIC STABILITY: FOOD INSECURITY: WITHIN THE PAST 12 MONTHS, THE FOOD YOU BOUGHT JUST DIDN'T LAST AND YOU DIDN'T HAVE MONEY TO GET MORE.: NEVER TRUE

## 2024-02-06 ASSESSMENT — PATIENT HEALTH QUESTIONNAIRE - PHQ9
SUM OF ALL RESPONSES TO PHQ QUESTIONS 1-9: 0
SUM OF ALL RESPONSES TO PHQ QUESTIONS 1-9: 0
SUM OF ALL RESPONSES TO PHQ9 QUESTIONS 1 & 2: 0
SUM OF ALL RESPONSES TO PHQ QUESTIONS 1-9: 0
2. FEELING DOWN, DEPRESSED OR HOPELESS: 0
SUM OF ALL RESPONSES TO PHQ QUESTIONS 1-9: 0
1. LITTLE INTEREST OR PLEASURE IN DOING THINGS: 0

## 2024-02-06 ASSESSMENT — ENCOUNTER SYMPTOMS
CONSTIPATION: 0
DIARRHEA: 0

## 2024-02-06 ASSESSMENT — LIFESTYLE VARIABLES
HOW OFTEN DO YOU HAVE A DRINK CONTAINING ALCOHOL: NEVER
HOW MANY STANDARD DRINKS CONTAINING ALCOHOL DO YOU HAVE ON A TYPICAL DAY: PATIENT DOES NOT DRINK

## 2024-03-26 DIAGNOSIS — I10 ESSENTIAL HYPERTENSION: ICD-10-CM

## 2024-03-26 RX ORDER — VALSARTAN AND HYDROCHLOROTHIAZIDE 80; 12.5 MG/1; MG/1
1 TABLET, FILM COATED ORAL DAILY
Qty: 90 TABLET | Refills: 0 | Status: SHIPPED | OUTPATIENT
Start: 2024-03-26

## 2024-05-28 NOTE — PROGRESS NOTES
12:55 PM  Admission and assessment complete. Meds given for pain. Belly is soft and lap sites intact. Patient awake and able to answer questions appropriately. Fine crackles noted at the base of lungs. Surgery aware. Sats 92% on 2 liters. Patient denies shortness of breath. Patient given IS and instructed on use. Patient demonstrated understanding. Patient HR controlled and sounds irregular. Patient was normal sinus rhythm with PACs in recovery. Spoke with patient and family about plan of care. The care plan and education has been reviewed and mutually agreed upon with the patient. 3:28 PM  Assisted patient up to the bathroom. Ambulated with patient a half lap in the hallway. Now back in bed with bed alarm on and call light in reach. Patient denies any other needs at this time. 7:37 PM  Patient assisted up to the bathroom and back to bed. Bed alar on and call light in reach. Patient medicated for pain. Patient called stating she has an infected tooth and wanted to know if it was okay to take an antibiotic as she is scheduled to have sx the 28th.

## 2024-06-11 DIAGNOSIS — E03.9 ACQUIRED HYPOTHYROIDISM: ICD-10-CM

## 2024-06-11 RX ORDER — LEVOTHYROXINE SODIUM 0.1 MG/1
100 TABLET ORAL DAILY
Qty: 90 TABLET | Refills: 0 | Status: SHIPPED | OUTPATIENT
Start: 2024-06-11

## 2024-06-24 DIAGNOSIS — E03.9 ACQUIRED HYPOTHYROIDISM: ICD-10-CM

## 2024-06-24 DIAGNOSIS — I10 ESSENTIAL HYPERTENSION: ICD-10-CM

## 2024-06-24 RX ORDER — LEVOTHYROXINE SODIUM 0.1 MG/1
100 TABLET ORAL DAILY
Qty: 90 TABLET | Refills: 0 | Status: SHIPPED | OUTPATIENT
Start: 2024-06-24

## 2024-06-24 RX ORDER — VALSARTAN AND HYDROCHLOROTHIAZIDE 80; 12.5 MG/1; MG/1
1 TABLET, FILM COATED ORAL DAILY
Qty: 90 TABLET | Refills: 0 | Status: SHIPPED | OUTPATIENT
Start: 2024-06-24

## 2024-07-11 DIAGNOSIS — I10 ESSENTIAL HYPERTENSION: ICD-10-CM

## 2024-07-11 DIAGNOSIS — E78.00 PURE HYPERCHOLESTEROLEMIA: ICD-10-CM

## 2024-07-11 RX ORDER — ATORVASTATIN CALCIUM 40 MG/1
40 TABLET, FILM COATED ORAL DAILY
Qty: 90 TABLET | Refills: 3 | Status: SHIPPED | OUTPATIENT
Start: 2024-07-11

## 2024-07-11 RX ORDER — VALSARTAN AND HYDROCHLOROTHIAZIDE 80; 12.5 MG/1; MG/1
1 TABLET, FILM COATED ORAL DAILY
Qty: 90 TABLET | Refills: 0 | Status: SHIPPED | OUTPATIENT
Start: 2024-07-11

## 2024-07-11 NOTE — TELEPHONE ENCOUNTER
Medication:   Requested Prescriptions     Pending Prescriptions Disp Refills    atorvastatin (LIPITOR) 40 MG tablet [Pharmacy Med Name: ATORVASTATIN 40MG TABLETS] 90 tablet 3     Sig: TAKE 1 TABLET BY MOUTH DAILY    valsartan-hydroCHLOROthiazide (DIOVAN-HCT) 80-12.5 MG per tablet [Pharmacy Med Name: VALSARTAN/HCTZ 80MG/12.5MG TABLETS] 90 tablet 0     Sig: TAKE 1 TABLET BY MOUTH DAILY      Last Filled:  12/27/23 and 6/24/24    Patient Phone Number: [phone] (home)     Last appt: 2/6/2024   Next appt: 8/8/2024    Last OARRS:       12/21/2016     1:06 PM   RX Monitoring   Attestation The Prescription Monitoring Report for this patient was reviewed today.   Periodic Controlled Substance Monitoring Possible medication side effects, risk of tolerance and/or dependence, and alternative treatments discussed;No signs of potential drug abuse or diversion identified.     PDMP Monitoring:    Last PDMP Red as Reviewed ([place]):  Review User Review Instant Review Result          Preferred Pharmacy:   [id number] [place], [place] - [address] -  [phone] - F [phone] [address] [facility] [address]  Phone: [phone] Fax: [phone]    Recent Visits  Date Type Provider Dept   02/06/24 Office Visit Jb Tuttle Jr., MD [facility]   09/08/23 Office Visit Jb Tuttle Jr., MD [facility]   08/03/23 Office Visit Jb Tuttle Jr., MD [facility]   02/02/23 Office Visit Jb Tuttle Jr., MD [facility]   Showing recent visits within past 540 days with a meds authorizing provider and meeting all other requirements  Future Appointments  Date Type Provider Dept   08/08/24 Appointment Jb Tuttle Jr., MD [facility]   Showing future appointments within next 150 days with a meds authorizing provider and meeting all other requirements     2/6/2024

## 2024-07-31 DIAGNOSIS — E78.00 PURE HYPERCHOLESTEROLEMIA: ICD-10-CM

## 2024-07-31 DIAGNOSIS — R73.9 HYPERGLYCEMIA: ICD-10-CM

## 2024-07-31 DIAGNOSIS — E03.9 ACQUIRED HYPOTHYROIDISM: ICD-10-CM

## 2024-07-31 LAB
ALBUMIN SERPL-MCNC: 4 G/DL (ref 3.4–5)
ALBUMIN/GLOB SERPL: 1.3 {RATIO} (ref 1.1–2.2)
ALP SERPL-CCNC: 107 U/L (ref 40–129)
ALT SERPL-CCNC: 7 U/L (ref 10–40)
ANION GAP SERPL CALCULATED.3IONS-SCNC: 11 MMOL/L (ref 3–16)
AST SERPL-CCNC: 19 U/L (ref 15–37)
BASOPHILS # BLD: 0.3 K/UL (ref 0–0.2)
BASOPHILS NFR BLD: 3.8 %
BILIRUB SERPL-MCNC: 1.2 MG/DL (ref 0–1)
BUN SERPL-MCNC: 12 MG/DL (ref 7–20)
CALCIUM SERPL-MCNC: 9.6 MG/DL (ref 8.3–10.6)
CHLORIDE SERPL-SCNC: 99 MMOL/L (ref 99–110)
CHOLEST SERPL-MCNC: 133 MG/DL (ref 0–199)
CO2 SERPL-SCNC: 29 MMOL/L (ref 21–32)
CREAT SERPL-MCNC: 0.7 MG/DL (ref 0.6–1.2)
DEPRECATED RDW RBC AUTO: 14.4 % (ref 12.4–15.4)
EOSINOPHIL # BLD: 0.1 K/UL (ref 0–0.6)
EOSINOPHIL NFR BLD: 1.7 %
GFR SERPLBLD CREATININE-BSD FMLA CKD-EPI: 86 ML/MIN/{1.73_M2}
GLUCOSE P FAST SERPL-MCNC: 115 MG/DL (ref 70–99)
HCT VFR BLD AUTO: 43.7 % (ref 36–48)
HDLC SERPL-MCNC: 45 MG/DL (ref 40–60)
HGB BLD-MCNC: 14.7 G/DL (ref 12–16)
LDL CHOLESTEROL: 72 MG/DL
LYMPHOCYTES # BLD: 1.9 K/UL (ref 1–5.1)
LYMPHOCYTES NFR BLD: 25.5 %
MCH RBC QN AUTO: 28.9 PG (ref 26–34)
MCHC RBC AUTO-ENTMCNC: 33.6 G/DL (ref 31–36)
MCV RBC AUTO: 86 FL (ref 80–100)
MONOCYTES # BLD: 0.7 K/UL (ref 0–1.3)
MONOCYTES NFR BLD: 10 %
NEUTROPHILS # BLD: 4.4 K/UL (ref 1.7–7.7)
NEUTROPHILS NFR BLD: 59 %
PLATELET # BLD AUTO: 259 K/UL (ref 135–450)
PMV BLD AUTO: 8.9 FL (ref 5–10.5)
POTASSIUM SERPL-SCNC: 4.3 MMOL/L (ref 3.5–5.1)
PROT SERPL-MCNC: 7.1 G/DL (ref 6.4–8.2)
RBC # BLD AUTO: 5.08 M/UL (ref 4–5.2)
SODIUM SERPL-SCNC: 139 MMOL/L (ref 136–145)
TRIGL SERPL-MCNC: 78 MG/DL (ref 0–150)
TSH SERPL DL<=0.005 MIU/L-ACNC: 1.32 UIU/ML (ref 0.27–4.2)
VLDLC SERPL CALC-MCNC: 16 MG/DL
WBC # BLD AUTO: 7.5 K/UL (ref 4–11)

## 2024-08-01 LAB
EST. AVERAGE GLUCOSE BLD GHB EST-MCNC: 125.5 MG/DL
HBA1C MFR BLD: 6 %

## 2024-08-05 ASSESSMENT — ENCOUNTER SYMPTOMS
SHORTNESS OF BREATH: 1
HEARTBURN: 1

## 2024-08-05 ASSESSMENT — COPD QUESTIONNAIRES: COPD: 1

## 2024-08-05 NOTE — PROGRESS NOTES
SUBJECTIVE:    Mojgan Gresham is a 84 y.o. female who presents for a follow up visit.    Chief Complaint   Patient presents with    Follow-up     Left leg swelling since July        Hyperlipidemia  This is a chronic problem. The current episode started more than 1 year ago. Lipid results: Total cholesterol 133, triglycerides 78, HDL 45, LDL 72. Exacerbating diseases include obesity. Factors aggravating her hyperlipidemia include thiazides. Associated symptoms include shortness of breath. Pertinent negatives include no chest pain. Current antihyperlipidemic treatment includes statins. The current treatment provides significant improvement of lipids. Compliance problems include adherence to exercise and adherence to diet.  Risk factors for coronary artery disease include dyslipidemia, hypertension, obesity, a sedentary lifestyle and post-menopausal.   Hypertension  This is a chronic problem. The current episode started more than 1 year ago. The problem is controlled. Associated symptoms include shortness of breath. Pertinent negatives include no chest pain. Risk factors for coronary artery disease include dyslipidemia, obesity, sedentary lifestyle and post-menopausal state. Past treatments include angiotensin blockers and diuretics. The current treatment provides significant improvement. Compliance problems include exercise and diet.    Gastroesophageal Reflux  She complains of heartburn. She reports no chest pain. This is a chronic problem. The current episode started more than 1 year ago. The problem occurs rarely. The heartburn is located in the substernum. The heartburn is of mild intensity. The heartburn does not wake her from sleep. The heartburn does not limit her activity. The heartburn doesn't change with position. The symptoms are aggravated by certain foods. Risk factors include obesity and lack of exercise. She has tried a PPI for the symptoms. The treatment provided significant relief. Past procedures

## 2024-08-08 ENCOUNTER — OFFICE VISIT (OUTPATIENT)
Dept: FAMILY MEDICINE CLINIC | Age: 84
End: 2024-08-08

## 2024-08-08 VITALS
BODY MASS INDEX: 37.83 KG/M2 | OXYGEN SATURATION: 94 % | DIASTOLIC BLOOD PRESSURE: 72 MMHG | WEIGHT: 200.2 LBS | SYSTOLIC BLOOD PRESSURE: 112 MMHG | TEMPERATURE: 97.2 F | HEART RATE: 84 BPM

## 2024-08-08 DIAGNOSIS — K21.9 GASTROESOPHAGEAL REFLUX DISEASE WITHOUT ESOPHAGITIS: ICD-10-CM

## 2024-08-08 DIAGNOSIS — R73.9 HYPERGLYCEMIA: ICD-10-CM

## 2024-08-08 DIAGNOSIS — E03.9 ACQUIRED HYPOTHYROIDISM: ICD-10-CM

## 2024-08-08 DIAGNOSIS — I10 ESSENTIAL HYPERTENSION: ICD-10-CM

## 2024-08-08 DIAGNOSIS — J45.40 MODERATE PERSISTENT ASTHMA WITHOUT COMPLICATION: ICD-10-CM

## 2024-08-08 DIAGNOSIS — M25.562 ACUTE PAIN OF LEFT KNEE: Primary | ICD-10-CM

## 2024-08-08 DIAGNOSIS — J30.9 ALLERGIC RHINITIS, UNSPECIFIED SEASONALITY, UNSPECIFIED TRIGGER: ICD-10-CM

## 2024-08-08 DIAGNOSIS — E78.00 PURE HYPERCHOLESTEROLEMIA: ICD-10-CM

## 2024-08-08 PROBLEM — I47.19 MULTIFOCAL ATRIAL TACHYCARDIA (HCC): Status: RESOLVED | Noted: 2018-10-05 | Resolved: 2024-08-08

## 2024-08-08 RX ORDER — VALSARTAN AND HYDROCHLOROTHIAZIDE 80; 12.5 MG/1; MG/1
1 TABLET, FILM COATED ORAL DAILY
Qty: 90 TABLET | Refills: 3 | Status: SHIPPED | OUTPATIENT
Start: 2024-08-08

## 2024-08-08 RX ORDER — FLUTICASONE PROPIONATE 50 MCG
2 SPRAY, SUSPENSION (ML) NASAL DAILY PRN
Qty: 1 EACH | Refills: 5 | Status: SHIPPED | OUTPATIENT
Start: 2024-08-08

## 2024-08-08 RX ORDER — LEVOTHYROXINE SODIUM 0.1 MG/1
100 TABLET ORAL DAILY
Qty: 90 TABLET | Refills: 3 | Status: SHIPPED | OUTPATIENT
Start: 2024-08-08

## 2024-08-08 RX ORDER — ALBUTEROL SULFATE 90 UG/1
2 INHALANT RESPIRATORY (INHALATION) EVERY 6 HOURS PRN
Qty: 1 EACH | Refills: 5 | Status: SHIPPED | OUTPATIENT
Start: 2024-08-08

## 2024-08-08 RX ORDER — OMEPRAZOLE 20 MG/1
20 CAPSULE, DELAYED RELEASE ORAL
Qty: 90 CAPSULE | Refills: 3 | Status: SHIPPED | OUTPATIENT
Start: 2024-08-08

## 2024-08-08 ASSESSMENT — ENCOUNTER SYMPTOMS
EYE ITCHING: 0
DIARRHEA: 0
RHINORRHEA: 0
CHEST TIGHTNESS: 0
CONSTIPATION: 0

## 2024-09-09 DIAGNOSIS — E03.9 ACQUIRED HYPOTHYROIDISM: ICD-10-CM

## 2024-09-09 RX ORDER — LEVOTHYROXINE SODIUM 100 UG/1
100 TABLET ORAL DAILY
Qty: 90 TABLET | Refills: 3 | OUTPATIENT
Start: 2024-09-09

## 2024-10-10 ENCOUNTER — TELEPHONE (OUTPATIENT)
Dept: FAMILY MEDICINE CLINIC | Age: 84
End: 2024-10-10

## 2024-10-10 NOTE — TELEPHONE ENCOUNTER
Patient is requesting an order for Handicap Placard. Please give her a call when the order if ready for . [phone].     Please advise.

## 2024-10-14 ENCOUNTER — IMMUNIZATION (OUTPATIENT)
Dept: FAMILY MEDICINE CLINIC | Age: 84
End: 2024-10-14
Payer: MEDICARE

## 2024-10-14 DIAGNOSIS — Z23 NEED FOR INFLUENZA VACCINATION: Primary | ICD-10-CM

## 2024-10-14 PROCEDURE — G0008 ADMIN INFLUENZA VIRUS VAC: HCPCS | Performed by: FAMILY MEDICINE

## 2024-10-14 PROCEDURE — 90653 IIV ADJUVANT VACCINE IM: CPT | Performed by: FAMILY MEDICINE

## 2024-10-31 ENCOUNTER — TELEPHONE (OUTPATIENT)
Dept: ADMINISTRATIVE | Age: 84
End: 2024-10-31

## 2024-10-31 NOTE — TELEPHONE ENCOUNTER
Rec'd a request from pharmacy  to complete PA for Albuterol sulfate. We dont usually do PA's for Albuterol. Pharmacy would run and give the patient what the insurance covers. This medication is ordered as Albuterol sensor. Please verify if this is the medication needed or if regular albuterol is what is needed.     Send back to PA pool to let me know.     Thank you.

## 2025-02-04 DIAGNOSIS — R73.9 HYPERGLYCEMIA: ICD-10-CM

## 2025-02-04 DIAGNOSIS — E78.00 PURE HYPERCHOLESTEROLEMIA: ICD-10-CM

## 2025-02-04 LAB
ALBUMIN SERPL-MCNC: 4.2 G/DL (ref 3.4–5)
ALBUMIN/GLOB SERPL: 1.4 {RATIO} (ref 1.1–2.2)
ALP SERPL-CCNC: 105 U/L (ref 40–129)
ALT SERPL-CCNC: 8 U/L (ref 10–40)
ANION GAP SERPL CALCULATED.3IONS-SCNC: 8 MMOL/L (ref 3–16)
AST SERPL-CCNC: 24 U/L (ref 15–37)
BASOPHILS # BLD: 0 K/UL (ref 0–0.2)
BASOPHILS NFR BLD: 0.4 %
BILIRUB SERPL-MCNC: 1.5 MG/DL (ref 0–1)
BUN SERPL-MCNC: 12 MG/DL (ref 7–20)
CALCIUM SERPL-MCNC: 9.6 MG/DL (ref 8.3–10.6)
CHLORIDE SERPL-SCNC: 97 MMOL/L (ref 99–110)
CHOLEST SERPL-MCNC: 145 MG/DL (ref 0–199)
CO2 SERPL-SCNC: 32 MMOL/L (ref 21–32)
CREAT SERPL-MCNC: 0.7 MG/DL (ref 0.6–1.2)
DEPRECATED RDW RBC AUTO: 13.7 % (ref 12.4–15.4)
EOSINOPHIL # BLD: 0.1 K/UL (ref 0–0.6)
EOSINOPHIL NFR BLD: 0.9 %
EST. AVERAGE GLUCOSE BLD GHB EST-MCNC: 122.6 MG/DL
GFR SERPLBLD CREATININE-BSD FMLA CKD-EPI: 86 ML/MIN/{1.73_M2}
GLUCOSE P FAST SERPL-MCNC: 118 MG/DL (ref 70–99)
HBA1C MFR BLD: 5.9 %
HCT VFR BLD AUTO: 44.5 % (ref 36–48)
HDLC SERPL-MCNC: 44 MG/DL (ref 40–60)
HGB BLD-MCNC: 14.7 G/DL (ref 12–16)
LDL CHOLESTEROL: 85 MG/DL
LYMPHOCYTES # BLD: 1.7 K/UL (ref 1–5.1)
LYMPHOCYTES NFR BLD: 24 %
MCH RBC QN AUTO: 28.9 PG (ref 26–34)
MCHC RBC AUTO-ENTMCNC: 33 G/DL (ref 31–36)
MCV RBC AUTO: 87.8 FL (ref 80–100)
MONOCYTES # BLD: 0.7 K/UL (ref 0–1.3)
MONOCYTES NFR BLD: 10.4 %
NEUTROPHILS # BLD: 4.5 K/UL (ref 1.7–7.7)
NEUTROPHILS NFR BLD: 64.3 %
PLATELET # BLD AUTO: 290 K/UL (ref 135–450)
PMV BLD AUTO: 8.6 FL (ref 5–10.5)
POTASSIUM SERPL-SCNC: 4.2 MMOL/L (ref 3.5–5.1)
PROT SERPL-MCNC: 7.2 G/DL (ref 6.4–8.2)
RBC # BLD AUTO: 5.06 M/UL (ref 4–5.2)
SODIUM SERPL-SCNC: 137 MMOL/L (ref 136–145)
TRIGL SERPL-MCNC: 78 MG/DL (ref 0–150)
VLDLC SERPL CALC-MCNC: 16 MG/DL
WBC # BLD AUTO: 7 K/UL (ref 4–11)

## 2025-02-09 NOTE — PROGRESS NOTES
appointments were made and/or referrals ordered.    Positive Risk Factor Screenings with Interventions:               Poor Eating Habits/Diet:  Do you eat balanced/healthy meals regularly?: (!) No  Interventions:  Patient declines any further evaluation or treatment    Abnormal BMI (obese):  Body mass index is 37.41 kg/m². (!) Abnormal  Interventions:  Patient comments: Trying to lose weight         Hearing Screen:  Do you or your family notice any trouble with your hearing that hasn't been managed with hearing aids?: (!) Yes    Interventions:  Referred to Audiology  Referred to ENT    Vision Screen:  Do you have difficulty driving, watching TV, or doing any of your daily activities because of your eyesight?: No  Have you had an eye exam within the past year?: (!) No  Interventions:   Patient comments: Plans to make an appt in the near future    Safety:  Do you have non-slip mats or non-slip surfaces or shower bars or grab bars in your shower or bathtub?: (!) No  Interventions:  Patient comments: surface in shower is not slippery     Advanced Directives:  Do you have a Living Will?: (!) No    Intervention:  has NO advanced directive - information provided                     Objective   Vitals:    02/11/25 0918   BP: 118/70   Pulse: 84   SpO2: 96%   Weight: 89.8 kg (198 lb)   Height: 1.549 m (5' 1\")      Body mass index is 37.41 kg/m².                  Allergies   Allergen Reactions    Aspirin      Recurrent GI blood Loss?    Sulfa Antibiotics      Other reaction(s): Other (See Comments)  hallucinations  hallucinations    Ace Inhibitors Palpitations     Swelling and cough  cough     Prior to Visit Medications    Medication Sig Taking? Authorizing Provider   levothyroxine (SYNTHROID) 100 MCG tablet Take 1 tablet by mouth daily Yes Jb Tuttle Jr., MD   valsartan-hydroCHLOROthiazide (DIOVAN-HCT) 80-12.5 MG per tablet Take 1 tablet by mouth daily Yes Jb Tuttle Jr., MD   Albuterol Sulfate, sensor,

## 2025-02-11 ENCOUNTER — OFFICE VISIT (OUTPATIENT)
Dept: FAMILY MEDICINE CLINIC | Age: 85
End: 2025-02-11

## 2025-02-11 VITALS
OXYGEN SATURATION: 96 % | SYSTOLIC BLOOD PRESSURE: 118 MMHG | BODY MASS INDEX: 37.38 KG/M2 | HEIGHT: 61 IN | HEART RATE: 84 BPM | DIASTOLIC BLOOD PRESSURE: 70 MMHG | WEIGHT: 198 LBS

## 2025-02-11 DIAGNOSIS — H91.91 DECREASED HEARING OF RIGHT EAR: ICD-10-CM

## 2025-02-11 DIAGNOSIS — Z00.00 MEDICARE ANNUAL WELLNESS VISIT, SUBSEQUENT: ICD-10-CM

## 2025-02-11 DIAGNOSIS — I10 ESSENTIAL HYPERTENSION: ICD-10-CM

## 2025-02-11 DIAGNOSIS — J41.8 MIXED SIMPLE AND MUCOPURULENT CHRONIC BRONCHITIS (HCC): ICD-10-CM

## 2025-02-11 DIAGNOSIS — M15.9 GENERALIZED OSTEOARTHROSIS, INVOLVING MULTIPLE SITES: ICD-10-CM

## 2025-02-11 DIAGNOSIS — I49.9 IRREGULAR HEART RATE: ICD-10-CM

## 2025-02-11 DIAGNOSIS — E03.9 ACQUIRED HYPOTHYROIDISM: ICD-10-CM

## 2025-02-11 DIAGNOSIS — E78.00 PURE HYPERCHOLESTEROLEMIA: Primary | ICD-10-CM

## 2025-02-11 DIAGNOSIS — K21.9 GASTROESOPHAGEAL REFLUX DISEASE, UNSPECIFIED WHETHER ESOPHAGITIS PRESENT: ICD-10-CM

## 2025-02-11 SDOH — ECONOMIC STABILITY: FOOD INSECURITY: WITHIN THE PAST 12 MONTHS, YOU WORRIED THAT YOUR FOOD WOULD RUN OUT BEFORE YOU GOT MONEY TO BUY MORE.: NEVER TRUE

## 2025-02-11 SDOH — ECONOMIC STABILITY: FOOD INSECURITY: WITHIN THE PAST 12 MONTHS, THE FOOD YOU BOUGHT JUST DIDN'T LAST AND YOU DIDN'T HAVE MONEY TO GET MORE.: NEVER TRUE

## 2025-02-11 ASSESSMENT — PATIENT HEALTH QUESTIONNAIRE - PHQ9
SUM OF ALL RESPONSES TO PHQ QUESTIONS 1-9: 0
SUM OF ALL RESPONSES TO PHQ9 QUESTIONS 1 & 2: 0
1. LITTLE INTEREST OR PLEASURE IN DOING THINGS: NOT AT ALL
SUM OF ALL RESPONSES TO PHQ QUESTIONS 1-9: 0
2. FEELING DOWN, DEPRESSED OR HOPELESS: NOT AT ALL

## 2025-02-11 ASSESSMENT — ENCOUNTER SYMPTOMS
APNEA: 0
BACK PAIN: 1
RHINORRHEA: 0
SHORTNESS OF BREATH: 0

## 2025-07-16 ENCOUNTER — OFFICE VISIT (OUTPATIENT)
Dept: FAMILY MEDICINE CLINIC | Age: 85
End: 2025-07-16

## 2025-07-16 VITALS
WEIGHT: 192.6 LBS | SYSTOLIC BLOOD PRESSURE: 126 MMHG | OXYGEN SATURATION: 96 % | HEART RATE: 81 BPM | DIASTOLIC BLOOD PRESSURE: 80 MMHG | BODY MASS INDEX: 36.39 KG/M2 | TEMPERATURE: 97.9 F

## 2025-07-16 DIAGNOSIS — R30.0 DYSURIA: ICD-10-CM

## 2025-07-16 DIAGNOSIS — N30.01 ACUTE CYSTITIS WITH HEMATURIA: Primary | ICD-10-CM

## 2025-07-16 LAB
BILIRUBIN, POC: NEGATIVE
BLOOD URINE, POC: NORMAL
CLARITY, POC: CLEAR
COLOR, POC: NORMAL
GLUCOSE URINE, POC: NEGATIVE MG/DL
KETONES, POC: NEGATIVE MG/DL
LEUKOCYTE EST, POC: NORMAL
NITRITE, POC: NEGATIVE
PH, POC: 6.5
PROTEIN, POC: NORMAL MG/DL
SPECIFIC GRAVITY, POC: <=1.005
UROBILINOGEN, POC: 1 MG/DL

## 2025-07-16 RX ORDER — NITROFURANTOIN 25; 75 MG/1; MG/1
100 CAPSULE ORAL 2 TIMES DAILY
Qty: 14 CAPSULE | Refills: 0 | Status: SHIPPED | OUTPATIENT
Start: 2025-07-16 | End: 2025-07-23

## 2025-07-16 NOTE — PROGRESS NOTES
Mojgan Gresham  : 1940  Encounter date: 2025    This jerson 85 y.o. female who presents with  Chief Complaint   Patient presents with    Urinary Tract Infection     Lower abdominal pain, frequent need to urinate, burning. Started         History of present illness:    HPI PT is 85 year old female with concerns for dysuria, increased frequency and lower abdominal pain started 4 days ago.  Pt without significant history for UTI's.  Denies itching or vaginal discharge.  Denies fevers.  Reports last week had some N/V and diarrhea but since resolved.  Has not tried anything so far.  No other concerns at this time.    Current Outpatient Medications on File Prior to Visit   Medication Sig Dispense Refill    levothyroxine (SYNTHROID) 100 MCG tablet Take 1 tablet by mouth daily 90 tablet 3    valsartan-hydroCHLOROthiazide (DIOVAN-HCT) 80-12.5 MG per tablet Take 1 tablet by mouth daily 90 tablet 3    Albuterol Sulfate, sensor, (PROAIR DIGIHALER) 108 (90 Base) MCG/ACT AEPB Inhale 2 puffs into the lungs every 6 hours as needed (SOB) 1 each 5    omeprazole (PRILOSEC) 20 MG delayed release capsule Take 1 capsule by mouth every morning (before breakfast) 90 capsule 3    fluticasone (FLONASE) 50 MCG/ACT nasal spray 2 sprays by Nasal route daily as needed for Allergies (as needed) 1 each 5    atorvastatin (LIPITOR) 40 MG tablet TAKE 1 TABLET BY MOUTH DAILY 90 tablet 3    Cyanocobalamin (B-12 PO) Take by mouth daily      cetirizine (ZYRTEC) 10 MG tablet Take 1 tablet by mouth daily      acetaminophen (TYLENOL) 500 MG tablet Take 2 tablets by mouth daily as needed       No current facility-administered medications on file prior to visit.      Allergies   Allergen Reactions    Aspirin      Recurrent GI blood Loss?    Sulfa Antibiotics      Other reaction(s): Other (See Comments)  hallucinations  hallucinations    Ace Inhibitors Palpitations     Swelling and cough  cough     Past Medical History:   Diagnosis Date

## 2025-07-17 LAB — BACTERIA UR CULT: NORMAL

## 2025-08-05 DIAGNOSIS — E78.00 PURE HYPERCHOLESTEROLEMIA: ICD-10-CM

## 2025-08-05 LAB
ALBUMIN SERPL-MCNC: 4 G/DL (ref 3.4–5)
ALBUMIN/GLOB SERPL: 1.4 {RATIO} (ref 1.1–2.2)
ALP SERPL-CCNC: 101 U/L (ref 40–129)
ALT SERPL-CCNC: 8 U/L (ref 10–40)
ANION GAP SERPL CALCULATED.3IONS-SCNC: 11 MMOL/L (ref 3–16)
AST SERPL-CCNC: 20 U/L (ref 15–37)
BASOPHILS # BLD: 0 K/UL (ref 0–0.2)
BASOPHILS NFR BLD: 0.6 %
BILIRUB SERPL-MCNC: 1 MG/DL (ref 0–1)
BUN SERPL-MCNC: 9 MG/DL (ref 7–20)
CALCIUM SERPL-MCNC: 9.2 MG/DL (ref 8.3–10.6)
CHLORIDE SERPL-SCNC: 103 MMOL/L (ref 99–110)
CHOLEST SERPL-MCNC: 131 MG/DL (ref 0–199)
CO2 SERPL-SCNC: 28 MMOL/L (ref 21–32)
CREAT SERPL-MCNC: 0.7 MG/DL (ref 0.6–1.2)
DEPRECATED RDW RBC AUTO: 14.3 % (ref 12.4–15.4)
EOSINOPHIL # BLD: 0.2 K/UL (ref 0–0.6)
EOSINOPHIL NFR BLD: 2.6 %
GFR SERPLBLD CREATININE-BSD FMLA CKD-EPI: 86 ML/MIN/{1.73_M2}
GLUCOSE P FAST SERPL-MCNC: 108 MG/DL (ref 70–99)
HCT VFR BLD AUTO: 42.5 % (ref 36–48)
HDLC SERPL-MCNC: 44 MG/DL (ref 40–60)
HGB BLD-MCNC: 14.3 G/DL (ref 12–16)
LDL CHOLESTEROL: 72 MG/DL
LYMPHOCYTES # BLD: 1.4 K/UL (ref 1–5.1)
LYMPHOCYTES NFR BLD: 24.2 %
MCH RBC QN AUTO: 29.1 PG (ref 26–34)
MCHC RBC AUTO-ENTMCNC: 33.7 G/DL (ref 31–36)
MCV RBC AUTO: 86.5 FL (ref 80–100)
MONOCYTES # BLD: 0.5 K/UL (ref 0–1.3)
MONOCYTES NFR BLD: 9.3 %
NEUTROPHILS # BLD: 3.7 K/UL (ref 1.7–7.7)
NEUTROPHILS NFR BLD: 63.3 %
PLATELET # BLD AUTO: 271 K/UL (ref 135–450)
PMV BLD AUTO: 8.7 FL (ref 5–10.5)
POTASSIUM SERPL-SCNC: 4.3 MMOL/L (ref 3.5–5.1)
PROT SERPL-MCNC: 6.8 G/DL (ref 6.4–8.2)
RBC # BLD AUTO: 4.91 M/UL (ref 4–5.2)
SODIUM SERPL-SCNC: 142 MMOL/L (ref 136–145)
TRIGL SERPL-MCNC: 73 MG/DL (ref 0–150)
TSH SERPL DL<=0.005 MIU/L-ACNC: 2.04 UIU/ML (ref 0.27–4.2)
VLDLC SERPL CALC-MCNC: 15 MG/DL
WBC # BLD AUTO: 5.9 K/UL (ref 4–11)

## 2025-08-12 ENCOUNTER — OFFICE VISIT (OUTPATIENT)
Dept: FAMILY MEDICINE CLINIC | Age: 85
End: 2025-08-12
Payer: MEDICARE

## 2025-08-12 VITALS
BODY MASS INDEX: 36.47 KG/M2 | TEMPERATURE: 98.1 F | WEIGHT: 193 LBS | SYSTOLIC BLOOD PRESSURE: 124 MMHG | HEART RATE: 85 BPM | OXYGEN SATURATION: 96 % | DIASTOLIC BLOOD PRESSURE: 80 MMHG

## 2025-08-12 DIAGNOSIS — J41.8 MIXED SIMPLE AND MUCOPURULENT CHRONIC BRONCHITIS (HCC): ICD-10-CM

## 2025-08-12 DIAGNOSIS — K21.9 GASTROESOPHAGEAL REFLUX DISEASE, UNSPECIFIED WHETHER ESOPHAGITIS PRESENT: ICD-10-CM

## 2025-08-12 DIAGNOSIS — E78.00 PURE HYPERCHOLESTEROLEMIA: Primary | ICD-10-CM

## 2025-08-12 DIAGNOSIS — I10 ESSENTIAL HYPERTENSION: ICD-10-CM

## 2025-08-12 PROCEDURE — 99214 OFFICE O/P EST MOD 30 MIN: CPT | Performed by: FAMILY MEDICINE

## 2025-08-12 PROCEDURE — 1159F MED LIST DOCD IN RCRD: CPT | Performed by: FAMILY MEDICINE

## 2025-08-12 PROCEDURE — G8417 CALC BMI ABV UP PARAM F/U: HCPCS | Performed by: FAMILY MEDICINE

## 2025-08-12 PROCEDURE — 3023F SPIROM DOC REV: CPT | Performed by: FAMILY MEDICINE

## 2025-08-12 PROCEDURE — 1090F PRES/ABSN URINE INCON ASSESS: CPT | Performed by: FAMILY MEDICINE

## 2025-08-12 PROCEDURE — 1036F TOBACCO NON-USER: CPT | Performed by: FAMILY MEDICINE

## 2025-08-12 PROCEDURE — G8399 PT W/DXA RESULTS DOCUMENT: HCPCS | Performed by: FAMILY MEDICINE

## 2025-08-12 PROCEDURE — 3074F SYST BP LT 130 MM HG: CPT | Performed by: FAMILY MEDICINE

## 2025-08-12 PROCEDURE — G8427 DOCREV CUR MEDS BY ELIG CLIN: HCPCS | Performed by: FAMILY MEDICINE

## 2025-08-12 PROCEDURE — 3079F DIAST BP 80-89 MM HG: CPT | Performed by: FAMILY MEDICINE

## 2025-08-12 PROCEDURE — 1124F ACP DISCUSS-NO DSCNMKR DOCD: CPT | Performed by: FAMILY MEDICINE

## 2025-08-12 ASSESSMENT — ENCOUNTER SYMPTOMS
RHINORRHEA: 0
BACK PAIN: 0
SHORTNESS OF BREATH: 0

## (undated) DEVICE — PROCEDURE KIT ENDOSCP CUST

## (undated) DEVICE — YANKAUER SUCTION INSTRUMENT NO CONTROL VENT, BULB TIP, CLEAR: Brand: YANKAUER

## (undated) DEVICE — MOUTHPIECE ENDOSCP L CTRL OPN AND SIDE PORTS DISP

## (undated) DEVICE — SOLUTION IRRIG 1000ML 0.9% SOD CHL USP POUR PLAS BTL

## (undated) DEVICE — LEGGINGS, PAIR, 31X48, STERILE: Brand: MEDLINE

## (undated) DEVICE — DEVICE SUT SHFT L34CM DIA 10MM 2 JAW LD UNIT ENDOSTCH

## (undated) DEVICE — ADHESIVE SKIN CLSR 0.7ML TOP DERMBND ADV

## (undated) DEVICE — 30977 SEE SHARP - ENHANCED INTRAOPERATIVE LAPAROSCOPE CLEANING & DEFOGGING: Brand: 30977 SEE SHARP - ENHANCED INTRAOPERATIVE LAPAROSCOPE CLEANING & DEFOGGING

## (undated) DEVICE — SUTURE MCRYL + SZ 4-0 L18IN ABSRB UD L19MM PS-2 3/8 CIR MCP496G

## (undated) DEVICE — SOLUTION IV IRRIG WATER 500ML POUR BRL ST 2F7113

## (undated) DEVICE — [HIGH FLOW INSUFFLATOR,  DO NOT USE IF PACKAGE IS DAMAGED,  KEEP DRY,  KEEP AWAY FROM SUNLIGHT,  PROTECT FROM HEAT AND RADIOACTIVE SOURCES.]: Brand: PNEUMOSURE

## (undated) DEVICE — HYPODERMIC SAFETY NEEDLE: Brand: MAGELLAN

## (undated) DEVICE — LAPAROSCOPY PACK: Brand: MEDLINE INDUSTRIES, INC.

## (undated) DEVICE — PMI DISPOSABLE PUNCTURE CLOSURE DEVICE / SUTURE GRASPER: Brand: PMI

## (undated) DEVICE — APPLICATOR MEDICATED 26 CC SOLUTION HI LT ORNG CHLORAPREP

## (undated) DEVICE — BLANKET WRM W29.9XL79.1IN UP BODY FORC AIR MISTRAL-AIR

## (undated) DEVICE — TK® QUICK LOAD® UNIT: Brand: TK® QUICK LOAD®

## (undated) DEVICE — LAPAROSCOPIC TROCAR SLEEVE/SINGLE USE: Brand: KII® LOW PROFILE OPTICAL ACCESS SYSTEM

## (undated) DEVICE — SET VLV 3 PC AWS DISPOSABLE GRDIAN SCOPEVALET

## (undated) DEVICE — PLUMEPORT LAPAROSCOPIC SMOKE FILTRATION DEVICE: Brand: PLUMEPORT ACTIV

## (undated) DEVICE — SUTURE VCRL + SZ 0 L27IN ABSRB VLT L26MM UR-6 5/8 CIR VCP603H

## (undated) DEVICE — DEVICE SUT 0 L48IN GRN POLY BRAID LD UNIT DISP SURGDAC

## (undated) DEVICE — Device

## (undated) DEVICE — 3M™ IOBAN™ 2 ANTIMICROBIAL INCISE DRAPE 6650EZ: Brand: IOBAN™ 2

## (undated) DEVICE — NDLCTR: FOAM/MAG 40CT 64/CS: Brand: MEDICAL ACTION INDUSTRIES

## (undated) DEVICE — GOWN SIRUS NONREIN LG W/TWL: Brand: MEDLINE INDUSTRIES, INC.

## (undated) DEVICE — TROCAR SLEEVE: Brand: KII ® LOW PROFILE SLEEVE

## (undated) DEVICE — MEDI-VAC NON-CONDUCTIVE SUCTION TUBING: Brand: CARDINAL HEALTH

## (undated) DEVICE — COVER LT HNDL BLU PLAS

## (undated) DEVICE — SHEARS LAP L45CM DIA5MM ULTRASONIC CRV TIP ADV HEMSTAS HARM

## (undated) DEVICE — TROCAR: Brand: KII FIOS FIRST ENTRY

## (undated) DEVICE — BW-412T DISP COMBO CLEANING BRUSH: Brand: SINGLE USE COMBINATION CLEANING BRUSH

## (undated) DEVICE — MERCY FAIRFIELD TURNOVER KIT: Brand: MEDLINE INDUSTRIES, INC.

## (undated) DEVICE — GOWN AURORA NONREINF LG: Brand: MEDLINE INDUSTRIES, INC.

## (undated) DEVICE — SYRINGE MED 10ML TRNSLUC BRL PLUNG BLK MRK POLYPR CTRL

## (undated) DEVICE — TK® TI-KNOT® DEVICE: Brand: TK® TI-KNOT®

## (undated) DEVICE — GLOVE SURG SZ 6.5 L11.2IN FNGR THK9.8MIL STRW LTX POLYMER

## (undated) DEVICE — DRAPE,LAP,CHOLE,W/TROUGHS,STERILE: Brand: MEDLINE

## (undated) DEVICE — PAD TABLE RAMPED 1 IN TO 2 IN TRENDELENBURG